# Patient Record
Sex: MALE | Race: WHITE | NOT HISPANIC OR LATINO | Employment: OTHER | URBAN - METROPOLITAN AREA
[De-identification: names, ages, dates, MRNs, and addresses within clinical notes are randomized per-mention and may not be internally consistent; named-entity substitution may affect disease eponyms.]

---

## 2017-01-03 ENCOUNTER — OFFICE VISIT (OUTPATIENT)
Dept: CARDIOLOGY | Facility: CLINIC | Age: 67
End: 2017-01-03
Payer: MEDICARE

## 2017-01-03 VITALS
HEIGHT: 71 IN | SYSTOLIC BLOOD PRESSURE: 163 MMHG | HEART RATE: 55 BPM | BODY MASS INDEX: 25.96 KG/M2 | DIASTOLIC BLOOD PRESSURE: 87 MMHG | WEIGHT: 185.44 LBS | OXYGEN SATURATION: 97 % | RESPIRATION RATE: 15 BRPM

## 2017-01-03 DIAGNOSIS — I71.40 ABDOMINAL AORTIC ANEURYSM (AAA) WITHOUT RUPTURE: ICD-10-CM

## 2017-01-03 DIAGNOSIS — I73.9 PAD (PERIPHERAL ARTERY DISEASE): ICD-10-CM

## 2017-01-03 DIAGNOSIS — I10 HYPERTENSION, ESSENTIAL: ICD-10-CM

## 2017-01-03 DIAGNOSIS — F17.200 CURRENT EVERY DAY SMOKER: Chronic | ICD-10-CM

## 2017-01-03 DIAGNOSIS — I25.10 CORONARY ARTERY DISEASE INVOLVING NATIVE CORONARY ARTERY OF NATIVE HEART WITHOUT ANGINA PECTORIS: Primary | ICD-10-CM

## 2017-01-03 PROCEDURE — 99214 OFFICE O/P EST MOD 30 MIN: CPT | Mod: S$GLB,,, | Performed by: INTERNAL MEDICINE

## 2017-01-03 PROCEDURE — 3077F SYST BP >= 140 MM HG: CPT | Mod: S$GLB,,, | Performed by: INTERNAL MEDICINE

## 2017-01-03 PROCEDURE — 3079F DIAST BP 80-89 MM HG: CPT | Mod: S$GLB,,, | Performed by: INTERNAL MEDICINE

## 2017-01-03 PROCEDURE — 1159F MED LIST DOCD IN RCRD: CPT | Mod: S$GLB,,, | Performed by: INTERNAL MEDICINE

## 2017-01-03 PROCEDURE — 99999 PR PBB SHADOW E&M-EST. PATIENT-LVL III: CPT | Mod: PBBFAC,,, | Performed by: INTERNAL MEDICINE

## 2017-01-03 RX ORDER — ASPIRIN 81 MG/1
81 TABLET ORAL DAILY
Qty: 90 TABLET | Refills: 3 | COMMUNITY
Start: 2017-01-03

## 2017-01-03 NOTE — MR AVS SNAPSHOT
"    [facility]  [address] [place] [address]  Phone: [phone]                  Antolin Mcmillan   1/3/2017 9:20 AM   Office Visit    Description:  Male : 1950   Provider:  Gustavo Michael MD   Department:  [facility]           Reason for Visit     Hospital Follow Up     Shortness of Breath                To Do List           Future Appointments        Provider Department Dept Phone    1/3/2017 9:20 AM Gustavo Michael MD [facility] [place], [place] [phone]      Goals (5 Years of Data)     None      [facility]     [facility] Care [facility] -  Assistance  Registered nurses in the [facility] provide clinical advisement, health education, appointment booking, and other advisory services.  Call for this free service at [phone].             Medications                Verify that the below list of medications is an accurate representation of the medications you are currently taking.  If none reported, the list may be blank. If incorrect, please contact your healthcare provider. Carry this list with you in case of emergency.           Current Medications     amlodipine (NORVASC) 10 MG tablet Take 10 mg by mouth once daily.    lisinopril-hydrochlorothiazide (PRINZIDE,ZESTORETIC) 20-25 mg Tab Take 1 tablet by mouth once daily.    metoprolol succinate (TOPROL-XL) 50 MG 24 hr tablet Take 50 mg by mouth once daily.    chlorzoxazone (PARAFON FORTE) 500 mg Tab Take 500 mg by mouth 4 (four) times daily as needed.           Clinical Reference Information           Vital Signs - Last Recorded  Most recent update: 1/3/2017  8:44 AM by Rosalinda Dumont MA    BP Pulse Resp Ht Wt SpO2    (!) 163/87 (!) 55 15 5' 11" (1.803 m) 84.1 kg (185 lb 6.5 oz) 97%    BMI                25.86 kg/m2          Blood Pressure          Most Recent Value    BP  (!)  163/87      Allergies as of 1/3/2017     No Known Allergies      Immunizations Administered on Date " of Encounter - 1/3/2017     None      [facility] Sign-Up     Activating your [facility] account is as easy as 1-2-3!     1) Visit [email or web address], select Sign Up Now, enter this activation code and your date of birth, then select Next.  [email or web address]  Expires: 2/4/2017  2:36 PM      2) Create a username and password to use when you visit [facility] in the future and select a security question in case you lose your password and select Next.    3) Enter your e-mail address and click Sign Up!    Additional Information  If you have questions, please e-mail [email or web address] or call [phone] to talk to our [facility] staff. Remember, [facility] is NOT to be used for urgent needs. For medical emergencies, dial 911.         Smoking Cessation     If you would like to quit smoking:   You may be eligible for free services if you are a [place] resident and started smoking cigarettes before September 1, 1988.  Call the Smoking Cessation Trust (SCT) toll free at ([phone] 225-6991 or ([phone].   Call 4-024-QUIT-NOW if you do not meet the above criteria.

## 2017-01-03 NOTE — PROGRESS NOTES
CARDIOVASCULAR PROGRESS NOTE    REASON FOR CONSULT:   Antolin Mcmillan is a 66 y.o. male who presents for follow up of CAD, HTN, AAA repair.    PCP: Jimmie  HISTORY OF PRESENT ILLNESS:   The patient presents for follow-up of his recent hospitalization for elective endovascular AAA repair.  This was performed successfully by Willy Chahal and Gerda.  He otherwise denies intercurrent angina or dyspnea.  He's had no palpitations, lightheadedness, dizziness, or syncope.  There's been no PND, orthopnea, or lower extremity edema.  He denies melena, hematuria, or claudicant symptoms.  He does describe some shoulder pain radiating to his left hand, as well as a burning sensation in both of his feet.  The pedal symptoms predated his AAA repair.  He does not describe any exertional symptoms such as calf pain.    The patient tells me he takes a baby aspirin daily.  Unfortunately, he cannot confirm the other parts of his medical regimen, including upon calling his wife at home.  I described the importance to him of knowing exactly what medication and dosage he is taking, and that he should keep a updated list with him at all times.    CARDIOVASCULAR HISTORY:   AAA s/p endo repair 12/2016 (Fela/Gerda)  CAD hx PCI 2009    PAST MEDICAL HISTORY:     Past Medical History   Diagnosis Date    AAA (abdominal aortic aneurysm) 12/2016     s/p endo repair (Fela)    Acid reflux     Anxiety     Coronary artery disease     Hypertension        PAST SURGICAL HISTORY:     Past Surgical History   Procedure Laterality Date    Appendectomy      Coronary angioplasty  2009       ALLERGIES AND MEDICATION:   Review of patient's allergies indicates:  No Known Allergies  Previous Medications    AMLODIPINE (NORVASC) 10 MG TABLET    Take 10 mg by mouth once daily.    CHLORZOXAZONE (PARAFON FORTE) 500 MG TAB    Take 500 mg by mouth 4 (four) times daily as needed.    LISINOPRIL-HYDROCHLOROTHIAZIDE (PRINZIDE,ZESTORETIC) 20-25 MG  "TAB    Take 1 tablet by mouth once daily.    METOPROLOL SUCCINATE (TOPROL-XL) 50 MG 24 HR TABLET    Take 50 mg by mouth once daily.       SOCIAL HISTORY:     Social History     Social History    Marital status:      Spouse name: N/A    Number of children: N/A    Years of education: N/A     Occupational History    Not on file.     Social History Main Topics    Smoking status: Smoker, Current Status Unknown     Packs/day: 2.00     Years: 20.00    Smokeless tobacco: Not on file    Alcohol use No    Drug use: No    Sexual activity: Not on file     Other Topics Concern    Not on file     Social History Narrative       FAMILY HISTORY:   No family history on file.    REVIEW OF SYSTEMS:   Review of Systems   Constitutional: Negative for chills, diaphoresis and fever.   HENT: Negative for nosebleeds.    Eyes: Negative for blurred vision, double vision and photophobia.   Respiratory: Negative for hemoptysis, shortness of breath and wheezing.    Cardiovascular: Negative for chest pain, palpitations, orthopnea, claudication, leg swelling and PND.   Gastrointestinal: Negative for abdominal pain, blood in stool, heartburn, melena, nausea and vomiting.   Genitourinary: Negative for flank pain and hematuria.   Musculoskeletal: Positive for joint pain. Negative for falls, myalgias and neck pain.   Skin: Negative for rash.   Neurological: Positive for tingling. Negative for dizziness, seizures, loss of consciousness, weakness and headaches.   Endo/Heme/Allergies: Negative for polydipsia. Does not bruise/bleed easily.   Psychiatric/Behavioral: Negative for depression and memory loss. The patient is not nervous/anxious.        PHYSICAL EXAM:     Vitals:    01/03/17 0837   BP: (!) 163/87   Pulse: (!) 55   Resp: 15    Body mass index is 25.86 kg/(m^2).  Weight: 84.1 kg (185 lb 6.5 oz)   Height: 5' 11" (180.3 cm)     Physical Exam   Constitutional: He is oriented to person, place, and time. He appears well-developed and " well-nourished. He is cooperative.  Non-toxic appearance. No distress.   HENT:   Head: Normocephalic and atraumatic.   Eyes: Conjunctivae and EOM are normal. Pupils are equal, round, and reactive to light. No scleral icterus.   Neck: Trachea normal and normal range of motion. Neck supple. Normal carotid pulses and no JVD present. Carotid bruit is not present. No rigidity. No edema present. No thyromegaly present.   Cardiovascular: Normal rate, regular rhythm, S1 normal and S2 normal.  PMI is not displaced.  Exam reveals no gallop and no friction rub.    No murmur heard.  Pulses:       Carotid pulses are 2+ on the right side, and 2+ on the left side.       Dorsalis pedis pulses are 1+ on the left side.        Posterior tibial pulses are 2+ on the right side, and 1+ on the left side.   Pulmonary/Chest: Effort normal and breath sounds normal. No respiratory distress. He has no wheezes. He has no rales. He exhibits no tenderness.   Abdominal: Soft. Bowel sounds are normal. He exhibits no distension and no mass. There is no hepatosplenomegaly. There is no tenderness.   Musculoskeletal: He exhibits no edema or tenderness.   Feet:   Right Foot:   Skin Integrity: Negative for ulcer.   Left Foot:   Skin Integrity: Negative for ulcer.   Neurological: He is alert and oriented to person, place, and time. No cranial nerve deficit.   Skin: Skin is warm and dry. No rash noted. No erythema.   Psychiatric: He has a normal mood and affect. His speech is normal and behavior is normal.   Vitals reviewed.      DATA:   EKG: (personally reviewed tracing)  12/8/16 SB 44, o/w normal    Laboratory:  CBC:    Recent Labs  Lab 03/11/14  0605 12/08/16  1319 12/13/16  0350   WHITE BLOOD CELL COUNT 8.33 10.28 8.16   HEMOGLOBIN 15.6 15.9 12.8 L   HEMATOCRIT 45.3 46.8 38.5 L   PLATELETS 256 264 149 L       CHEMISTRIES:    Recent Labs  Lab 03/10/14  1915  12/08/16  1319 12/13/16  0349 12/14/16  0407   GLUCOSE 82  < > 80 100 104   SODIUM 139  < > 140  140 137   POTASSIUM 3.4 L  < > 4.5 3.4 L 3.8   BUN BLD 14  < > 15 12 11   CREATININE 1.1  < > 1.1 1.1 1.0   EGFR IF  >60  < > >60 >60 >60   EGFR IF NON- >60  < > >60 >60 >60   CALCIUM 10.1  < > 9.7 8.0 L 8.9   MAGNESIUM 1.7  --   --   --  2.0   < > = values in this interval not displayed.    CARDIAC BIOMARKERS:    Recent Labs  Lab 03/10/14  1915 03/11/14  0605 03/11/14  1152   TROPONIN I 0.008 <0.006 <0.006       COAGS:    Recent Labs  Lab 03/10/14  1915   INR 1.0       LIPIDS/LFTS:    Recent Labs  Lab 03/10/14  1915 12/13/16  0349   CHOLESTEROL  --  171   TRIGLYCERIDES  --  219 H   HDL  --  23 L   LDL CHOLESTEROL  --  104.2   NON-HDL CHOLESTEROL  --  148   AST 19 22   ALT 19 28       Cardiovascular Testing:  Stress Test: L MPI 3/11/14  Nuclear Quantitative Functional Analysis:   LVEF: >= 70 %  Impression: NORMAL MYOCARDIAL PERFUSION  1. The perfusion scan is free of evidence for myocardial ischemia or injury.   2. There is a moderate intensity fixed defect in the inferior wall of the left ventricle, secondary to diaphragm attenuation.   3. Resting wall motion is physiologic.   4. Resting LV function is normal.   5. The ventricular volumes are normal at rest and stress.   6. The extracardiac distribution of radioactivity is normal.     ASSESSMENT:   # AAA s/p endovasc repair 12/2016 (Fela/Gerda)  # HTN, uncontrolled  # CAD, hx of distant PCI, last MPI 3/2014  # elev TG/LDL  # atyp claudicant sxs    PLAN:   Consider statin rx, will d/w pt when he brings pill bottles to next OV  Ex MPI  Smoking class  LE art US/LASHAUN  Pt to bring pill bottles to next OV  Follow up planned with Dr. Chahal re: AAA repair  RTC 1 month      Gustavo Michael MD, FACC

## 2017-01-09 ENCOUNTER — HOSPITAL ENCOUNTER (OUTPATIENT)
Dept: RADIOLOGY | Facility: HOSPITAL | Age: 67
Discharge: HOME | End: 2017-01-09
Attending: INTERNAL MEDICINE
Payer: MEDICARE

## 2017-01-09 DIAGNOSIS — I73.9 PAD (PERIPHERAL ARTERY DISEASE): ICD-10-CM

## 2017-01-09 PROCEDURE — 93925 LOWER EXTREMITY STUDY: CPT | Mod: 26,,, | Performed by: RADIOLOGY

## 2017-01-09 PROCEDURE — 93925 LOWER EXTREMITY STUDY: CPT | Mod: TC

## 2017-01-09 PROCEDURE — 93922 UPR/L XTREMITY ART 2 LEVELS: CPT | Mod: 26,,, | Performed by: RADIOLOGY

## 2017-01-10 ENCOUNTER — HOSPITAL ENCOUNTER (OUTPATIENT)
Dept: CARDIOLOGY | Facility: HOSPITAL | Age: 67
Discharge: HOME | End: 2017-01-10
Attending: INTERNAL MEDICINE
Payer: MEDICARE

## 2017-01-10 ENCOUNTER — HOSPITAL ENCOUNTER (OUTPATIENT)
Dept: RADIOLOGY | Facility: HOSPITAL | Age: 67
Discharge: HOME | End: 2017-01-10
Attending: INTERNAL MEDICINE
Payer: MEDICARE

## 2017-01-10 DIAGNOSIS — I25.10 CORONARY ARTERY DISEASE INVOLVING NATIVE CORONARY ARTERY OF NATIVE HEART WITHOUT ANGINA PECTORIS: ICD-10-CM

## 2017-01-10 LAB — DIASTOLIC DYSFUNCTION: NO

## 2017-01-10 PROCEDURE — 78452 HT MUSCLE IMAGE SPECT MULT: CPT | Mod: 26,,, | Performed by: INTERNAL MEDICINE

## 2017-01-10 PROCEDURE — 93016 CV STRESS TEST SUPVJ ONLY: CPT | Mod: ,,, | Performed by: INTERNAL MEDICINE

## 2017-01-10 PROCEDURE — 93017 CV STRESS TEST TRACING ONLY: CPT

## 2017-01-10 PROCEDURE — 93018 CV STRESS TEST I&R ONLY: CPT | Mod: ,,, | Performed by: INTERNAL MEDICINE

## 2017-01-10 PROCEDURE — 78452 HT MUSCLE IMAGE SPECT MULT: CPT | Mod: TC

## 2017-01-12 ENCOUNTER — CLINICAL SUPPORT (OUTPATIENT)
Dept: SMOKING CESSATION | Facility: CLINIC | Age: 67
End: 2017-01-12
Payer: COMMERCIAL

## 2017-01-12 DIAGNOSIS — F17.200 NICOTINE DEPENDENCE: Primary | ICD-10-CM

## 2017-01-12 PROCEDURE — 99999 PR PBB SHADOW E&M-EST. PATIENT-LVL I: CPT | Mod: PBBFAC,,,

## 2017-01-12 PROCEDURE — 99404 PREV MED CNSL INDIV APPRX 60: CPT | Mod: S$GLB,,,

## 2017-01-12 RX ORDER — IBUPROFEN 200 MG
1 TABLET ORAL DAILY
Qty: 14 PATCH | Refills: 0 | Status: SHIPPED | OUTPATIENT
Start: 2017-01-12 | End: 2017-01-27 | Stop reason: SDUPTHER

## 2017-01-12 NOTE — Clinical Note
Pt seen at intake today. He currently smokes 20-25 cigs/day. Discussed tobacco cessation medications of chantix starter pack and 21 mg nicotine patch QD . Pt started on rate reduction and wait time of 15 min prior to smoking. Will see pt back in office group in 1 wk. Pt's exhaled carbon monoxide level was 15 ppm as per [facility]..  Please see Tobacco Cessation Intake Form for patient assessment and recommendation

## 2017-01-19 ENCOUNTER — CLINICAL SUPPORT (OUTPATIENT)
Dept: SMOKING CESSATION | Facility: CLINIC | Age: 67
End: 2017-01-19
Payer: COMMERCIAL

## 2017-01-19 DIAGNOSIS — F17.200 NICOTINE DEPENDENCE: Primary | ICD-10-CM

## 2017-01-19 PROCEDURE — 99402 PREV MED CNSL INDIV APPRX 30: CPT | Mod: S$GLB,,,

## 2017-01-19 RX ORDER — MICONAZOLE NITRATE 2 %
2 CREAM (GRAM) TOPICAL
Qty: 160 EACH | Refills: 0 | Status: SHIPPED | OUTPATIENT
Start: 2017-01-19 | End: 2017-11-17

## 2017-01-22 NOTE — PROGRESS NOTES
Pt seen in office today.       He continues to smoke  25 cigs/day.    Pt remains on tobacco cessation medications of    21 mg nicotine patch QD    Patient said he still craved , so 2 mg nicotine lozenge   PRN (1-2 per hour in place of cigarettes) was added.                  No adverse effects  or mental changes noted at this time.   Reviewed coping strategies/habitual behavior/relapse prevention with patient.  Stressed to patient that he would really benefit from group and he said he will definitely make it next time.   Talked / reassured pt.    Exhaled carbon monoxide level was 13 per [facility].   Will see pt back in group  in 1 week .

## 2017-01-25 ENCOUNTER — OFFICE VISIT (OUTPATIENT)
Dept: CARDIOLOGY | Facility: CLINIC | Age: 67
End: 2017-01-25
Payer: MEDICARE

## 2017-01-25 ENCOUNTER — CLINICAL SUPPORT (OUTPATIENT)
Dept: SMOKING CESSATION | Facility: CLINIC | Age: 67
End: 2017-01-25
Payer: COMMERCIAL

## 2017-01-25 VITALS
HEIGHT: 71 IN | BODY MASS INDEX: 26.08 KG/M2 | RESPIRATION RATE: 15 BRPM | SYSTOLIC BLOOD PRESSURE: 165 MMHG | WEIGHT: 186.31 LBS | OXYGEN SATURATION: 95 % | DIASTOLIC BLOOD PRESSURE: 99 MMHG | HEART RATE: 68 BPM

## 2017-01-25 DIAGNOSIS — F17.200 NICOTINE DEPENDENCE: Primary | ICD-10-CM

## 2017-01-25 DIAGNOSIS — I25.10 CORONARY ARTERY DISEASE INVOLVING NATIVE CORONARY ARTERY OF NATIVE HEART WITHOUT ANGINA PECTORIS: Primary | ICD-10-CM

## 2017-01-25 DIAGNOSIS — I10 HYPERTENSION, ESSENTIAL: ICD-10-CM

## 2017-01-25 DIAGNOSIS — F17.200 CURRENT EVERY DAY SMOKER: Chronic | ICD-10-CM

## 2017-01-25 DIAGNOSIS — I71.40 ABDOMINAL AORTIC ANEURYSM (AAA) WITHOUT RUPTURE: ICD-10-CM

## 2017-01-25 PROCEDURE — 90853 GROUP PSYCHOTHERAPY: CPT | Mod: S$GLB,,,

## 2017-01-25 PROCEDURE — 99214 OFFICE O/P EST MOD 30 MIN: CPT | Mod: S$GLB,,, | Performed by: INTERNAL MEDICINE

## 2017-01-25 PROCEDURE — 3077F SYST BP >= 140 MM HG: CPT | Mod: S$GLB,,, | Performed by: INTERNAL MEDICINE

## 2017-01-25 PROCEDURE — 99999 PR PBB SHADOW E&M-EST. PATIENT-LVL I: CPT | Mod: PBBFAC,,,

## 2017-01-25 PROCEDURE — 3080F DIAST BP >= 90 MM HG: CPT | Mod: S$GLB,,, | Performed by: INTERNAL MEDICINE

## 2017-01-25 PROCEDURE — 1159F MED LIST DOCD IN RCRD: CPT | Mod: S$GLB,,, | Performed by: INTERNAL MEDICINE

## 2017-01-25 PROCEDURE — 99999 PR PBB SHADOW E&M-EST. PATIENT-LVL III: CPT | Mod: PBBFAC,,, | Performed by: INTERNAL MEDICINE

## 2017-01-25 RX ORDER — AMITRIPTYLINE HYDROCHLORIDE 50 MG/1
50 TABLET, FILM COATED ORAL NIGHTLY
COMMUNITY
End: 2017-11-17

## 2017-01-25 RX ORDER — ATORVASTATIN CALCIUM 40 MG/1
40 TABLET, FILM COATED ORAL NIGHTLY
Qty: 90 TABLET | Refills: 3 | Status: SHIPPED | OUTPATIENT
Start: 2017-01-25 | End: 2018-04-26 | Stop reason: SDUPTHER

## 2017-01-25 RX ORDER — AMLODIPINE BESYLATE 10 MG/1
10 TABLET ORAL DAILY
Qty: 90 TABLET | Refills: 3 | Status: SHIPPED | OUTPATIENT
Start: 2017-01-25 | End: 2018-08-09 | Stop reason: SDUPTHER

## 2017-01-25 NOTE — Clinical Note
Pt continues to smoke 10 cigs/day. Pt remains on tobacco cessation medication of 21 mg nicotine patch QD and 2 mg nicotine lozenge PRN (1-2 per hour in place of cigarettes) for break through tobacco urges/cravings. Discussed symptoms rated as 3 or 4 on TCRS scale, none reported at this time.  Pt asked to reduce smoking rate by 1 cigs/day.  Pt's exhaled carbon monoxide level was 12 ppm per smoker[facility]zer. Will see pt back in group 1 week.

## 2017-01-25 NOTE — MR AVS SNAPSHOT
[facility]  [address] [place] [address]  Phone: [phone]                  Antolin Mcmillan   2017 3:40 PM   Office Visit    Description:  Male : 1950   Provider:  Gustavo Michael MD   Department:  [facility]           Reason for Visit     Coronary Artery Disease     Results                To Do List           Future Appointments        Provider Department Dept Phone    3/2/2017 3:40 PM Gustavo Michael MD [facility] [place] [phone]      Goals (5 Years of Data)     None       These Medications        Disp Refills Start End    amlodipine (NORVASC) 10 MG tablet 90 tablet 3 2017     Take 1 tablet (10 mg total) by mouth once daily. - Oral    Pharmacy: [facility] mail order [address] [place], [place] [address] [facility] mail services Ph #: [phone]       atorvastatin (LIPITOR) 40 MG tablet 90 tablet 3 2017     Take 1 tablet (40 mg total) by mouth every evening. - Oral    Pharmacy: [email or web address] mail [id number] - [place], [place] - [address] [facility] mail services Ph #: [phone]         [facility]     [facility] On Call Nurse Care Line -  Assistance  Registered nurses in the [facility] provide clinical advisement, health education, appointment booking, and other advisory services.  Call for this free service at [phone].             Medications           START taking these NEW medications        Refills    atorvastatin (LIPITOR) 40 MG tablet 3    Sig: Take 1 tablet (40 mg total) by mouth every evening.    Class: Normal    Route: Oral      CHANGE how you are taking these medications     Start Taking Instead of    amlodipine (NORVASC) 10 MG tablet amlodipine (NORVASC) 10 MG tablet    Dosage:  Take 1 tablet (10 mg total) by mouth once daily. Dosage:  Take 10 mg by mouth once daily.    Reason for Change:  Patient no longer taking       STOP taking these medications      "lisinopril-hydrochlorothiazide (PRINZIDE,ZESTORETIC) 20-25 mg Tab Take 1 tablet by mouth once daily.           Verify that the below list of medications is an accurate representation of the medications you are currently taking.  If none reported, the list may be blank. If incorrect, please contact your healthcare provider. Carry this list with you in case of emergency.           Current Medications     amitriptyline (ELAVIL) 50 MG tablet Take 50 mg by mouth every evening.    aspirin (ECOTRIN) 81 MG EC tablet Take 1 tablet (81 mg total) by mouth once daily.    chlorzoxazone (PARAFON FORTE) 500 mg Tab Take 500 mg by mouth 4 (four) times daily as needed.    metoprolol succinate (TOPROL-XL) 50 MG 24 hr tablet Take 50 mg by mouth once daily.    nicotine (NICODERM CQ) 21 mg/24 hr Place 1 patch onto the skin once daily.    nicotine, polacrilex, (NICORETTE) 2 mg Gum Take 1 each (2 mg total) by mouth as needed.    amlodipine (NORVASC) 10 MG tablet Take 1 tablet (10 mg total) by mouth once daily.    atorvastatin (LIPITOR) 40 MG tablet Take 1 tablet (40 mg total) by mouth every evening.           Clinical Reference Information           Vital Signs - Last Recorded  Most recent update: 1/25/2017  3:38 PM by Rosalinda Dumont MA    BP Pulse Resp Ht Wt SpO2    (!) 165/99 68 15 5' 11" (1.803 m) 84.5 kg (186 lb 4.6 oz) 95%    BMI                25.98 kg/m2          Blood Pressure          Most Recent Value    BP  (!)  165/99      Allergies as of 1/25/2017     No Known Allergies      Immunizations Administered on Date of Encounter - 1/25/2017     None      [facility] Sign-Up     Activating your [facility] account is as easy as 1-2-3!     1) Visit [email or web address], select Sign Up Now, enter this activation code and your date of birth, then select Next.  [email or web address]  Expires: 2/4/2017  2:36 PM      2) Create a username and password to use when you visit [facility] in the future and select a security question in case you lose your " password and select Next.    3) Enter your e-mail address and click Sign Up!    Additional Information  If you have questions, please e-mail [email or web address] or call [phone] to talk to our [facility] staff. Remember, [facility] is NOT to be used for urgent needs. For medical emergencies, dial 911.         Smoking Cessation     If you would like to quit smoking:   You may be eligible for free services if you are a [place] resident and started smoking cigarettes before September 1, 1988.  Call the Smoking Cessation Trust (SCT) toll free at (896) 326-4287 or (655) [phone].   Call 1-318-QUIT-NOW if you do not meet the above criteria.

## 2017-01-25 NOTE — PROGRESS NOTES
CARDIOVASCULAR PROGRESS NOTE    REASON FOR CONSULT:   Antolin Mcmillan is a 66 y.o. male who presents for follow up of CAD, HTN, AAA repair.    PCP: Jimmie  HISTORY OF PRESENT ILLNESS:   The patient presents for follow-up.  He brings with him his pill bottles and it appears that he is only taking a baby aspirin along with metoprolol and amitriptyline.  His blood pressure remains elevated.  He otherwise denies intercurrent angina or dyspnea.  He's had no palpitations, lightheadedness, dizziness, or syncope.  There's been no PND, orthopnea, or lower extremity edema.  He denies melena, hematuria, or claudicant symptoms.    I reviewed the results of his lower extremity arterial duplex and nuclear stress test for both essentially normal.    At this point, given his evidence of vascular disease, and like to get him on that her blood pressure medication, as well as drive his lipids down.    CARDIOVASCULAR HISTORY:   AAA s/p endo repair 12/2016 ([facility])  CAD hx PCI 2009    PAST MEDICAL HISTORY:     Past Medical History   Diagnosis Date    AAA (abdominal aortic aneurysm) 12/2016     s/p endo repair ([facility])    Acid reflux     Anxiety     Coronary artery disease     Hypertension        PAST SURGICAL HISTORY:     Past Surgical History   Procedure Laterality Date    Appendectomy      Coronary angioplasty  2009       ALLERGIES AND MEDICATION:   Review of patient's allergies indicates:  No Known Allergies  Previous Medications    AMITRIPTYLINE (ELAVIL) 50 MG TABLET    Take 50 mg by mouth every evening.    ASPIRIN (ECOTRIN) 81 MG EC TABLET    Take 1 tablet (81 mg total) by mouth once daily.    CHLORZOXAZONE (PARAFON FORTE) 500 MG TAB    Take 500 mg by mouth 4 (four) times daily as needed.    METOPROLOL SUCCINATE (TOPROL-XL) 50 MG 24 HR TABLET    Take 50 mg by mouth once daily.    NICOTINE (NICODERM CQ) 21 MG/24 HR    Place 1 patch onto the skin once daily.    NICOTINE, POLACRILEX, (NICORETTE) 2 MG GUM     "Take 1 each (2 mg total) by mouth as needed.       SOCIAL HISTORY:     Social History     Social History    Marital status:      Spouse name: N/A    Number of children: N/A    Years of education: N/A     Occupational History    Not on file.     Social History Main Topics    Smoking status: Smoker, Current Status Unknown     Packs/day: 2.00     Years: 20.00    Smokeless tobacco: Not on file    Alcohol use No    Drug use: No    Sexual activity: Not on file     Other Topics Concern    Not on file     Social History Narrative       FAMILY HISTORY:   No family history on file.    REVIEW OF SYSTEMS:   Review of Systems   Constitutional: Negative for chills, diaphoresis and fever.   HENT: Negative for nosebleeds.    Eyes: Negative for blurred vision, double vision and photophobia.   Respiratory: Negative for hemoptysis, shortness of breath and wheezing.    Cardiovascular: Negative for chest pain, palpitations, orthopnea, claudication, leg swelling and PND.   Gastrointestinal: Negative for abdominal pain, blood in stool, heartburn, melena, nausea and vomiting.   Genitourinary: Negative for flank pain and hematuria.   Musculoskeletal: Positive for joint pain. Negative for falls, myalgias and neck pain.   Skin: Negative for rash.   Neurological: Positive for tingling. Negative for dizziness, seizures, loss of consciousness, weakness and headaches.   Endo/Heme/Allergies: Negative for polydipsia. Does not bruise/bleed easily.   Psychiatric/Behavioral: Negative for depression and memory loss. The patient is not nervous/anxious.        PHYSICAL EXAM:     Vitals:    01/25/17 1535   BP: (!) 165/99   Pulse: 68   Resp: 15    Body mass index is 25.98 kg/(m^2).  Weight: 84.5 kg (186 lb 4.6 oz)   Height: 5' 11" (180.3 cm)     Physical Exam   Constitutional: He is oriented to person, place, and time. He appears well-developed and well-nourished. He is cooperative.  Non-toxic appearance. No distress.   HENT:   Head: " Normocephalic and atraumatic.   Eyes: Conjunctivae and EOM are normal. Pupils are equal, round, and reactive to light. No scleral icterus.   Neck: Trachea normal and normal range of motion. Neck supple. Normal carotid pulses and no JVD present. Carotid bruit is not present. No rigidity. No edema present. No thyromegaly present.   Cardiovascular: Normal rate, regular rhythm, S1 normal and S2 normal.  PMI is not displaced.  Exam reveals no gallop and no friction rub.    No murmur heard.  Pulses:       Carotid pulses are 2+ on the right side, and 2+ on the left side.       Dorsalis pedis pulses are 1+ on the left side.        Posterior tibial pulses are 2+ on the right side, and 1+ on the left side.   Pulmonary/Chest: Effort normal and breath sounds normal. No respiratory distress. He has no wheezes. He has no rales. He exhibits no tenderness.   Abdominal: Soft. Bowel sounds are normal. He exhibits no distension and no mass. There is no hepatosplenomegaly. There is no tenderness.   Musculoskeletal: He exhibits no edema or tenderness.   Feet:   Right Foot:   Skin Integrity: Negative for ulcer.   Left Foot:   Skin Integrity: Negative for ulcer.   Neurological: He is alert and oriented to person, place, and time. No cranial nerve deficit.   Skin: Skin is warm and dry. No rash noted. No erythema.   Psychiatric: He has a normal mood and affect. His speech is normal and behavior is normal.   Vitals reviewed.      DATA:   EKG: (personally reviewed tracing)  12/8/16 SB 44, o/w normal    Laboratory:  CBC:    Recent Labs  Lab 03/11/14  0605 12/08/16  1319 12/13/16  0350   WHITE BLOOD CELL COUNT 8.33 10.28 8.16   HEMOGLOBIN 15.6 15.9 12.8 L   HEMATOCRIT 45.3 46.8 38.5 L   PLATELETS 256 264 149 L       CHEMISTRIES:    Recent Labs  Lab 03/10/14  1915  12/08/16  1319 12/13/16  0349 12/14/16  0407   GLUCOSE 82  < > 80 100 104   SODIUM 139  < > 140 140 137   POTASSIUM 3.4 L  < > 4.5 3.4 L 3.8   BUN BLD 14  < > 15 12 11   CREATININE  1.1  < > 1.1 1.1 1.0   EGFR IF  >60  < > >60 >60 >60   EGFR IF NON- >60  < > >60 >60 >60   CALCIUM 10.1  < > 9.7 8.0 L 8.9   MAGNESIUM 1.7  --   --   --  2.0   < > = values in this interval not displayed.    CARDIAC BIOMARKERS:    Recent Labs  Lab 03/10/14  1915 03/11/14  0605 03/11/14  1152   TROPONIN I 0.008 <0.006 <0.006       COAGS:    Recent Labs  Lab 03/10/14  1915   INR 1.0       LIPIDS/LFTS:    Recent Labs  Lab 03/10/14  1915 12/13/16  0349   CHOLESTEROL  --  171   TRIGLYCERIDES  --  219 H   HDL  --  23 L   LDL CHOLESTEROL  --  104.2   NON-HDL CHOLESTEROL  --  148   AST 19 22   ALT 19 28       Cardiovascular Testing:  Ex MPI 1/10/17  The patient exercised for 5.5 minutes on a High Ramp protocol, corresponding to a functional capacity of 7 estimated METS, achieving a peak heart rate of 151 bpm, which is 97% of the age predicted maximum heart rate. -CP/EKG.  Nuclear Quantitative Functional Analysis:   LVEF: 49 %  Impression: NORMAL MYOCARDIAL PERFUSION  1. The perfusion scan is free of evidence for myocardial ischemia or injury.   2. There is a mild intensity fixed defect in the inferior wall of the left ventricle, secondary to diaphragm attenuation.   3. Resting wall motion is physiologic.   4. There is resting LV dysfunction with a reduced ejection fraction of 49 %.   5. The ventricular volumes are normal at rest and stress.   6. The extracardiac distribution of radioactivity is normal.   7. When compared to the previous study from 03/11/2014, no change    LE art US 1/9/17  1.  With regards to the right lower extremity there is no evidence of hemodynamically significant peripheral vascular disease. LASHAUN 1.07  2.  With regards to the left lower extremity there is no evidence of hemodynamically significant peripheral vascular disease. LASHAUN 1.08    Stress Test: L MPI 3/11/14  Nuclear Quantitative Functional Analysis:   LVEF: >= 70 %  Impression: NORMAL MYOCARDIAL PERFUSION  1. The  perfusion scan is free of evidence for myocardial ischemia or injury.   2. There is a moderate intensity fixed defect in the inferior wall of the left ventricle, secondary to diaphragm attenuation.   3. Resting wall motion is physiologic.   4. Resting LV function is normal.   5. The ventricular volumes are normal at rest and stress.   6. The extracardiac distribution of radioactivity is normal.     ASSESSMENT:   # AAA s/p endovasc repair 12/2016 ([facility])  # HTN, uncontrolled  # CAD, hx of distant PCI, MPI 1/2017 normal  # elev TG/LDL    PLAN:   Restart amlodipine 10mg qd  Start atrova 40mg qhs  Smoking class  Follow up planned with Dr. Chahal re: AAA repair  RTC 1 month for review of BP  Check lipids/LFT 3 months (late Apr 2017)      Gustavo Michael MD, FACC

## 2017-01-27 RX ORDER — IBUPROFEN 200 MG
1 TABLET ORAL DAILY
Qty: 28 PATCH | Refills: 0 | Status: SHIPPED | OUTPATIENT
Start: 2017-01-27 | End: 2017-02-09 | Stop reason: ALTCHOICE

## 2017-01-27 NOTE — PROGRESS NOTES
Smoking Cessation Group Session #1    Site: [facility]  Date:  1/24/17  Clinical Status of Patient: Outpatient   Length of Service and Code: 60 minutes - 66967   Number in Attendance: 4  Group Activities/Focus of Group:  Sharing last weeks challenges, triggers, and coping activities to remain quit and/ or keep making progress toward cessation, completion of TCRS (Tobacco Cessation Rating Scale) learned addiction model, personal reasons for quitting, medications, goals, quit date.      Target symptoms:  withdrawal and medication side effects             The following were rated moderate (3) to severe (4) on TCRS:       Moderate 3: None     Severe 4:   None  Patient's Response to Intervention: Active participation, self-disclosure, supportive of group and peers. Pt continues to smoke 10 cigs/day. Pt remains on tobacco cessation medication of 21 mg nicotine patch QD and 2 mg nicotine lozenge PRN (1-2 per hour in place of cigarettes) for break through tobacco urges/cravings. Discussed symptoms rated as 3 or 4 on TCRS scale, none reported at this time.  Pt asked to reduce smoking rate by 1 cigs/day.  Pt's exhaled carbon monoxide level was 12 ppm per smokerlyzer. Will see pt back in group 1 week.   Progress Toward Goals and Other Mental Status Changes: The patient denies any abnormal behavioral or mental changes at this time.       Diagnosis: Z72.0  Plan: The patient will continue with group therapy sessions and tobacco cessation medication monitoring by CTTS.   Return to Clinic: 1 week

## 2017-02-01 ENCOUNTER — CLINICAL SUPPORT (OUTPATIENT)
Dept: SMOKING CESSATION | Facility: CLINIC | Age: 67
End: 2017-02-01
Payer: COMMERCIAL

## 2017-02-01 DIAGNOSIS — F17.200 NICOTINE DEPENDENCE: Primary | ICD-10-CM

## 2017-02-01 PROCEDURE — 99999 PR PBB SHADOW E&M-EST. PATIENT-LVL I: CPT | Mod: PBBFAC,,,

## 2017-02-01 PROCEDURE — 90853 GROUP PSYCHOTHERAPY: CPT | Mod: S$GLB,,,

## 2017-02-02 NOTE — PROGRESS NOTES
Smoking Cessation Group Session #2    [facility]: [facility]  Date:  2/1/17  Clinical Status of Patient: Outpatient   Length of Service and Code: 90 minutes - 33854   Number in Attendance: 4  Group Activities/Focus of Group:  Sharing last weeks challenges, triggers, and coping activities to remain quit and/ or keep making progress toward cessation, completion of TCRS (Tobacco Cessation Rating Scale) reviewed strategies, cues, and triggers. Introduced the negative impact of tobacco on health, the health advantages of discontinuing the use of tobacco, time line improved health changes after a quit, withdrawal issues to expect from nicotine and habit, and ways to achieve the goal of a quit.  Target symptoms:  withdrawal and medication side effects             The following were rated moderate (3) to severe (4) on TCRS:       Moderate 3: none     Severe 4:   none  Patient's Response to Intervention: Active participation, self-disclosure, supportive of group and peers. Pt continues to smokecigs/day. Pt remains on tobacco cessation medication of 21mg nicotine patch QD and 2 mg nicotine gum PRN (1-2 per hour in place of cigarettes) for break through tobacco urges/cravings. Discussed symptoms rated as 3 or 4 on TCRS scale, none reported at this time. . Pt asked to reduce smoking rate by 1 cigs/day.. Pt will continue with rate reduction. Pt's exhaled carbon monoxide level was 7 ppm per smokerlyzer. Will see pt back in group 1 wk.   Progress Toward Goals and Other Mental Status Changes: The patient denies any abnormal behavioral or mental changes at this time.       Diagnosis: Z72.0  Plan: The patient will continue with group therapy sessions and tobacco cessation medication monitoring by CTTS.   Return to Clinic: 1 week

## 2017-02-09 ENCOUNTER — CLINICAL SUPPORT (OUTPATIENT)
Dept: SMOKING CESSATION | Facility: CLINIC | Age: 67
End: 2017-02-09
Payer: COMMERCIAL

## 2017-02-09 ENCOUNTER — TELEPHONE (OUTPATIENT)
Dept: CARDIOLOGY | Facility: CLINIC | Age: 67
End: 2017-02-09

## 2017-02-09 DIAGNOSIS — F17.200 NICOTINE DEPENDENCE: Primary | ICD-10-CM

## 2017-02-09 PROCEDURE — 99407 BEHAV CHNG SMOKING > 10 MIN: CPT | Mod: S$GLB,,,

## 2017-02-09 RX ORDER — VARENICLINE TARTRATE 0.5 (11)-1
KIT ORAL
Qty: 1 PACKAGE | Refills: 0 | Status: SHIPPED | OUTPATIENT
Start: 2017-02-09 | End: 2017-03-08 | Stop reason: SDUPTHER

## 2017-02-09 NOTE — PROGRESS NOTES
Phone follow up today, patient did not make it to group last night , he said he fell asleep and woke up late.  He is back up to smoking 20 cigs/day. Pt remains on tobacco cessation medications of 21 mg nicotine patch QD and 2mg  nicotine gum PRN (1-2 per hour in place of cigarettes). No adverse effects/mental changes noted at this time. Patient said he is frustrated and he said he really would like to use Chantix because he said he has quit using it once before. Pt understands he needs his cardiologist approval first and he said he will call to discuss this issue with him . Patient also now takes Elavil for depression. Reviewed coping strategies/habitual behavior/relapse prevention with patient.  Will see pt back in group next week.

## 2017-02-15 ENCOUNTER — CLINICAL SUPPORT (OUTPATIENT)
Dept: SMOKING CESSATION | Facility: CLINIC | Age: 67
End: 2017-02-15
Payer: COMMERCIAL

## 2017-02-15 DIAGNOSIS — F17.200 NICOTINE DEPENDENCE: Primary | ICD-10-CM

## 2017-02-15 PROCEDURE — 90853 GROUP PSYCHOTHERAPY: CPT | Mod: S$GLB,,,

## 2017-02-16 DIAGNOSIS — F17.200 NICOTINE DEPENDENCE: Primary | ICD-10-CM

## 2017-02-16 RX ORDER — IBUPROFEN 200 MG
1 TABLET ORAL DAILY
Qty: 28 PATCH | Refills: 0 | Status: SHIPPED | OUTPATIENT
Start: 2017-02-16 | End: 2017-11-17

## 2017-02-16 NOTE — PROGRESS NOTES
Smoking Cessation Group Session #4    Site: [facility]  Date:  2/15/2017  Clinical Status of Patient: Outpatient   Length of Service and Code: 90 minutes - 70086   Number in Attendance: 4  Group Activities/Focus of Group:  Sharing last weeks challenges, triggers, and coping activities to remain quit and/ or keep making progress toward cessation, completion of TCRS (Tobacco Cessation Rating Scale) reviewed strategies, controlling environment, cues, triggers, new goals set. Introduced high risk situations with preparation interventions, caffeine similarities with withdrawal issues of habit and nicotine, Alcohol, Understanding urges, cravings, stress and relaxation. Open discussion with intervention discussion.  Target symptoms:  withdrawal and medication side effects             The following were rated moderate (3) to severe (4) on TCRS:       Moderate 3: crave, irritability     Severe 4:   none  Patient's Response to Intervention: Active participation, self-disclosure, supportive of group and peers. Pt continues to smoke 40 cigs/day. Pt is still waiting  on tobacco cessation medication of chantix starter pack.to come in the mail.   Discussed symptoms rated as 3 or 4 on TCRS scale, none reported at this time. All related to NRT or withdrawal symptoms. None bothersome to pt at this time, will monitor. No problems or adverse effects noted at this time. Pt will continue with rate reduction. Pt's exhaled carbon monoxide level was 30 ppm per smokerlyzer. Will see pt back in group 1 wk.   Progress Toward Goals and Other Mental Status Changes: The patient denies any abnormal behavioral or mental changes at this time.       Diagnosis: Z72.0  Plan: The patient will continue with group therapy sessions and tobacco cessation medication monitoring by CTTS.   Return to Clinic: 1 week

## 2017-02-23 ENCOUNTER — TELEPHONE (OUTPATIENT)
Dept: CARDIOLOGY | Facility: CLINIC | Age: 67
End: 2017-02-23

## 2017-03-01 ENCOUNTER — TELEPHONE (OUTPATIENT)
Dept: SMOKING CESSATION | Facility: CLINIC | Age: 67
End: 2017-03-01

## 2017-03-02 ENCOUNTER — CLINICAL SUPPORT (OUTPATIENT)
Dept: SMOKING CESSATION | Facility: CLINIC | Age: 67
End: 2017-03-02
Payer: COMMERCIAL

## 2017-03-02 ENCOUNTER — OFFICE VISIT (OUTPATIENT)
Dept: CARDIOLOGY | Facility: CLINIC | Age: 67
End: 2017-03-02
Payer: MEDICARE

## 2017-03-02 VITALS
DIASTOLIC BLOOD PRESSURE: 84 MMHG | HEART RATE: 74 BPM | OXYGEN SATURATION: 97 % | SYSTOLIC BLOOD PRESSURE: 145 MMHG | RESPIRATION RATE: 15 BRPM | WEIGHT: 186.94 LBS | BODY MASS INDEX: 26.17 KG/M2 | HEIGHT: 71 IN

## 2017-03-02 DIAGNOSIS — I25.10 CORONARY ARTERY DISEASE INVOLVING NATIVE CORONARY ARTERY OF NATIVE HEART WITHOUT ANGINA PECTORIS: ICD-10-CM

## 2017-03-02 DIAGNOSIS — E78.5 HYPERLIPIDEMIA, UNSPECIFIED HYPERLIPIDEMIA TYPE: ICD-10-CM

## 2017-03-02 DIAGNOSIS — F17.200 CURRENT EVERY DAY SMOKER: Chronic | ICD-10-CM

## 2017-03-02 DIAGNOSIS — I71.40 ABDOMINAL AORTIC ANEURYSM (AAA) WITHOUT RUPTURE: ICD-10-CM

## 2017-03-02 DIAGNOSIS — I10 HYPERTENSION, ESSENTIAL: Primary | ICD-10-CM

## 2017-03-02 DIAGNOSIS — F17.200 NICOTINE DEPENDENCE: Primary | ICD-10-CM

## 2017-03-02 PROCEDURE — 3079F DIAST BP 80-89 MM HG: CPT | Mod: S$GLB,,, | Performed by: INTERNAL MEDICINE

## 2017-03-02 PROCEDURE — 99214 OFFICE O/P EST MOD 30 MIN: CPT | Mod: S$GLB,,, | Performed by: INTERNAL MEDICINE

## 2017-03-02 PROCEDURE — 1160F RVW MEDS BY RX/DR IN RCRD: CPT | Mod: S$GLB,,, | Performed by: INTERNAL MEDICINE

## 2017-03-02 PROCEDURE — 99999 PR PBB SHADOW E&M-EST. PATIENT-LVL III: CPT | Mod: PBBFAC,,, | Performed by: INTERNAL MEDICINE

## 2017-03-02 PROCEDURE — 99407 BEHAV CHNG SMOKING > 10 MIN: CPT | Mod: S$GLB,,,

## 2017-03-02 PROCEDURE — 3077F SYST BP >= 140 MM HG: CPT | Mod: S$GLB,,, | Performed by: INTERNAL MEDICINE

## 2017-03-02 RX ORDER — METOPROLOL SUCCINATE 50 MG/1
100 TABLET, EXTENDED RELEASE ORAL DAILY
Qty: 90 TABLET | Refills: 3 | Status: SHIPPED | OUTPATIENT
Start: 2017-03-02 | End: 2017-03-02

## 2017-03-02 RX ORDER — METOPROLOL SUCCINATE 100 MG/1
100 TABLET, EXTENDED RELEASE ORAL DAILY
Qty: 90 TABLET | Refills: 3 | Status: SHIPPED | OUTPATIENT
Start: 2017-03-02 | End: 2018-04-26 | Stop reason: SDUPTHER

## 2017-03-02 NOTE — MR AVS SNAPSHOT
[facility]  [address] [facility] [place] [address]  Phone: [phone]                  Antolin Mcmillan   3/2/2017 3:40 PM   Office Visit    Description:  Male : 1950   Provider:  Gustavo Michael MD   Department:  [facility]           Reason for Visit     Coronary Artery Disease     Results                To Do List           Future Appointments        Provider Department Dept Phone    3/2/2017 3:40 PM Gustavo Michael MD [facility] [phone]      Goals (5 Years of Data)     None      [facility]     [facility] -  Assistance  Registered nurses in the [facility] provide clinical advisement, health education, appointment booking, and other advisory services.  Call for this free service at [phone].             Medications                Verify that the below list of medications is an accurate representation of the medications you are currently taking.  If none reported, the list may be blank. If incorrect, please contact your healthcare provider. Carry this list with you in case of emergency.           Current Medications     amitriptyline (ELAVIL) 50 MG tablet Take 50 mg by mouth every evening.    amlodipine (NORVASC) 10 MG tablet Take 1 tablet (10 mg total) by mouth once daily.    aspirin (ECOTRIN) 81 MG EC tablet Take 1 tablet (81 mg total) by mouth once daily.    atorvastatin (LIPITOR) 40 MG tablet Take 1 tablet (40 mg total) by mouth every evening.    chlorzoxazone (PARAFON FORTE) 500 mg Tab Take 500 mg by mouth 4 (four) times daily as needed.    metoprolol succinate (TOPROL-XL) 50 MG 24 hr tablet Take 50 mg by mouth once daily.    nicotine (NICODERM CQ) 21 mg/24 hr Place 1 patch onto the skin once daily.    nicotine, polacrilex, (NICORETTE) 2 mg Gum Take 1 each (2 mg total) by mouth as needed.    varenicline (CHANTIX STARTING MONTH BOX) 0.5 mg (11)- 1 mg (42) tablet Take one 0.5mg tab by mouth once  daily X3 days,then increase to one 0.5mg tab twice daily X4 days,then increase to one 1mg tab twice daily           Clinical Reference Information           Your Vitals Were     BP                   145/84           Blood Pressure          Most Recent Value    BP  (!)  145/84      Allergies as of 3/2/2017     No Known Allergies      Immunizations Administered on Date of Encounter - 3/2/2017     None      [facility] Sign-Up     Activating your [facility] account is as easy as 1-2-3!     1) Visit [email or web address], select Sign Up Now, enter this activation code and your date of birth, then select Next.  [email or web address]  Expires: 4/16/2017  3:13 PM      2) Create a username and password to use when you visit [facility] in the future and select a security question in case you lose your password and select Next.    3) Enter your e-mail address and click Sign Up!    Additional Information  If you have questions, please e-mail [email or web address] or call [phone] to talk to our [facility] staff. Remember, [facility] is NOT to be used for urgent needs. For medical emergencies, dial 911.         Smoking Cessation     If you would like to quit smoking:   You may be eligible for free services if you are a [place] resident and started smoking cigarettes before September 1, 1988.  Call the Smoking Cessation Trust ([facility]) toll free at (027) 379-3744 or (990) 320-5860.   Call 1-800-QUIT-NOW if you do not meet the above criteria.            Language Assistance Services     ATTENTION: Language assistance services are available, free of charge. Please call [phone].      ATENCIÓN: Si habla español, tiene a jensen disposición servicios gratuitos de asistencia lingüística. Llame al [phone].     CHÚ Ý: N?u b?n nói Ti?ng Vi?t, có các d?ch v? h? tr? ngôn ng? mi?n phí dành cho b?n. G?i s? [phone].         [facility] complies with applicable Federal civil rights laws and does not discriminate on the basis of  race, color, national origin, age, disability, or sex.

## 2017-03-02 NOTE — PROGRESS NOTES
CARDIOVASCULAR PROGRESS NOTE    REASON FOR CONSULT:   Antolin Mcmillan is a 66 y.o. male who presents for follow up of CAD, HTN, AAA repair.    PCP: Jimmie  HISTORY OF PRESENT ILLNESS:   The patient presents for follow-up.  He reports a generally asymptomatic status without angina or dyspnea.  He does describe occasional palpitations, particularly when laying down in bed in the evening.  He denies any lightheadedness, dizziness, or syncope.  There's been no PND, orthopnea, or lower extremity edema.  He denies melena, hematuria, or claudicant symptoms.    The patient unfortunately does continue to smoke, although he is cutting down quite a bit.  I've encouraged him to completely quit smoking.  He is also asked me if it's okay for him to donate blood, and while I don't have a problem with him donate blood, he is on several medications, and I've asked him to talk to the blood bank in [place] whether or not he would qualify for being a blood donor.    CARDIOVASCULAR HISTORY:   AAA s/p endo repair 12/2016 ([facility])  CAD hx PCI 2009    PAST MEDICAL HISTORY:     Past Medical History:   Diagnosis Date    AAA (abdominal aortic aneurysm) 12/2016    s/p endo repair (Fela)    Acid reflux     Anxiety     Coronary artery disease     Hypertension        PAST SURGICAL HISTORY:     Past Surgical History:   Procedure Laterality Date    APPENDECTOMY      CORONARY ANGIOPLASTY  2009       ALLERGIES AND MEDICATION:   Review of patient's allergies indicates:  No Known Allergies  Previous Medications    AMITRIPTYLINE (ELAVIL) 50 MG TABLET    Take 50 mg by mouth every evening.    AMLODIPINE (NORVASC) 10 MG TABLET    Take 1 tablet (10 mg total) by mouth once daily.    ASPIRIN (ECOTRIN) 81 MG EC TABLET    Take 1 tablet (81 mg total) by mouth once daily.    ATORVASTATIN (LIPITOR) 40 MG TABLET    Take 1 tablet (40 mg total) by mouth every evening.    CHLORZOXAZONE (PARAFON FORTE) 500 MG TAB    Take 500 mg by mouth 4  (four) times daily as needed.    METOPROLOL SUCCINATE (TOPROL-XL) 50 MG 24 HR TABLET    Take 50 mg by mouth once daily.    NICOTINE (NICODERM CQ) 21 MG/24 HR    Place 1 patch onto the skin once daily.    NICOTINE, POLACRILEX, (NICORETTE) 2 MG GUM    Take 1 each (2 mg total) by mouth as needed.    VARENICLINE (CHANTIX STARTING MONTH BOX) 0.5 MG (11)- 1 MG (42) TABLET    Take one 0.5mg tab by mouth once daily X3 days,then increase to one 0.5mg tab twice daily X4 days,then increase to one 1mg tab twice daily       SOCIAL HISTORY:     Social History     Social History    Marital status:      Spouse name: N/A    Number of children: N/A    Years of education: N/A     Occupational History    Not on file.     Social History Main Topics    Smoking status: Smoker, Current Status Unknown     Packs/day: 2.00     Years: 20.00    Smokeless tobacco: Not on file    Alcohol use No    Drug use: No    Sexual activity: Not on file     Other Topics Concern    Not on file     Social History Narrative       FAMILY HISTORY:   History reviewed. No pertinent family history.    REVIEW OF SYSTEMS:   Review of Systems   Constitutional: Negative for chills, diaphoresis and fever.   HENT: Negative for nosebleeds.    Eyes: Negative for blurred vision, double vision and photophobia.   Respiratory: Negative for hemoptysis, shortness of breath and wheezing.    Cardiovascular: Negative for chest pain, palpitations, orthopnea, claudication, leg swelling and PND.   Gastrointestinal: Negative for abdominal pain, blood in stool, heartburn, melena, nausea and vomiting.   Genitourinary: Negative for flank pain and hematuria.   Musculoskeletal: Negative for falls, joint pain, myalgias and neck pain.   Skin: Negative for rash.   Neurological: Negative for dizziness, tingling, seizures, loss of consciousness, weakness and headaches.   Endo/Heme/Allergies: Negative for polydipsia. Does not bruise/bleed easily.   Psychiatric/Behavioral: Negative  "for depression and memory loss. The patient is not nervous/anxious.        PHYSICAL EXAM:     Vitals:    03/02/17 1511   BP: (!) 145/84   Pulse: 74   Resp: 15    Body mass index is 26.07 kg/(m^2).  Weight: 84.8 kg (186 lb 15.2 oz)   Height: 5' 11" (180.3 cm)     Physical Exam   Constitutional: He is oriented to person, place, and time. He appears well-developed and well-nourished. He is cooperative.  Non-toxic appearance. No distress.   HENT:   Head: Normocephalic and atraumatic.   Eyes: Conjunctivae and EOM are normal. Pupils are equal, round, and reactive to light. No scleral icterus.   Neck: Trachea normal and normal range of motion. Neck supple. Normal carotid pulses and no JVD present. Carotid bruit is not present. No rigidity. No edema present. No thyromegaly present.   Cardiovascular: Normal rate, regular rhythm, S1 normal and S2 normal.  PMI is not displaced.  Exam reveals no gallop and no friction rub.    No murmur heard.  Pulses:       Carotid pulses are 2+ on the right side, and 2+ on the left side.  Pulmonary/Chest: Effort normal and breath sounds normal. No respiratory distress. He has no wheezes. He has no rales. He exhibits no tenderness.   Abdominal: Soft. Bowel sounds are normal. He exhibits no distension and no mass. There is no hepatosplenomegaly. There is no tenderness.   Musculoskeletal: He exhibits no edema or tenderness.   Feet:   Right Foot:   Skin Integrity: Negative for ulcer.   Left Foot:   Skin Integrity: Negative for ulcer.   Neurological: He is alert and oriented to person, place, and time. No cranial nerve deficit.   Skin: Skin is warm and dry. No rash noted. No erythema.   Psychiatric: He has a normal mood and affect. His speech is normal and behavior is normal.   Vitals reviewed.      DATA:   EKG: (personally reviewed tracing)  12/8/16 SB 44, o/w normal    Laboratory:  CBC:    Recent Labs  Lab 03/11/14  0605 12/08/16  1319 12/13/16  0350   WHITE BLOOD CELL COUNT 8.33 10.28 8.16 "   HEMOGLOBIN 15.6 15.9 12.8 L   HEMATOCRIT 45.3 46.8 38.5 L   PLATELETS 256 264 149 L       CHEMISTRIES:    Recent Labs  Lab 03/10/14  1915  12/08/16  1319 12/13/16  0349 12/14/16  0407   GLUCOSE 82  < > 80 100 104   SODIUM 139  < > 140 140 137   POTASSIUM 3.4 L  < > 4.5 3.4 L 3.8   BUN BLD 14  < > 15 12 11   CREATININE 1.1  < > 1.1 1.1 1.0   EGFR IF  >60  < > >60 >60 >60   EGFR IF NON- >60  < > >60 >60 >60   CALCIUM 10.1  < > 9.7 8.0 L 8.9   MAGNESIUM 1.7  --   --   --  2.0   < > = values in this interval not displayed.    CARDIAC BIOMARKERS:    Recent Labs  Lab 03/10/14  1915 03/11/14  0605 03/11/14  1152   TROPONIN I 0.008 <0.006 <0.006       COAGS:    Recent Labs  Lab 03/10/14  1915   INR 1.0       LIPIDS/LFTS:    Recent Labs  Lab 03/10/14  1915 12/13/16  0349   CHOLESTEROL  --  171   TRIGLYCERIDES  --  219 H   HDL  --  23 L   LDL CHOLESTEROL  --  104.2   NON-HDL CHOLESTEROL  --  148   AST 19 22   ALT 19 28       Cardiovascular Testing:  Ex MPI 1/10/17  The patient exercised for 5.5 minutes on a High Ramp protocol, corresponding to a functional capacity of 7 estimated METS, achieving a peak heart rate of 151 bpm, which is 97% of the age predicted maximum heart rate. -CP/EKG.  Nuclear Quantitative Functional Analysis:   LVEF: 49 %  Impression: NORMAL MYOCARDIAL PERFUSION  1. The perfusion scan is free of evidence for myocardial ischemia or injury.   2. There is a mild intensity fixed defect in the inferior wall of the left ventricle, secondary to diaphragm attenuation.   3. Resting wall motion is physiologic.   4. There is resting LV dysfunction with a reduced ejection fraction of 49 %.   5. The ventricular volumes are normal at rest and stress.   6. The extracardiac distribution of radioactivity is normal.   7. When compared to the previous study from 03/11/2014, no change    LE art US 1/9/17  1.  With regards to the right lower extremity there is no evidence of hemodynamically  significant peripheral vascular disease. LASHAUN 1.07  2.  With regards to the left lower extremity there is no evidence of hemodynamically significant peripheral vascular disease. LASHAUN 1.08    Stress Test: L MPI 3/11/14  Nuclear Quantitative Functional Analysis:   LVEF: >= 70 %  Impression: NORMAL MYOCARDIAL PERFUSION  1. The perfusion scan is free of evidence for myocardial ischemia or injury.   2. There is a moderate intensity fixed defect in the inferior wall of the left ventricle, secondary to diaphragm attenuation.   3. Resting wall motion is physiologic.   4. Resting LV function is normal.   5. The ventricular volumes are normal at rest and stress.   6. The extracardiac distribution of radioactivity is normal.     ASSESSMENT:   # AAA s/p endovasc repair 12/2016 ([facility])  # HTN, uncontrolled  # CAD, hx of distant PCI, MPI 1/2017 normal  # elev TG/LDL  # tob abuse  # palps, episodic    PLAN:   Cont med rx  Titarte toprol xl to 100mg qd  Continue smoking class  Follow up planned with Dr. Chahal re: AAA repair  Check lipids/LFT 2 months (late Apr 2017)  RTC 2 months    Gustavo Michael MD, FACC

## 2017-03-03 ENCOUNTER — TELEPHONE (OUTPATIENT)
Dept: CARDIOLOGY | Facility: CLINIC | Age: 67
End: 2017-03-03

## 2017-03-08 ENCOUNTER — CLINICAL SUPPORT (OUTPATIENT)
Dept: SMOKING CESSATION | Facility: CLINIC | Age: 67
End: 2017-03-08
Payer: COMMERCIAL

## 2017-03-08 DIAGNOSIS — F17.200 NICOTINE DEPENDENCE: Primary | ICD-10-CM

## 2017-03-08 PROCEDURE — 99407 BEHAV CHNG SMOKING > 10 MIN: CPT | Mod: S$GLB,,,

## 2017-03-08 RX ORDER — VARENICLINE TARTRATE 1 MG/1
1 TABLET, FILM COATED ORAL 2 TIMES DAILY
Qty: 60 TABLET | Refills: 0 | Status: SHIPPED | OUTPATIENT
Start: 2017-03-08 | End: 2017-04-07

## 2017-03-13 ENCOUNTER — CLINICAL SUPPORT (OUTPATIENT)
Dept: SMOKING CESSATION | Facility: CLINIC | Age: 67
End: 2017-03-13
Payer: COMMERCIAL

## 2017-03-13 DIAGNOSIS — F17.200 NICOTINE DEPENDENCE: Primary | ICD-10-CM

## 2017-03-13 PROCEDURE — 99404 PREV MED CNSL INDIV APPRX 60: CPT | Mod: S$GLB,,,

## 2017-03-13 PROCEDURE — 99999 PR PBB SHADOW E&M-EST. PATIENT-LVL I: CPT | Mod: PBBFAC,,,

## 2017-03-13 NOTE — Clinical Note
Pt seen in office today. He continues to smoke 20 cigs/day. Pt remains on tobacco cessation medications of chantix starter pack. No adverse effects  or mental changes noted at this time.  Pt doing well with rate reduction and wait times prior to smoking. Pt asked to reduce current smoking rate to 12 cigs/day. Reviewed coping strategies/habitual behavior/relapse prevention with patient. Exhaled carbon monoxide level was 11per Smokerlyzer. Will see pt back in office in 1 week.

## 2017-03-13 NOTE — PROGRESS NOTES
Individual Follow-Up Form    3/13/2017    Quit Date: 4/3/17    Clinical Status of Patient: Outpatient    Length of Service: 60 minutes    Continuing Medication: yes  Chantix    Other Medications: none     Target Symptoms: Withdrawal and medication side effects. The following were  rated moderate (3) to severe (4) on TCRS:  · Moderate (3): none  · Severe (4): none    Comments: Pt seen in office today. He continues to smoke 20 cigs/day. Pt remains on tobacco cessation medications of chantix starter pack. No adverse effects  or mental changes noted at this time.   Pt doing well with rate reduction and wait times prior to smoking. Pt asked to reduce current smoking rate to 12 cigs/day. Reviewed coping strategies/habitual behavior/relapse prevention with patient.   Exhaled carbon monoxide level was 11per Smokerlyzer. Will see pt back in office in 1 week.      Diagnosis: F17.200    Next Visit: 1 week

## 2017-04-17 ENCOUNTER — LAB VISIT (OUTPATIENT)
Dept: LAB | Facility: HOSPITAL | Age: 67
End: 2017-04-17
Attending: INTERNAL MEDICINE
Payer: MEDICARE

## 2017-04-17 DIAGNOSIS — I71.40 ABDOMINAL AORTIC ANEURYSM (AAA) WITHOUT RUPTURE: ICD-10-CM

## 2017-04-17 DIAGNOSIS — I25.10 CORONARY ARTERY DISEASE INVOLVING NATIVE CORONARY ARTERY OF NATIVE HEART WITHOUT ANGINA PECTORIS: ICD-10-CM

## 2017-04-17 DIAGNOSIS — E78.5 HYPERLIPIDEMIA, UNSPECIFIED HYPERLIPIDEMIA TYPE: ICD-10-CM

## 2017-04-17 LAB
ALBUMIN SERPL BCP-MCNC: 3.6 G/DL
ALP SERPL-CCNC: 61 U/L
ALT SERPL W/O P-5'-P-CCNC: 33 U/L
AST SERPL-CCNC: 26 U/L
BILIRUB DIRECT SERPL-MCNC: 0.1 MG/DL
BILIRUB SERPL-MCNC: 0.4 MG/DL
CHOLEST/HDLC SERPL: 4.9 {RATIO}
HDL/CHOLESTEROL RATIO: 20.6 %
HDLC SERPL-MCNC: 141 MG/DL
HDLC SERPL-MCNC: 29 MG/DL
LDLC SERPL CALC-MCNC: 84 MG/DL
NONHDLC SERPL-MCNC: 112 MG/DL
PROT SERPL-MCNC: 7.4 G/DL
TRIGL SERPL-MCNC: 140 MG/DL

## 2017-04-17 PROCEDURE — 80076 HEPATIC FUNCTION PANEL: CPT

## 2017-04-17 PROCEDURE — 36415 COLL VENOUS BLD VENIPUNCTURE: CPT

## 2017-04-17 PROCEDURE — 80061 LIPID PANEL: CPT

## 2017-05-04 ENCOUNTER — OFFICE VISIT (OUTPATIENT)
Dept: CARDIOLOGY | Facility: CLINIC | Age: 67
End: 2017-05-04
Payer: MEDICARE

## 2017-05-04 VITALS
HEART RATE: 62 BPM | SYSTOLIC BLOOD PRESSURE: 151 MMHG | RESPIRATION RATE: 15 BRPM | BODY MASS INDEX: 26.51 KG/M2 | HEIGHT: 71 IN | DIASTOLIC BLOOD PRESSURE: 87 MMHG | WEIGHT: 189.38 LBS | OXYGEN SATURATION: 97 %

## 2017-05-04 DIAGNOSIS — I71.40 ABDOMINAL AORTIC ANEURYSM (AAA) WITHOUT RUPTURE: ICD-10-CM

## 2017-05-04 DIAGNOSIS — I10 HYPERTENSION, ESSENTIAL: Primary | ICD-10-CM

## 2017-05-04 DIAGNOSIS — E78.5 HYPERLIPIDEMIA, UNSPECIFIED HYPERLIPIDEMIA TYPE: ICD-10-CM

## 2017-05-04 DIAGNOSIS — F17.200 CURRENT EVERY DAY SMOKER: Chronic | ICD-10-CM

## 2017-05-04 DIAGNOSIS — I25.10 CORONARY ARTERY DISEASE INVOLVING NATIVE CORONARY ARTERY OF NATIVE HEART WITHOUT ANGINA PECTORIS: ICD-10-CM

## 2017-05-04 PROCEDURE — 99999 PR PBB SHADOW E&M-EST. PATIENT-LVL III: CPT | Mod: PBBFAC,,, | Performed by: INTERNAL MEDICINE

## 2017-05-04 PROCEDURE — 3077F SYST BP >= 140 MM HG: CPT | Mod: S$GLB,,, | Performed by: INTERNAL MEDICINE

## 2017-05-04 PROCEDURE — 99214 OFFICE O/P EST MOD 30 MIN: CPT | Mod: S$GLB,,, | Performed by: INTERNAL MEDICINE

## 2017-05-04 PROCEDURE — 1160F RVW MEDS BY RX/DR IN RCRD: CPT | Mod: S$GLB,,, | Performed by: INTERNAL MEDICINE

## 2017-05-04 PROCEDURE — 3079F DIAST BP 80-89 MM HG: CPT | Mod: S$GLB,,, | Performed by: INTERNAL MEDICINE

## 2017-05-04 RX ORDER — LISINOPRIL 5 MG/1
5 TABLET ORAL DAILY
Qty: 90 TABLET | Refills: 3 | Status: SHIPPED | OUTPATIENT
Start: 2017-05-04 | End: 2017-05-09 | Stop reason: SDUPTHER

## 2017-05-04 NOTE — MR AVS SNAPSHOT
[facility]  [address] [facility] [place] [address]  Phone: [phone]                  Antolin Sunmersaul   2017 3:20 PM   Office Visit    Description:  Male : 1950   Provider:  Gustavo Michael MD   [place]  [facility]           Reason for Visit     Coronary Artery Disease     Results                To Do List           Future Appointments        Provider Department Dept Phone    2017 3:20 PM Gustavo Michael MD [facility] [phone]      Goals (5 Years of Data)     None      [facility]     [facility] On Call Nurse Care Line -  Assistance  Unless otherwise directed by your provider, please contact [facility], our nurse care line that is available for  assistance.     Registered nurses in the [facility] provide: appointment scheduling, clinical advisement, health education, and other advisory services.  Call: [phone] (toll free)               Medications                Verify that the below list of medications is an accurate representation of the medications you are currently taking.  If none reported, the list may be blank. If incorrect, please contact your healthcare provider. Carry this list with you in case of emergency.           Current Medications     amitriptyline (ELAVIL) 50 MG tablet Take 50 mg by mouth every evening.    amlodipine (NORVASC) 10 MG tablet Take 1 tablet (10 mg total) by mouth once daily.    aspirin (ECOTRIN) 81 MG EC tablet Take 1 tablet (81 mg total) by mouth once daily.    atorvastatin (LIPITOR) 40 MG tablet Take 1 tablet (40 mg total) by mouth every evening.    chlorzoxazone (PARAFON FORTE) 500 mg Tab Take 500 mg by mouth 4 (four) times daily as needed.    metoprolol succinate (TOPROL-XL) 100 MG 24 hr tablet Take 1 tablet (100 mg total) by mouth once daily.    nicotine (NICODERM CQ) 21 mg/24 hr Place 1 patch onto the skin once daily.    nicotine, polacrilex, (NICORETTE) 2 mg Gum Take  "1 each (2 mg total) by mouth as needed.           Clinical Reference Information           Your Vitals Were     BP Pulse Resp Height Weight SpO2    151/87 62 15 5' 11" (1.803 m) 85.9 kg (189 lb 6 oz) 97%    BMI                26.41 kg/m2          Blood Pressure          Most Recent Value    BP  (!)  151/87      Allergies as of 5/4/2017     No Known Allergies      Immunizations Administered on Date of Encounter - 5/4/2017     None      [facility] Sign-Up     Activating your [facility] account is as easy as 1-2-3!     1) Visit [email or web address], select Sign Up Now, enter this activation code and your date of birth, then select Next.  [email or web address]  Expires: 6/18/2017  3:00 PM      2) Create a username and password to use when you visit [facility] in the future and select a security question in case you lose your password and select Next.    3) Enter your e-mail address and click Sign Up!    Additional Information  If you have questions, please e-mail [email or web address] or call [phone] to talk to our [facility] staff. Remember, [facility] is NOT to be used for urgent needs. For medical emergencies, dial 911.         Smoking Cessation     If you would like to quit smoking:   You may be eligible for free services if you are a [place] resident and started smoking cigarettes before September 1, 1988.  Call the Smoking Cessation Trust ([facility]) toll free at (627) 182-3226 or [phone].   Call 1-800-QUIT-NOW if you do not meet the above criteria.   Contact us via email: [email or web address]   View our website for more information: [email or web address]        Language Assistance Services     ATTENTION: Language assistance services are available, free of charge. Please call [phone].      ATENCIÓN: Si habla español, tiene a jensen disposición servicios gratuitos de asistencia lingüística. Llame al [phone].     CHÚ Ý: N?u b?n nói Ti?ng Vi?t, có các d?ch v? h? tr? ngôn ng? mi?n phí dành cho b?n. G?i " s? [phone].         [facility] complies with applicable Federal civil rights laws and does not discriminate on the basis of race, color, national origin, age, disability, or sex.

## 2017-05-04 NOTE — PROGRESS NOTES
CARDIOVASCULAR PROGRESS NOTE    REASON FOR CONSULT:   Antolin Mcmillan is a 66 y.o. male who presents for follow up of CAD, HTN, AAA repair.    PCP: Jimmie  HISTORY OF PRESENT ILLNESS:   The patient presents for follow-up.  He reports generally stable status without angina or dyspnea.  There's been no palpitations, lightheadedness, dizziness, or syncope.  He denies PND, orthopnea, or lower extremity edema.  There's been no melena, hematuria, or claudicant symptoms.  Unfortunately, the patient does continue to smoke cigarettes, and I have again strongly encouraged him to quit smoking.    CARDIOVASCULAR HISTORY:   AAA s/p endo repair 12/2016 (Fela/Gerda)  CAD hx PCI 2009    PAST MEDICAL HISTORY:     Past Medical History:   Diagnosis Date    AAA (abdominal aortic aneurysm) 12/2016    s/p endo repair (Fela)    Acid reflux     Anxiety     Coronary artery disease     Hypertension        PAST SURGICAL HISTORY:     Past Surgical History:   Procedure Laterality Date    APPENDECTOMY      CORONARY ANGIOPLASTY  2009       ALLERGIES AND MEDICATION:   Review of patient's allergies indicates:  No Known Allergies  Previous Medications    AMITRIPTYLINE (ELAVIL) 50 MG TABLET    Take 50 mg by mouth every evening.    AMLODIPINE (NORVASC) 10 MG TABLET    Take 1 tablet (10 mg total) by mouth once daily.    ASPIRIN (ECOTRIN) 81 MG EC TABLET    Take 1 tablet (81 mg total) by mouth once daily.    ATORVASTATIN (LIPITOR) 40 MG TABLET    Take 1 tablet (40 mg total) by mouth every evening.    CHLORZOXAZONE (PARAFON FORTE) 500 MG TAB    Take 500 mg by mouth 4 (four) times daily as needed.    METOPROLOL SUCCINATE (TOPROL-XL) 100 MG 24 HR TABLET    Take 1 tablet (100 mg total) by mouth once daily.    NICOTINE (NICODERM CQ) 21 MG/24 HR    Place 1 patch onto the skin once daily.    NICOTINE, POLACRILEX, (NICORETTE) 2 MG GUM    Take 1 each (2 mg total) by mouth as needed.       SOCIAL HISTORY:     Social History     Social  "History    Marital status:      Spouse name: N/A    Number of children: N/A    Years of education: N/A     Occupational History    Not on file.     Social History Main Topics    Smoking status: Smoker, Current Status Unknown     Packs/day: 2.00     Years: 20.00    Smokeless tobacco: Not on file    Alcohol use No    Drug use: No    Sexual activity: Not on file     Other Topics Concern    Not on file     Social History Narrative       FAMILY HISTORY:   History reviewed. No pertinent family history.    REVIEW OF SYSTEMS:   Review of Systems   Constitutional: Negative for chills, diaphoresis and fever.   HENT: Negative for nosebleeds.    Eyes: Negative for blurred vision, double vision and photophobia.   Respiratory: Negative for hemoptysis, shortness of breath and wheezing.    Cardiovascular: Negative for chest pain, palpitations, orthopnea, claudication, leg swelling and PND.   Gastrointestinal: Negative for abdominal pain, blood in stool, heartburn, melena, nausea and vomiting.   Genitourinary: Negative for flank pain and hematuria.   Musculoskeletal: Negative for falls, joint pain, myalgias and neck pain.   Skin: Negative for rash.   Neurological: Negative for dizziness, tingling, seizures, loss of consciousness, weakness and headaches.   Endo/Heme/Allergies: Negative for polydipsia. Does not bruise/bleed easily.   Psychiatric/Behavioral: Negative for depression and memory loss. The patient is not nervous/anxious.        PHYSICAL EXAM:     Vitals:    05/04/17 1457   BP: (!) 151/87   Pulse: 62   Resp: 15    Body mass index is 26.41 kg/(m^2).  Weight: 85.9 kg (189 lb 6 oz)   Height: 5' 11" (180.3 cm)     Physical Exam   Constitutional: He is oriented to person, place, and time. He appears well-developed and well-nourished. He is cooperative.  Non-toxic appearance. No distress.   HENT:   Head: Normocephalic and atraumatic.   Eyes: Conjunctivae and EOM are normal. Pupils are equal, round, and reactive " to light. No scleral icterus.   Neck: Trachea normal and normal range of motion. Neck supple. Normal carotid pulses and no JVD present. Carotid bruit is not present. No rigidity. No edema present. No thyromegaly present.   Cardiovascular: Normal rate, regular rhythm, S1 normal and S2 normal.  PMI is not displaced.  Exam reveals no gallop and no friction rub.    No murmur heard.  Pulses:       Carotid pulses are 2+ on the right side, and 2+ on the left side.  Pulmonary/Chest: Effort normal and breath sounds normal. No respiratory distress. He has no wheezes. He has no rales. He exhibits no tenderness.   Abdominal: Soft. Bowel sounds are normal. He exhibits no distension and no mass. There is no hepatosplenomegaly. There is no tenderness.   Musculoskeletal: He exhibits no edema or tenderness.   Feet:   Right Foot:   Skin Integrity: Negative for ulcer.   Left Foot:   Skin Integrity: Negative for ulcer.   Neurological: He is alert and oriented to person, place, and time. No cranial nerve deficit.   Skin: Skin is warm and dry. No rash noted. No erythema.   Psychiatric: He has a normal mood and affect. His speech is normal and behavior is normal.   Vitals reviewed.      DATA:   EKG: (personally reviewed tracing)  12/8/16 SB 44, o/w normal    Laboratory:  CBC:    Recent Labs  Lab 12/08/16  1319 12/13/16  0350   WHITE BLOOD CELL COUNT 10.28 8.16   HEMOGLOBIN 15.9 12.8 L   HEMATOCRIT 46.8 38.5 L   PLATELETS 264 149 L       CHEMISTRIES:    Recent Labs  Lab 12/08/16  1319 12/13/16  0349 12/14/16  0407   GLUCOSE 80 100 104   SODIUM 140 140 137   POTASSIUM 4.5 3.4 L 3.8   BUN BLD 15 12 11   CREATININE 1.1 1.1 1.0   EGFR IF [place]ICAN >60 >60 >60   EGFR IF NON- >60 >60 >60   CALCIUM 9.7 8.0 L 8.9   MAGNESIUM  --   --  2.0       CARDIAC BIOMARKERS:        COAGS:        LIPIDS/LFTS:    Recent Labs  Lab 12/13/16  0349 04/17/17  0740   CHOLESTEROL 171 141   TRIGLYCERIDES 219 H 140   HDL 23 L 29 L   LDL  CHOLESTEROL 104.2 84.0   NON-HDL CHOLESTEROL 148 112   AST 22 26   ALT 28 33       Cardiovascular Testing:  Ex MPI 1/10/17  The patient exercised for 5.5 minutes on a High Ramp protocol, corresponding to a functional capacity of 7 estimated METS, achieving a peak heart rate of 151 bpm, which is 97% of the age predicted maximum heart rate. -CP/EKG.  Nuclear Quantitative Functional Analysis:   LVEF: 49 %  Impression: NORMAL MYOCARDIAL PERFUSION  1. The perfusion scan is free of evidence for myocardial ischemia or injury.   2. There is a mild intensity fixed defect in the inferior wall of the left ventricle, secondary to diaphragm attenuation.   3. Resting wall motion is physiologic.   4. There is resting LV dysfunction with a reduced ejection fraction of 49 %.   5. The ventricular volumes are normal at rest and stress.   6. The extracardiac distribution of radioactivity is normal.   7. When compared to the previous study from 03/11/2014, no change    LE art US 1/9/17  1.  With regards to the right lower extremity there is no evidence of hemodynamically significant peripheral vascular disease. LASHAUN 1.07  2.  With regards to the left lower extremity there is no evidence of hemodynamically significant peripheral vascular disease. LASHAUN 1.08    Stress Test: L MPI 3/11/14  Nuclear Quantitative Functional Analysis:   LVEF: >= 70 %  Impression: NORMAL MYOCARDIAL PERFUSION  1. The perfusion scan is free of evidence for myocardial ischemia or injury.   2. There is a moderate intensity fixed defect in the inferior wall of the left ventricle, secondary to diaphragm attenuation.   3. Resting wall motion is physiologic.   4. Resting LV function is normal.   5. The ventricular volumes are normal at rest and stress.   6. The extracardiac distribution of radioactivity is normal.     ASSESSMENT:   # AAA s/p endovasc repair 12/2016 (Fela/Gerda)  # HTN, uncontrolled  # CAD, hx of distant PCI, MPI 1/2017 normal  # HLP, LDL  acceptable  # tob abuse    PLAN:   Cont med rx  Add lisinopril 5mg qd  Check BMP 2 weeks  Continue smoking class  Follow up planned with Dr. Chahal re: AAA repair  RTC 1 month    Gustavo Michael MD, FACC

## 2017-05-09 RX ORDER — LISINOPRIL 5 MG/1
5 TABLET ORAL DAILY
Qty: 90 TABLET | Refills: 3 | Status: SHIPPED | OUTPATIENT
Start: 2017-05-09 | End: 2017-05-25 | Stop reason: SDUPTHER

## 2017-05-22 ENCOUNTER — LAB VISIT (OUTPATIENT)
Dept: LAB | Facility: HOSPITAL | Age: 67
End: 2017-05-22
Attending: INTERNAL MEDICINE
Payer: MEDICARE

## 2017-05-22 DIAGNOSIS — I10 HYPERTENSION, ESSENTIAL: ICD-10-CM

## 2017-05-22 LAB
ANION GAP SERPL CALC-SCNC: 9 MMOL/L
BUN SERPL-MCNC: 19 MG/DL
CALCIUM SERPL-MCNC: 9.7 MG/DL
CHLORIDE SERPL-SCNC: 108 MMOL/L
CO2 SERPL-SCNC: 22 MMOL/L
CREAT SERPL-MCNC: 1.2 MG/DL
EST. GFR  (AFRICAN AMERICAN): >60 ML/MIN/1.73 M^2
EST. GFR  (NON AFRICAN AMERICAN): >60 ML/MIN/1.73 M^2
GLUCOSE SERPL-MCNC: 132 MG/DL
POTASSIUM SERPL-SCNC: 4 MMOL/L
SODIUM SERPL-SCNC: 139 MMOL/L

## 2017-05-22 PROCEDURE — 36415 COLL VENOUS BLD VENIPUNCTURE: CPT

## 2017-05-22 PROCEDURE — 80048 BASIC METABOLIC PNL TOTAL CA: CPT

## 2017-05-25 RX ORDER — LISINOPRIL 5 MG/1
5 TABLET ORAL DAILY
Qty: 90 TABLET | Refills: 3 | Status: SHIPPED | OUTPATIENT
Start: 2017-05-25 | End: 2017-06-07

## 2017-05-29 ENCOUNTER — HOSPITAL ENCOUNTER (EMERGENCY)
Facility: HOSPITAL | Age: 67
Discharge: HOME | End: 2017-05-29
Attending: EMERGENCY MEDICINE
Payer: MEDICARE

## 2017-05-29 VITALS
RESPIRATION RATE: 20 BRPM | HEIGHT: 67 IN | TEMPERATURE: 98 F | SYSTOLIC BLOOD PRESSURE: 130 MMHG | BODY MASS INDEX: 28.25 KG/M2 | WEIGHT: 180 LBS | HEART RATE: 60 BPM | OXYGEN SATURATION: 97 % | DIASTOLIC BLOOD PRESSURE: 70 MMHG

## 2017-05-29 DIAGNOSIS — R21 RASH OF UNKNOWN CAUSE: Primary | ICD-10-CM

## 2017-05-29 PROCEDURE — 99283 EMERGENCY DEPT VISIT LOW MDM: CPT

## 2017-05-29 RX ORDER — TRIAMCINOLONE ACETONIDE 5 MG/G
CREAM TOPICAL 2 TIMES DAILY
Qty: 15 G | Refills: 0 | Status: SHIPPED | OUTPATIENT
Start: 2017-05-29 | End: 2017-11-17

## 2017-05-29 NOTE — DISCHARGE INSTRUCTIONS
Use kenalog steroid cream topically for rash. Use benadryl OTC for relief of itching. Return to ED if any problems occur.

## 2017-05-29 NOTE — ED PROVIDER NOTES
Encounter Date: 5/29/2017       History     Chief Complaint   Patient presents with    Rash     Pt. presents with rash to bilateral forearms and back of neck that began this morning. Pt. reports the rash to be itchy.      Review of patient's allergies indicates:  No Known Allergies  66-year-old male with past medical history hypertension, acid reflux, anxiety, AAA, coronary artery disease, presents the ED with chief complaint rash to bilateral forearms and posterior neck which began this morning.  Patient admits to pruritus.  He denies history of rash.  He denies any new medications.  He denies change in medications.  He denies fevers/chills/recent illness.  Patient denies shortness of breath or chest pain.  He denies stridor or wheezing.  He denies seasonal or drug/medicine ALLERGIES.  No radiation of symptoms.  Symptoms are constant.  No alleviating or exacerbating factors.  Patient states he was boiling crawfish at his house in [place] yesterday afternoon. No other abnormal exposure.        Past Medical History:   Diagnosis Date    AAA (abdominal aortic aneurysm) 12/2016    s/p endo repair ([facility])    Acid reflux     Anxiety     Coronary artery disease     Hypertension      Past Surgical History:   Procedure Laterality Date    APPENDECTOMY      CORONARY ANGIOPLASTY  2009     History reviewed. No pertinent family history.  Social History   Substance Use Topics    Smoking status: Smoker, Current Status Unknown     Packs/day: 2.00     Years: 20.00    Smokeless tobacco: Not on file    Alcohol use No     Review of Systems   Constitutional: Negative for activity change, chills and fever.   HENT: Negative for congestion, ear discharge, ear pain, facial swelling, rhinorrhea, sore throat and trouble swallowing.    Eyes: Negative for photophobia, pain, redness and visual disturbance.   Respiratory: Negative for apnea, cough, chest tightness, shortness of breath, wheezing and stridor.    Cardiovascular:  Negative for chest pain and leg swelling.   Gastrointestinal: Negative for abdominal pain, nausea and vomiting.   Endocrine: Negative.    Genitourinary: Negative.    Musculoskeletal: Negative for back pain, myalgias, neck pain and neck stiffness.   Skin: Positive for rash. Negative for color change, pallor and wound.   Allergic/Immunologic: Negative.    Neurological: Negative.    Hematological: Negative.    Psychiatric/Behavioral: Negative.    All other systems reviewed and are negative.      Physical Exam     Initial Vitals [05/29/17 1540]   BP Pulse Resp Temp SpO2   128/67 62 17 97.8 °F (36.6 °C) 97 %     Physical Exam    Nursing note and vitals reviewed.  Constitutional: Vital signs are normal. He appears well-developed and well-nourished. He is not diaphoretic. He is cooperative.  Non-toxic appearance. He does not have a sickly appearance. He does not appear ill. No distress.   HENT:   Head: Normocephalic and atraumatic.   Eyes: Conjunctivae and EOM are normal.   Neck: Normal range of motion. Neck supple. No tracheal deviation present.   Pulmonary/Chest: Effort normal and breath sounds normal. No accessory muscle usage or stridor. No tachypnea. No respiratory distress. He has no decreased breath sounds. He has no wheezes. He has no rhonchi. He exhibits no tenderness.   Abdominal: Soft. Bowel sounds are normal. He exhibits no distension and no mass. There is no tenderness. There is no guarding.   Musculoskeletal: Normal range of motion. He exhibits no tenderness.   Lymphadenopathy:     He has no cervical adenopathy.   Neurological: He is alert and oriented to person, place, and time. He has normal strength.   Skin: Skin is warm and dry. Capillary refill takes 2 to 3 seconds. Rash noted. No abscess noted. Rash is papular. No erythema.   Small, pink, diffusely scattered papules to dorsal aspect of bilateral forearms.  Also, small, pink, scattered papules to posterior neck.  No open or closed comedones.  No  target-like lesions.  No open or draining wounds. No warmth.   Psychiatric: He has a normal mood and affect. His behavior is normal. Judgment and thought content normal.         ED Course   Procedures  Labs Reviewed - No data to display          Medical Decision Making:   Initial Assessment:   66-year-old male chief complaint rash to bilateral forearms and posterior neck since this morning  Differential Diagnosis:   Rash of unknown cause, dermatitis, rash secondary to exposure, erythema multiforme, folliculitis, eczema  ED Management:  Patient overall well-appearing, no acute distress, afebrile, vital signs within normal limits.  Patient's chief complaint is pruritus secondary to rash.  He denies ever having rash like this before.  He has not taken anything for the rash or pruritus.  On physical exam, superficial, pink papules to bilateral dorsal forearms and posterior neck.  No tenderness to palpation. No palmar lesions. No intraoral lesions. No evidence of a herald patch, no target-like lesions.  I'm unsure of the cause of this rash, although I do suspect, given the rash is in the sun exposed regions, this may be secondary to exposure.  Overall, no systemic symptoms, no stridor or wheezing, no airway compromise.  I do think he is safe for discharge with steroid ointment and Benadryl over-the-counter as needed for pruritus.  I will have him follow with his primary care physician for reevaluation.  Patient does understand and agree with treatment plan.  Return precautions given.  Other:   I have discussed this case with another health care provider.       <> Summary of the Discussion: I have discussed this case with .              Attending Attestation:     Physician Attestation Statement for NP/PA:   I discussed this assessment and plan of this patient with the NP/PA, but I did not personally examine the patient. The face to face encounter was performed by the NP/PA.    Other NP/PA Attestation Additions:       Medical Decision Makin-year-old male presents complaining of rash to forearms bilaterally and posterior neck that began this morning.  He does report that he pulled cough.  Side yesterday.  Physical examination reveals a papular rash as described above.  There are no pulmonary lesions or oropharyngeal lesions.  No lesions to suggest erythema multiforme, TEN, SJS. Strongly suspect mild contact dermatitis. Agree with above assessment and plan.                      ED Course     Clinical Impression:   The encounter diagnosis was Rash of unknown cause.    Disposition:   Disposition: Discharged  Condition: Stable       Geovanny Dodson PA-C  [phone]       Zurdo Cadet III, MD  [phone]

## 2017-06-07 ENCOUNTER — OFFICE VISIT (OUTPATIENT)
Dept: CARDIOLOGY | Facility: CLINIC | Age: 67
End: 2017-06-07
Payer: MEDICARE

## 2017-06-07 VITALS
SYSTOLIC BLOOD PRESSURE: 127 MMHG | DIASTOLIC BLOOD PRESSURE: 77 MMHG | RESPIRATION RATE: 15 BRPM | BODY MASS INDEX: 29.65 KG/M2 | HEART RATE: 92 BPM | OXYGEN SATURATION: 96 % | HEIGHT: 67 IN | WEIGHT: 188.94 LBS

## 2017-06-07 DIAGNOSIS — N52.01 ERECTILE DYSFUNCTION DUE TO ARTERIAL INSUFFICIENCY: ICD-10-CM

## 2017-06-07 DIAGNOSIS — E78.5 HYPERLIPIDEMIA, UNSPECIFIED HYPERLIPIDEMIA TYPE: ICD-10-CM

## 2017-06-07 DIAGNOSIS — I10 HYPERTENSION, ESSENTIAL: ICD-10-CM

## 2017-06-07 DIAGNOSIS — I71.40 ABDOMINAL AORTIC ANEURYSM (AAA) WITHOUT RUPTURE: ICD-10-CM

## 2017-06-07 DIAGNOSIS — F17.200 CURRENT EVERY DAY SMOKER: Chronic | ICD-10-CM

## 2017-06-07 DIAGNOSIS — I25.10 CORONARY ARTERY DISEASE INVOLVING NATIVE CORONARY ARTERY OF NATIVE HEART WITHOUT ANGINA PECTORIS: Primary | ICD-10-CM

## 2017-06-07 PROCEDURE — 99999 PR PBB SHADOW E&M-EST. PATIENT-LVL III: CPT | Mod: PBBFAC,,, | Performed by: INTERNAL MEDICINE

## 2017-06-07 PROCEDURE — 99214 OFFICE O/P EST MOD 30 MIN: CPT | Mod: S$GLB,,, | Performed by: INTERNAL MEDICINE

## 2017-06-07 RX ORDER — SILDENAFIL 100 MG/1
100 TABLET, FILM COATED ORAL DAILY PRN
Qty: 10 TABLET | Refills: 3 | Status: SHIPPED | OUTPATIENT
Start: 2017-06-07 | End: 2018-09-25

## 2017-06-07 NOTE — PROGRESS NOTES
CARDIOVASCULAR PROGRESS NOTE    REASON FOR CONSULT:   Antolin Mcmillan is a 66 y.o. male who presents for follow up of CAD, HTN, AAA repair.    PCP: Jimmie  HISTORY OF PRESENT ILLNESS:   The patient presents for follow-up.  He reports generally stable status without angina or dyspnea.  There's been no palpitations, lightheadedness, dizziness, or syncope.  He denies PND, orthopnea, or lower extremity edema.  There's been no melena, hematuria, or claudicant symptoms.  Unfortunately, the patient does continue to smoke cigarettes, and I have again strongly encouraged him to quit smoking.  At his last visit, I prescribed lisinopril, and apparently the patient developed a rash upon initiation of the medication.  He had also had a crawfish boil the same day.  He was seen by a dermatologist, and she recommended that he stop the lisinopril.  Lastly, pt is complaining of ED.  He is not using nitrates.  I will prescribe PDE5 inhibitor.    CARDIOVASCULAR HISTORY:   AAA s/p endo repair 12/2016 ([facility])  CAD hx PCI 2009    PAST MEDICAL HISTORY:     Past Medical History:   Diagnosis Date    AAA (abdominal aortic aneurysm) 12/2016    s/p endo repair ([facility])    Acid reflux     Anxiety     Coronary artery disease     Hypertension        PAST SURGICAL HISTORY:     Past Surgical History:   Procedure Laterality Date    APPENDECTOMY      CORONARY ANGIOPLASTY  2009       ALLERGIES AND MEDICATION:   Review of patient's allergies indicates:  No Known Allergies  Previous Medications    AMITRIPTYLINE (ELAVIL) 50 MG TABLET    Take 50 mg by mouth every evening.    AMLODIPINE (NORVASC) 10 MG TABLET    Take 1 tablet (10 mg total) by mouth once daily.    ASPIRIN (ECOTRIN) 81 MG EC TABLET    Take 1 tablet (81 mg total) by mouth once daily.    ATORVASTATIN (LIPITOR) 40 MG TABLET    Take 1 tablet (40 mg total) by mouth every evening.    CHLORZOXAZONE (PARAFON FORTE) 500 MG TAB    Take 500 mg by mouth 4 (four) times daily as  needed.    LISINOPRIL (PRINIVIL,ZESTRIL) 5 MG TABLET    Take 1 tablet (5 mg total) by mouth once daily.    METOPROLOL SUCCINATE (TOPROL-XL) 100 MG 24 HR TABLET    Take 1 tablet (100 mg total) by mouth once daily.    NICOTINE (NICODERM CQ) 21 MG/24 HR    Place 1 patch onto the skin once daily.    NICOTINE, POLACRILEX, (NICORETTE) 2 MG GUM    Take 1 each (2 mg total) by mouth as needed.    TRIAMCINOLONE ACETONIDE 0.5% (KENALOG) 0.5 % CREA    Apply topically 2 (two) times daily.       SOCIAL HISTORY:     Social History     Social History    Marital status:      Spouse name: N/A    Number of children: N/A    Years of education: N/A     Occupational History    Not on file.     Social History Main Topics    Smoking status: Smoker, Current Status Unknown     Packs/day: 2.00     Years: 20.00    Smokeless tobacco: Not on file    Alcohol use No    Drug use: No    Sexual activity: Not on file     Other Topics Concern    Not on file     Social History Narrative    No narrative on file       FAMILY HISTORY:   History reviewed. No pertinent family history.    REVIEW OF SYSTEMS:   Review of Systems   Constitutional: Negative for chills, diaphoresis and fever.   HENT: Negative for nosebleeds.    Eyes: Negative for blurred vision, double vision and photophobia.   Respiratory: Negative for hemoptysis, shortness of breath and wheezing.    Cardiovascular: Negative for chest pain, palpitations, orthopnea, claudication, leg swelling and PND.   Gastrointestinal: Negative for abdominal pain, blood in stool, heartburn, melena, nausea and vomiting.   Genitourinary: Negative for flank pain and hematuria.   Musculoskeletal: Negative for falls, joint pain, myalgias and neck pain.   Skin: Negative for rash.   Neurological: Negative for dizziness, tingling, seizures, loss of consciousness, weakness and headaches.   Endo/Heme/Allergies: Negative for polydipsia. Does not bruise/bleed easily.   Psychiatric/Behavioral: Negative for  "depression and memory loss. The patient is not nervous/anxious.        PHYSICAL EXAM:     Vitals:    06/07/17 1428   BP: 127/77   Pulse: 92   Resp: 15    Body mass index is 29.59 kg/m².  Weight: 85.7 kg (188 lb 15 oz)   Height: 5' 7" (170.2 cm)     Physical Exam   Constitutional: He is oriented to person, place, and time. He appears well-developed and well-nourished. He is cooperative.  Non-toxic appearance. No distress.   HENT:   Head: Normocephalic and atraumatic.   Eyes: Conjunctivae and EOM are normal. Pupils are equal, round, and reactive to light. No scleral icterus.   Neck: Trachea normal and normal range of motion. Neck supple. Normal carotid pulses and no JVD present. Carotid bruit is not present. No no neck rigidity. No edema present. No thyromegaly present.   Cardiovascular: Normal rate, regular rhythm, S1 normal and S2 normal.  PMI is not displaced.  Exam reveals no gallop and no friction rub.    No murmur heard.  Pulses:       Carotid pulses are 2+ on the right side, and 2+ on the left side.  Pulmonary/Chest: Effort normal and breath sounds normal. No respiratory distress. He has no wheezes. He has no rales. He exhibits no tenderness.   Abdominal: Soft. Bowel sounds are normal. He exhibits no distension and no mass. There is no hepatosplenomegaly. There is no tenderness.   Musculoskeletal: He exhibits no edema or tenderness.   Feet:   Right Foot:   Skin Integrity: Negative for ulcer.   Left Foot:   Skin Integrity: Negative for ulcer.   Neurological: He is alert and oriented to person, place, and time. No cranial nerve deficit.   Skin: Skin is warm and dry. No rash noted. No erythema.   Psychiatric: He has a normal mood and affect. His speech is normal and behavior is normal.   Vitals reviewed.      DATA:   EKG: (personally reviewed tracing)  12/8/16 SB 44, o/w normal    Laboratory:  CBC:    Recent Labs  Lab 12/08/16  1319 12/13/16  0350   WHITE BLOOD CELL COUNT 10.28 8.16   HEMOGLOBIN 15.9 12.8 L "   HEMATOCRIT 46.8 38.5 L   PLATELETS 264 149 L       CHEMISTRIES:    Recent Labs  Lab 12/13/16  0349 12/14/16  0407 05/22/17  0611   GLUCOSE 100 104 132 H   SODIUM 140 137 139   POTASSIUM 3.4 L 3.8 4.0   BUN BLD 12 11 19   CREATININE 1.1 1.0 1.2   EGFR IF  >60 >60 >60   EGFR IF NON- >60 >60 >60   CALCIUM 8.0 L 8.9 9.7   MAGNESIUM  --  2.0  --        CARDIAC BIOMARKERS:        COAGS:        LIPIDS/LFTS:    Recent Labs  Lab 12/13/16  0349 04/17/17  0740   CHOLESTEROL 171 141   TRIGLYCERIDES 219 H 140   HDL 23 L 29 L   LDL CHOLESTEROL 104.2 84.0   NON-HDL CHOLESTEROL 148 112   AST 22 26   ALT 28 33       Cardiovascular Testing:  Ex MPI 1/10/17  The patient exercised for 5.5 minutes on a High Ramp protocol, corresponding to a functional capacity of 7 estimated METS, achieving a peak heart rate of 151 bpm, which is 97% of the age predicted maximum heart rate. -CP/EKG.  Nuclear Quantitative Functional Analysis:   LVEF: 49 %  Impression: NORMAL MYOCARDIAL PERFUSION  1. The perfusion scan is free of evidence for myocardial ischemia or injury.   2. There is a mild intensity fixed defect in the inferior wall of the left ventricle, secondary to diaphragm attenuation.   3. Resting wall motion is physiologic.   4. There is resting LV dysfunction with a reduced ejection fraction of 49 %.   5. The ventricular volumes are normal at rest and stress.   6. The extracardiac distribution of radioactivity is normal.   7. When compared to the previous study from 03/11/2014, no change    LE art US 1/9/17  1.  With regards to the right lower extremity there is no evidence of hemodynamically significant peripheral vascular disease. LASHAUN 1.07  2.  With regards to the left lower extremity there is no evidence of hemodynamically significant peripheral vascular disease. LASHAUN 1.08    Stress Test: L MPI 3/11/14  Nuclear Quantitative Functional Analysis:   LVEF: >= 70 %  Impression: NORMAL MYOCARDIAL PERFUSION  1. The  perfusion scan is free of evidence for myocardial ischemia or injury.   2. There is a moderate intensity fixed defect in the inferior wall of the left ventricle, secondary to diaphragm attenuation.   3. Resting wall motion is physiologic.   4. Resting LV function is normal.   5. The ventricular volumes are normal at rest and stress.   6. The extracardiac distribution of radioactivity is normal.     ASSESSMENT:   # AAA s/p endovasc repair 12/2016 ([facility])  # HTN, controlled  # CAD, hx of distant PCI, MPI 1/2017 normal  # HLP, LDL acceptable  # tob abuse, pt again strongly encouraged to quit.  He has previously been enrolled in the smoking class.  # ED    PLAN:   Cont med rx  Quit smoking  Follow up planned with Dr. Chahal re: AAA repair  Viagra prn for ED (pt not using NTG)  RTC 6 months    Gustavo Michael MD, FACC

## 2017-06-30 DIAGNOSIS — R91.1 SOLITARY PULMONARY NODULE: Primary | ICD-10-CM

## 2017-07-18 DIAGNOSIS — R91.1 PULMONARY NODULE: Primary | ICD-10-CM

## 2017-08-11 ENCOUNTER — TELEPHONE (OUTPATIENT)
Dept: SMOKING CESSATION | Facility: CLINIC | Age: 67
End: 2017-08-11

## 2017-08-14 ENCOUNTER — TELEPHONE (OUTPATIENT)
Dept: SMOKING CESSATION | Facility: CLINIC | Age: 67
End: 2017-08-14

## 2017-08-16 ENCOUNTER — CLINICAL SUPPORT (OUTPATIENT)
Dept: SMOKING CESSATION | Facility: CLINIC | Age: 67
End: 2017-08-16
Payer: COMMERCIAL

## 2017-08-16 DIAGNOSIS — F17.200 NICOTINE DEPENDENCE: Primary | ICD-10-CM

## 2017-08-16 PROCEDURE — 99407 BEHAV CHNG SMOKING > 10 MIN: CPT | Mod: S$GLB,,, | Performed by: INTERNAL MEDICINE

## 2017-09-25 DIAGNOSIS — M25.571 RIGHT ANKLE PAIN, UNSPECIFIED CHRONICITY: Primary | ICD-10-CM

## 2017-09-28 ENCOUNTER — HOSPITAL ENCOUNTER (OUTPATIENT)
Dept: RADIOLOGY | Facility: HOSPITAL | Age: 67
Discharge: HOME | End: 2017-09-28
Attending: ORTHOPAEDIC SURGERY
Payer: MEDICARE

## 2017-09-28 ENCOUNTER — OFFICE VISIT (OUTPATIENT)
Dept: ORTHOPEDICS | Facility: CLINIC | Age: 67
End: 2017-09-28
Payer: MEDICARE

## 2017-09-28 VITALS
WEIGHT: 189 LBS | BODY MASS INDEX: 29.66 KG/M2 | HEART RATE: 61 BPM | HEIGHT: 67 IN | DIASTOLIC BLOOD PRESSURE: 84 MMHG | SYSTOLIC BLOOD PRESSURE: 135 MMHG

## 2017-09-28 DIAGNOSIS — M25.571 RIGHT ANKLE PAIN, UNSPECIFIED CHRONICITY: ICD-10-CM

## 2017-09-28 DIAGNOSIS — M76.61 TENDONITIS, ACHILLES, RIGHT: ICD-10-CM

## 2017-09-28 DIAGNOSIS — M79.671 RIGHT FOOT PAIN: Primary | ICD-10-CM

## 2017-09-28 DIAGNOSIS — M25.571 ACUTE RIGHT ANKLE PAIN: Primary | ICD-10-CM

## 2017-09-28 DIAGNOSIS — M79.671 RIGHT FOOT PAIN: ICD-10-CM

## 2017-09-28 PROCEDURE — 73650 X-RAY EXAM OF HEEL: CPT | Mod: 26,RT,, | Performed by: RADIOLOGY

## 2017-09-28 PROCEDURE — 99999 PR PBB SHADOW E&M-EST. PATIENT-LVL III: CPT | Mod: PBBFAC,,, | Performed by: ORTHOPAEDIC SURGERY

## 2017-09-28 PROCEDURE — 73650 X-RAY EXAM OF HEEL: CPT | Mod: TC,PN,RT

## 2017-09-28 PROCEDURE — 99203 OFFICE O/P NEW LOW 30 MIN: CPT | Mod: 25,S$GLB,, | Performed by: ORTHOPAEDIC SURGERY

## 2017-09-28 PROCEDURE — 73610 X-RAY EXAM OF ANKLE: CPT | Mod: TC,PN,RT

## 2017-09-28 PROCEDURE — 73610 X-RAY EXAM OF ANKLE: CPT | Mod: 26,RT,, | Performed by: RADIOLOGY

## 2017-09-28 PROCEDURE — 97760 ORTHOTIC MGMT&TRAING 1ST ENC: CPT | Mod: GP,S$GLB,, | Performed by: ORTHOPAEDIC SURGERY

## 2017-10-03 PROBLEM — M76.61 TENDONITIS, ACHILLES, RIGHT: Status: ACTIVE | Noted: 2017-10-03

## 2017-10-03 NOTE — PROGRESS NOTES
"HPI: Antolin Richardson is a 66 y.o. male who complains of right ankle pain. The pain began about 1.5 months ago when he hit his heel on the pool. He rates his pain as 10/10 today. 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  The pain is worse with walking and standing. No previous tx.     PAST MEDICAL/SURGICAL/FAMILY/SOCIAL/ HISTORY: REVIEWED  +nicotine abuse    ALLERGIES/MEDICATIONS: REVIEWED       Review of Systems:     Constitution: Negative.   HEENT: Negative.   Eyes: Negative.   Cardiovascular: Negative.   Respiratory: Negative.   Endocrine: Negative.   Hematologic/Lymphatic: Negative.   Skin: Negative.   Musculoskeletal: Positive for right heel  pain   Gastrointestinal: Negative.   Genitourinary: Negative.   Neurological: Negative.   Psychiatric/Behavioral: Negative.   Allergic/Immunologic: Negative.       PHYSICAL EXAM:  Vitals:    09/28/17 1446   BP: 135/84   Pulse: 61     Ht Readings from Last 1 Encounters:   09/28/17 5' 7" (1.702 m)     Wt Readings from Last 1 Encounters:   09/28/17 85.7 kg (189 lb)         GENERAL: Well developed, well nourished, no acute distress.  SKIN: Skin is intact. No atrophy, abrasions or lesions are noted.   Neurological: Normal mental status. Appropriate and conversant. Alert and oriented x 3.  GAIT: Walks with antalgic gait.    Right lower extremity:  2+ dorsalis pedis pulse.  Capillary refill < 3 seconds.  Normal range of motion tibiotalar and subtalar joints. Normal alignment of the forefoot and the hindfoot.  5/5 strength EHL, FHL, tibialis anterior, gastrocsoleus, tibialis posterior and peroneals. Sensation to light touch intact sural, saphenous, superficial peroneal and deep peroneal nerves. No swelling, ecchymosis or deformity. No lymphadenopathy, no masses or tumors palpated.      Left lower extremity: 2+ dorsalis pedis pulse.  Capillary refill < 3 seconds.  Normal range of motion tibiotalar and subtalar joints. Normal alignment of the forefoot and the hindfoot.  5/5 " strength EHL, FHL, tibialis anterior, gastrocsoleus, tibialis posterior and peroneals. Sensation to light touch intact sural, saphenous, superficial peroneal and deep peroneal nerves. No swelling, ecchymosis or deformity. No lymphadenopathy, no masses or tumors palpated.        XRAYS:   3 views of right ankle obtained and reviewed today reveal small spur at insertion of achilles tendon. No evidence of fractures or dislocations.       ASSESSMENT:        Encounter Diagnosis   Name Primary?    Tendonitis, Achilles, right         PLAN:  I spent 15 minutes in consulation with the patient today. More than half the time was spent counseling the patient on his condition and the options for operative versus non-operative care.  PT for range of motion and strengthening of the right ankle. He does not want to stay off of work. Rest and elevate the affected painful area.  Apply cold compresses intermittently as needed.   We performed a custom orthotic/brace adjustment, fitting and training with the patient today. The patient demonstrated understanding and proper care. This was performed for 15 minutes.  Short cam boot  was given.  F/u 6 weeks, if no improvement will discuss surgical treatment in further detail or  refer the pt to Dr. Armenta for PRP vs Tenex.

## 2017-11-14 ENCOUNTER — DOCUMENTATION ONLY (OUTPATIENT)
Dept: FAMILY MEDICINE | Facility: CLINIC | Age: 67
End: 2017-11-14

## 2017-11-14 NOTE — PROGRESS NOTES
Pre-Visit Chart Review  For Appointment Scheduled on 11/17/17.    Health Maintenance Due   Topic Date Due    Hepatitis C Screening  1950    TETANUS VACCINE  11/27/1970    Pneumococcal PPSV23 (Medium Risk) (1) 11/27/1970    Colonoscopy  11/27/2002    Zoster Vaccine  11/27/2012    Influenza Vaccine  08/01/2017

## 2017-11-17 ENCOUNTER — OFFICE VISIT (OUTPATIENT)
Dept: FAMILY MEDICINE | Facility: CLINIC | Age: 67
End: 2017-11-17
Payer: MEDICARE

## 2017-11-17 VITALS
TEMPERATURE: 98 F | SYSTOLIC BLOOD PRESSURE: 128 MMHG | BODY MASS INDEX: 29.83 KG/M2 | HEART RATE: 49 BPM | HEIGHT: 67 IN | DIASTOLIC BLOOD PRESSURE: 62 MMHG | WEIGHT: 190.06 LBS

## 2017-11-17 DIAGNOSIS — R91.8 PULMONARY NODULES: ICD-10-CM

## 2017-11-17 DIAGNOSIS — E78.5 HYPERLIPIDEMIA, UNSPECIFIED HYPERLIPIDEMIA TYPE: ICD-10-CM

## 2017-11-17 DIAGNOSIS — Z23 IMMUNIZATION DUE: ICD-10-CM

## 2017-11-17 DIAGNOSIS — I71.40 ABDOMINAL AORTIC ANEURYSM (AAA) WITHOUT RUPTURE: ICD-10-CM

## 2017-11-17 DIAGNOSIS — I25.10 CORONARY ARTERY DISEASE INVOLVING NATIVE CORONARY ARTERY OF NATIVE HEART WITHOUT ANGINA PECTORIS: ICD-10-CM

## 2017-11-17 DIAGNOSIS — K63.5 POLYP OF COLON, UNSPECIFIED PART OF COLON, UNSPECIFIED TYPE: ICD-10-CM

## 2017-11-17 DIAGNOSIS — I10 HYPERTENSION, ESSENTIAL: Primary | ICD-10-CM

## 2017-11-17 DIAGNOSIS — Z11.59 NEED FOR HEPATITIS C SCREENING TEST: ICD-10-CM

## 2017-11-17 PROCEDURE — G0009 ADMIN PNEUMOCOCCAL VACCINE: HCPCS | Mod: S$GLB,,, | Performed by: FAMILY MEDICINE

## 2017-11-17 PROCEDURE — G0008 ADMIN INFLUENZA VIRUS VAC: HCPCS | Mod: S$GLB,,, | Performed by: FAMILY MEDICINE

## 2017-11-17 PROCEDURE — 99999 PR PBB SHADOW E&M-EST. PATIENT-LVL IV: CPT | Mod: PBBFAC,,, | Performed by: FAMILY MEDICINE

## 2017-11-17 PROCEDURE — 90732 PPSV23 VACC 2 YRS+ SUBQ/IM: CPT | Mod: S$GLB,,, | Performed by: FAMILY MEDICINE

## 2017-11-17 PROCEDURE — 99204 OFFICE O/P NEW MOD 45 MIN: CPT | Mod: 25,S$GLB,, | Performed by: FAMILY MEDICINE

## 2017-11-17 PROCEDURE — 90686 IIV4 VACC NO PRSV 0.5 ML IM: CPT | Mod: S$GLB,,, | Performed by: FAMILY MEDICINE

## 2017-11-17 NOTE — PROGRESS NOTES
CHIEF COMPLAINT:  Establish care      HISTORY OF PRESENT ILLNESS:  Antolin Richardson is a 66 y.o. male who presents to clinic as a new patient to establish care. He is a poor historian. He has CAD, hyperlipidemia, HTN, and underwent AAA repair and has not followed up with his cardiologist or his vascular surgeon since his surgery.  He requests to be referred to [facility] in [place]. He has a history of pulmonary nodules and did not follow up for a chest CT.  He is due for lab work including hepatitis C screening.  He had 2 prevnar 13s and is due for a pneumovax and influenza vaccine. He states that he had a colonoscopy at [facility] 2 years ago and states that it was recommended he repeat this in 3 years due to colon polyps.       REVIEW OF SYSTEMS:  The patient denies any fever, chills, night sweats, headaches, vision changes, difficulty speaking or swallowing, decreased hearing, weight loss, weight gain, chest pain, palpitations, shortness of breath, cough, nausea, vomiting, abdominal pain, dysuria, diarrhea, constipation, hematuria, hematochezia, melena, changes in his hair, skin, nails, numbness or weakness in his extremities, erythema, swelling or pain over any of his joints, myalgia, swollen glands, easy bruising, fatigue, edema. He denies any scrotal/testicular masses, penile discharge. He denies any symptoms of anxiety or depression.      MEDICATIONS:   Reviewed and/or reconciled in EPIC    ALLERGIES:  Reviewed and/or reconciled in [facility]    PAST MEDICAL/SURGICAL HISTORY:   Past Medical History:   Diagnosis Date    AAA (abdominal aortic aneurysm) 12/2016    s/p endo repair (Fela)    Acid reflux     Anxiety     Coronary artery disease     Hypertension       Past Surgical History:   Procedure Laterality Date    ABDOMINAL AORTIC ANEURYSM REPAIR      APPENDECTOMY      CORONARY ANGIOPLASTY  2009    coronary artery stent      x1       FAMILY HISTORY:    Family History   Problem  "Relation Age of Onset    Cancer Mother      brain    Dementia Father      alzheimer's    Hypertension Father        SOCIAL HISTORY:    Social History     Social History    Marital status:      Spouse name: N/A    Number of children: N/A    Years of education: N/A     Occupational History    Not on file.     Social History Main Topics    Smoking status: Current Every Day Smoker     Packs/day: 2.00     Years: 20.00    Smokeless tobacco: Never Used    Alcohol use No    Drug use: No    Sexual activity: Yes     Partners: Female     Other Topics Concern    Not on file     Social History Narrative    No narrative on file       PHYSICAL EXAM:  VITAL SIGNS:   Vitals:    11/17/17 0915   BP: (!) 149/87   BP Location: Left arm   Patient Position: Sitting   BP Method: Small (Automatic)   Pulse: (!) 49   Temp: 97.6 °F (36.4 °C)   TempSrc: Oral   Weight: 86.2 kg (190 lb 0.6 oz)   Height: 5' 7" (1.702 m)     GENERAL:  Patient appears well nourished, sitting on exam table, in no acute distress.  HEENT:  Atraumatic, normocephalic, PERRLA, EOMI, no conjunctival injection, sclerae are anicteric, normal external auditory canals,TMs clear b/l, gross hearing intact to whisper, MMM, no oropharygneal erythema or exudate.  NECK:  Supple, normal ROM, trachea is midline , no supraclavicular or cervical LAD or masses palpated.  Thyroid gland not palpable.  CARDIOVASCULAR:  RRR, normal S1 and S2, no m/r/g.  RESPIRATORY:  CTA b/l, no wheezes, rhonchi, rales.  No increased work of breathing, no  use of accessory muscles.  ABDOMEN:  Soft, nontender, nondistended, normoactive bowel sounds in all four quadrants, no rebound or guarding, no HSM or masses palpated.  Normal percussion.  EXTREMITIES:  2+ DP pulses b/l, no edema.  SKIN:  Warm, no lesions on exposed skin.  NEUROMUSCULAR:  Cranial nerves II-XII grossly intact.   No clubbing or cyanosis of digits/nails.  Steady gait.  PSYCH:  Patient is alert and oriented to person, time, " place. They are appropriately dressed and groomed. There is normal eye contact. Rate and tone of speech is normal. Normal insight, judgement. Normal thought content and process.     LABORATORY/IMAGING STUDIES: pending    ASSESSMENT/PLAN: This is a 66 y.o. male who presents to clinic for evaluation of the following concerns  1. Hypertension, essential  See below    2. Hyperlipidemia, unspecified hyperlipidemia type  - Comprehensive metabolic panel; Future  - Lipid panel; Future  - Ambulatory referral to Cardiology    3. Coronary artery disease involving native coronary artery of native heart without angina pectoris  - Comprehensive metabolic panel; Future  - Lipid panel; Future  - Ambulatory referral to Cardiology    4. Abdominal aortic aneurysm (AAA) without rupture  Patient instructed to make a follow up appointment with Dr. Chahal    5. Immunization due  - Pneumococcal Polysaccharide Vaccine (23 Valent) (SQ/IM)  - Influenza - Quadrivalent (3 years & older) (PF)    6. Pulmonary nodules  - CT Chest Without Contrast; Future    7. Need for hepatitis C screening test  - Hepatitis C antibody; Future    8. Polyp of colon, unspecified part of colon, unspecified type  Will request colonoscopy report from [facility]      Patient readiness: acceptance and barriers:none    During the course of the visit the patient was educated and counseled about the following:     Hypertension:   Medication: no change.    Goals: Hypertension: Reduce Blood Pressure    Did patient meet goals/outcomes: Yes    The following self management tools provided: declined    Patient Instructions (the written plan) was given to the patient/family.     Time spent with patient: 30 minutes        Olive Goss MD

## 2017-11-20 ENCOUNTER — HOSPITAL ENCOUNTER (OUTPATIENT)
Dept: RADIOLOGY | Facility: HOSPITAL | Age: 67
Discharge: HOME | End: 2017-11-20
Attending: SURGERY
Payer: MEDICARE

## 2017-11-20 ENCOUNTER — HOSPITAL ENCOUNTER (OUTPATIENT)
Dept: RADIOLOGY | Facility: HOSPITAL | Age: 67
Discharge: HOME | End: 2017-11-20
Attending: FAMILY MEDICINE
Payer: MEDICARE

## 2017-11-20 DIAGNOSIS — I71.40 ABDOMINAL AORTIC ANEURYSM (AAA) WITHOUT RUPTURE: ICD-10-CM

## 2017-11-20 DIAGNOSIS — R91.8 PULMONARY NODULES: ICD-10-CM

## 2017-11-20 PROCEDURE — 71250 CT THORAX DX C-: CPT | Mod: 26,,, | Performed by: RADIOLOGY

## 2017-11-20 PROCEDURE — 74174 CTA ABD&PLVS W/CONTRAST: CPT | Mod: 26,,, | Performed by: RADIOLOGY

## 2017-11-20 PROCEDURE — 74174 CTA ABD&PLVS W/CONTRAST: CPT | Mod: TC

## 2017-11-20 PROCEDURE — 25500020 PHARM REV CODE 255

## 2017-11-20 PROCEDURE — 71250 CT THORAX DX C-: CPT | Mod: TC

## 2017-11-20 RX ADMIN — IOHEXOL 100 ML: 350 INJECTION, SOLUTION INTRAVENOUS at 09:11

## 2017-12-07 ENCOUNTER — TELEPHONE (OUTPATIENT)
Dept: CARDIOLOGY | Facility: CLINIC | Age: 67
End: 2017-12-07

## 2017-12-07 NOTE — TELEPHONE ENCOUNTER
Called and spoke with Kasandra Antolin, apologized for him being questioned as to why he was coming to see us. He stated that he he has moved to the [facility] and is moving his care to the [facility] also. He also stated that he needed to reschedule his appt with Dr. Bahena due to going out of town. Offered him an appt for 01/22/2018 @ . He accepted. No further issues noted.

## 2017-12-07 NOTE — TELEPHONE ENCOUNTER
Called pt to discuss consult appt, as Pt is already established with Dr Michael. No answer. Left message for pt to call back with more information.

## 2017-12-07 NOTE — TELEPHONE ENCOUNTER
----- Message from Sd Hernandez sent at 12/7/2017  1:13 PM CST -----  Contact: same  FYI--Patient called in and stated he got a message from someone wanting to know why he was changing physicians and patient stated he was living on the [facility] in [facility] and has moved to the [facility].

## 2017-12-11 ENCOUNTER — TELEPHONE (OUTPATIENT)
Dept: FAMILY MEDICINE | Facility: CLINIC | Age: 67
End: 2017-12-11

## 2017-12-12 NOTE — TELEPHONE ENCOUNTER
Chest ct demonstrates stable aneurysm, emphysema, stable left kidney cyst, fatty liver disease. . Needs to eat low fat diet, work on weight loss to help prevent progression of fatty liver disease.

## 2017-12-13 ENCOUNTER — OFFICE VISIT (OUTPATIENT)
Dept: FAMILY MEDICINE | Facility: CLINIC | Age: 67
End: 2017-12-13
Payer: MEDICARE

## 2017-12-13 VITALS
SYSTOLIC BLOOD PRESSURE: 130 MMHG | WEIGHT: 188.25 LBS | RESPIRATION RATE: 16 BRPM | BODY MASS INDEX: 29.55 KG/M2 | HEIGHT: 67 IN | DIASTOLIC BLOOD PRESSURE: 88 MMHG | HEART RATE: 59 BPM | TEMPERATURE: 98 F

## 2017-12-13 DIAGNOSIS — M95.4 STERNAL DEFORMITY: Primary | ICD-10-CM

## 2017-12-13 DIAGNOSIS — R51.9 OCCIPITAL HEADACHE: ICD-10-CM

## 2017-12-13 DIAGNOSIS — R22.2 CHEST WALL MASS: ICD-10-CM

## 2017-12-13 DIAGNOSIS — G44.209 MUSCLE TENSION HEADACHE: ICD-10-CM

## 2017-12-13 PROCEDURE — 99213 OFFICE O/P EST LOW 20 MIN: CPT | Mod: S$GLB,,, | Performed by: PHYSICIAN ASSISTANT

## 2017-12-13 PROCEDURE — 99999 PR PBB SHADOW E&M-EST. PATIENT-LVL IV: CPT | Mod: PBBFAC,,, | Performed by: PHYSICIAN ASSISTANT

## 2017-12-13 RX ORDER — TIZANIDINE 4 MG/1
4 TABLET ORAL NIGHTLY PRN
Qty: 10 TABLET | Refills: 0 | Status: SHIPPED | OUTPATIENT
Start: 2017-12-13 | End: 2018-02-26 | Stop reason: SDUPTHER

## 2017-12-14 NOTE — PROGRESS NOTES
"Subjective:       Patient ID: Antolin Richardson is a 67 y.o. male.    Chief Complaint: Knot on chest    Mr. Richardson comes to clinic today concerned about a lump on his chest that has been present for over a year. He reports that he "sneezed then it popped out."  The patient reports this area is tender to touch. The patient also admits to pain in his neck and behind his head. This is worse at the end of the day. It improves when he is in a hot shower and takes advil. The patient does continue to smoke. He reports that he and his girlfriend are interested in quitting; the hope to do this in the new year.       Review of Systems   Constitutional: Negative for activity change, appetite change and fever.   HENT: Negative for postnasal drip, rhinorrhea and sinus pressure.    Eyes: Negative for visual disturbance.   Respiratory: Negative for cough and shortness of breath.    Cardiovascular: Negative for chest pain.   Gastrointestinal: Negative for abdominal distention and abdominal pain.   Genitourinary: Negative for difficulty urinating and dysuria.   Musculoskeletal: Positive for arthralgias, myalgias and neck pain.   Neurological: Negative for headaches.   Hematological: Negative for adenopathy.   Psychiatric/Behavioral: The patient is not nervous/anxious.        Objective:      Physical Exam   Constitutional: He is oriented to person, place, and time.   Neck:   Posterior cervical spine TTP  Pain in cervical spine with flexion and extension of the cervical spine   Cardiovascular: Normal rate and regular rhythm.    Pulmonary/Chest: Effort normal and breath sounds normal. He has no wheezes.       Abdominal: Soft. Bowel sounds are normal. He exhibits no distension. There is no tenderness.   Musculoskeletal: He exhibits no edema.   Neurological: He is alert and oriented to person, place, and time.   Skin: No erythema.   Psychiatric: His behavior is normal.       Assessment:       1. Sternal deformity    2. Occipital headache  "   3. Muscle tension headache    4. Chest wall mass        Plan:   Antolin was seen today for knot on chest.    Diagnoses and all orders for this visit:    Sternal deformity  -     X-Ray Sternum; Future    Occipital headache  -     X-Ray Cervical Spine Complete 5 view; Future    Muscle tension headache  -     X-Ray Cervical Spine Complete 5 view; Future  -     tiZANidine (ZANAFLEX) 4 MG tablet; Take 1 tablet (4 mg total) by mouth nightly as needed.    Chest wall mass  -     US Soft Tissue Misc; Future

## 2017-12-15 ENCOUNTER — TELEPHONE (OUTPATIENT)
Dept: FAMILY MEDICINE | Facility: CLINIC | Age: 67
End: 2017-12-15

## 2017-12-15 NOTE — TELEPHONE ENCOUNTER
Patient notified co-pay for upcoming scheduled x-rays and ultrasound is $0.  Patient verbalized understanding.

## 2017-12-15 NOTE — TELEPHONE ENCOUNTER
----- Message from Farheen Pickett sent at 12/14/2017  9:27 AM CST -----  Contact: Patient  Antolin, patient [phone] calling to see what the co-pay for his two x-ray's and ultrasound will be 12/18/17. Spoke with Aarti at pre-service and she told me to send a message to office for listed co-pays. Please advise. Thanks.

## 2017-12-18 ENCOUNTER — HOSPITAL ENCOUNTER (OUTPATIENT)
Dept: RADIOLOGY | Facility: CLINIC | Age: 67
Discharge: HOME | End: 2017-12-18
Attending: PHYSICIAN ASSISTANT
Payer: MEDICARE

## 2017-12-18 DIAGNOSIS — G44.209 MUSCLE TENSION HEADACHE: ICD-10-CM

## 2017-12-18 DIAGNOSIS — R22.2 CHEST WALL MASS: ICD-10-CM

## 2017-12-18 DIAGNOSIS — R51.9 OCCIPITAL HEADACHE: ICD-10-CM

## 2017-12-18 DIAGNOSIS — M95.4 STERNAL DEFORMITY: ICD-10-CM

## 2017-12-18 PROCEDURE — 76604 US EXAM CHEST: CPT | Mod: TC,PO

## 2017-12-18 PROCEDURE — 71120 X-RAY EXAM BREASTBONE 2/>VWS: CPT | Mod: TC,PO

## 2017-12-18 PROCEDURE — 72050 X-RAY EXAM NECK SPINE 4/5VWS: CPT | Mod: 26,,, | Performed by: RADIOLOGY

## 2017-12-18 PROCEDURE — 76604 US EXAM CHEST: CPT | Mod: 26,,, | Performed by: RADIOLOGY

## 2017-12-18 PROCEDURE — 71120 X-RAY EXAM BREASTBONE 2/>VWS: CPT | Mod: 26,,, | Performed by: RADIOLOGY

## 2017-12-18 PROCEDURE — 72050 X-RAY EXAM NECK SPINE 4/5VWS: CPT | Mod: TC,PO

## 2017-12-20 ENCOUNTER — TELEPHONE (OUTPATIENT)
Dept: FAMILY MEDICINE | Facility: CLINIC | Age: 67
End: 2017-12-20

## 2017-12-20 NOTE — TELEPHONE ENCOUNTER
----- Message from Tabitha Leavitt PA-C sent at 12/18/2017  2:48 PM CST -----  Ultrasound shows no abnormality present.

## 2017-12-20 NOTE — TELEPHONE ENCOUNTER
----- Message from Tabitha Leavitt PA-C sent at 12/18/2017  1:18 PM CST -----  Xray of sternum is normal.

## 2018-01-11 ENCOUNTER — CLINICAL SUPPORT (OUTPATIENT)
Dept: SMOKING CESSATION | Facility: CLINIC | Age: 68
End: 2018-01-11
Payer: COMMERCIAL

## 2018-01-11 ENCOUNTER — TELEPHONE (OUTPATIENT)
Dept: SMOKING CESSATION | Facility: CLINIC | Age: 68
End: 2018-01-11

## 2018-01-11 DIAGNOSIS — F17.200 NICOTINE DEPENDENCE: Primary | ICD-10-CM

## 2018-01-11 PROCEDURE — 99406 BEHAV CHNG SMOKING 3-10 MIN: CPT | Mod: S$GLB,,,

## 2018-01-22 DIAGNOSIS — I10 HYPERTENSION, UNSPECIFIED TYPE: Primary | ICD-10-CM

## 2018-02-26 DIAGNOSIS — G44.209 MUSCLE TENSION HEADACHE: ICD-10-CM

## 2018-02-26 RX ORDER — TIZANIDINE 4 MG/1
4 TABLET ORAL NIGHTLY PRN
Qty: 10 TABLET | Refills: 0 | Status: SHIPPED | OUTPATIENT
Start: 2018-02-26 | End: 2018-03-08

## 2018-02-27 ENCOUNTER — LAB VISIT (OUTPATIENT)
Dept: LAB | Facility: HOSPITAL | Age: 68
End: 2018-02-27
Attending: UROLOGY
Payer: MEDICARE

## 2018-02-27 ENCOUNTER — OFFICE VISIT (OUTPATIENT)
Dept: UROLOGY | Facility: CLINIC | Age: 68
End: 2018-02-27
Payer: MEDICARE

## 2018-02-27 VITALS
WEIGHT: 186.63 LBS | TEMPERATURE: 98 F | DIASTOLIC BLOOD PRESSURE: 92 MMHG | HEIGHT: 67 IN | SYSTOLIC BLOOD PRESSURE: 158 MMHG | HEART RATE: 51 BPM | BODY MASS INDEX: 29.29 KG/M2

## 2018-02-27 DIAGNOSIS — N40.1 BENIGN LOCALIZED PROSTATIC HYPERPLASIA WITH LOWER URINARY TRACT SYMPTOMS (LUTS): ICD-10-CM

## 2018-02-27 DIAGNOSIS — R31.0 GROSS HEMATURIA: ICD-10-CM

## 2018-02-27 DIAGNOSIS — N40.0 BPH (BENIGN PROSTATIC HYPERPLASIA): ICD-10-CM

## 2018-02-27 DIAGNOSIS — N40.0 BENIGN PROSTATIC HYPERPLASIA, UNSPECIFIED WHETHER LOWER URINARY TRACT SYMPTOMS PRESENT: Primary | ICD-10-CM

## 2018-02-27 DIAGNOSIS — N40.0 BENIGN PROSTATIC HYPERPLASIA, UNSPECIFIED WHETHER LOWER URINARY TRACT SYMPTOMS PRESENT: ICD-10-CM

## 2018-02-27 LAB
BILIRUB SERPL-MCNC: ABNORMAL MG/DL
BLOOD URINE, POC: ABNORMAL
COLOR, POC UA: ABNORMAL
GLUCOSE UR QL STRIP: ABNORMAL
KETONES UR QL STRIP: ABNORMAL
LEUKOCYTE ESTERASE URINE, POC: ABNORMAL
NITRITE, POC UA: ABNORMAL
PH, POC UA: 5
PROSTATE SPECIFIC ANTIGEN, TOTAL: 3.7 NG/ML
PROTEIN, POC: ABNORMAL
PSA FREE MFR SERPL: 31.35 %
PSA FREE SERPL-MCNC: 1.16 NG/ML
SPECIFIC GRAVITY, POC UA: 1015
UROBILINOGEN, POC UA: ABNORMAL

## 2018-02-27 PROCEDURE — 81002 URINALYSIS NONAUTO W/O SCOPE: CPT | Mod: S$GLB,,, | Performed by: UROLOGY

## 2018-02-27 PROCEDURE — 99204 OFFICE O/P NEW MOD 45 MIN: CPT | Mod: 25,S$GLB,, | Performed by: UROLOGY

## 2018-02-27 PROCEDURE — 84154 ASSAY OF PSA FREE: CPT

## 2018-02-27 PROCEDURE — 88112 CYTOPATH CELL ENHANCE TECH: CPT | Performed by: PATHOLOGY

## 2018-02-27 PROCEDURE — 99999 PR PBB SHADOW E&M-EST. PATIENT-LVL V: CPT | Mod: PBBFAC,,, | Performed by: UROLOGY

## 2018-02-27 PROCEDURE — 3008F BODY MASS INDEX DOCD: CPT | Mod: S$GLB,,, | Performed by: UROLOGY

## 2018-02-27 PROCEDURE — 87086 URINE CULTURE/COLONY COUNT: CPT

## 2018-02-27 PROCEDURE — 36415 COLL VENOUS BLD VENIPUNCTURE: CPT

## 2018-02-27 RX ORDER — TAMSULOSIN HYDROCHLORIDE 0.4 MG/1
0.4 CAPSULE ORAL
Qty: 30 CAPSULE | Refills: 0 | Status: SHIPPED | OUTPATIENT
Start: 2018-02-27 | End: 2018-03-26 | Stop reason: SDUPTHER

## 2018-02-27 NOTE — PATIENT INSTRUCTIONS
BPH with LuTS  -start flomax 0.4mg nightly  -cysto trus to check prostate enlargement and for median lobe  -avoid  ALL caffeine, hold off on oab meds until he cuts out caffeine and we r/o stricture  -uroflow and trus nurse visit   -check psa (free and total) and may need biopsy, if needs biopsy would need clearance to be off asa with recent AAA repair ()    Gross hematuria  -already had a cta in 11/2017  -urine for culture  (no sx, frequency not new   -urine cytology (h/o smoking)  -cysto trus     The natural history of prostate cancer and ongoing controversy regarding screening and potential treatment outcomes of prostate cancer has been discussed with the patient. The meaning of a false positive PSA and a false negative PSA has been discussed. He indicates understanding of the limitations of this screening test and wishes  to proceed with screening PSA testing.      F/u for cysto trus soon

## 2018-02-27 NOTE — PROGRESS NOTES
[facility] - [place]  Staff: MD Trixie    Referring provider and please cc: self  [facility]     Chief Complaint: gross hematuria and frequency    Subjective:        HPI: Antolin Richardson is a 67 y.o. male presents with     OAB with BPH  -frequency q20 to 30 minutes. Has never been on meds for prostate, denies any hesistancy.   -sx have occurred since surgery (AAA repair and had catheter during this surgery) in November however CTA shows a very enlarged prostate. 6x5cm in transverse with large intravesical lobe.  -drinks 5 cups of coffee in the morning and 2 cups of tea in the pm  -has a weak stream and sometimes has sprayed stream  -AUA ssx: 26, unhappy (3 incomplete emptying, 3 frequency, 5 intermittency, 5 urgency, 5 weak stream, 3 straining, 2 sleeping). QOL: 2    Gross hematuria  -he's had blood in his urine twice since yesterday. Saw clots.   -cta 11/2017 shows no stones. +tobacco use (1.5 ppd x 50 years). On asa 81mg daily. Circumcised. Denies any new frequency that is new or worse, denies any dysuria. No h/o gross hematuria before.     UA today: 1.015/5/1+leuk and tr blood - send for culture and cytology   UCx: none        ED  -occ problems with erections.   -uses viagra  -IIEF: 8    ECOG Status: 0    STDs in past: No  Vasectomy: no    REVIEW OF SYSTEMS:  General ROS: no fevers, no chills  Psychological ROS: no depression  Endocrine ROS: no heat or cold  Respiratory ROS: no SOB  Cardiovascular ROS: no CP  Gastrointestinal ROS: no abdominal pain, no constipation, no diarrhea, no BRBPR  Musculoskeletal ROS: no muscle pain  Neurological ROS: no headaches  Dermatological ROS: no rashes  HEENT: noglasses, no sinus   ROS: per HPI     PMHx:  Past Medical History:   Diagnosis Date    AAA (abdominal aortic aneurysm) 12/2016    s/p endo repair (Fela)    Acid reflux     Anxiety     Coronary artery disease     Hypertension      Kidney stones:  No  Cataracts? none    PSHx:  Past Surgical History:   Procedure Laterality Date    ABDOMINAL AORTIC ANEURYSM REPAIR      APPENDECTOMY      CORONARY ANGIOPLASTY  2009    coronary artery stent      x1     Stents/Valves/Foreign Bodies: Yes - last placed in   Cardiac Evaluation: Yes -     Screening Studies  Colonoscopy: 2017 found polyps    Fam Hx:   malignancies: No  . Gyn malignancies: no. Parents  and 80s and mother had brain cancer  kidney stones: No     Soc Hx:  +tobacco.  1.5 pk per day x 50 year  occ alcohol  Lives in [place]  :   Children: 3  Occupation:retired from security    Allergies:  Lisinopril    Medications: reviewed   Anticoagulation: Yes - asa 81mg daily     Objective:     Vitals:    18 1108   BP: (!) 158/92   Pulse: (!) 51   Temp: 97.6 °F (36.4 °C)         General:WDWN in NAD  Eyes: PERRLA, normal conjunctiva  Respiratory: No increased work on breathing.   Cardiovascular: No obvious extremity edema. Warm and well perfused.   GI: palpation of masses. No tenderness. No hepatosplenomegaly to palpation.  Musculoskeletal: normal range of motion of bilateral upper extremities. Normal muscle strength and tone.  Skin: no obvious rashes or lesions. No tightening of skin noted.  Neurologic: CN grossly normal. Normal sensation.   Psychiatric: awake, alert and oriented x 3. Mood and affect normal. Cooperative.    :  Inspection of anus normal  No scrotal rashes, cysts or lesions  Epididymis normal in size, no tenderness  Testes normal and size, equal size bilaterally, no masses  Urethral meatus normal without discharge  Penis is circumcised,    MONE: 40g gland without masses, tenderness. SV not palpable. Normal sphincter tone. No hemhorroids.  No bilateral inguinal hernias noted           LABS REVIEW:      Cr:   Lab Results   Component Value Date    CREATININE 1.3 2017    CREATININE 1.3 2017       PSA:   No results found for: PSA  Testosterone: No results  found for: TESTOSTERONE    PATHOLOGY REVIEW:  none    RADIOGRAPHIC REVIEW:  cta 11/20/17 images reviewed by me  -no stones  -enlarged prostate    Interval placement of aortobiiliac endovascular stent graft which is patent with no extravasation and with partial collapse of the native surrounding aneurysm sac.    Additional findings as detailed above including enlarged prostate gland, diverticulosis without CT findings of acute diverticulitis, left renal masses suggesting low density and high density cysts.    Assessment:       1. Benign prostatic hyperplasia, unspecified whether lower urinary tract symptoms present    2. Benign localized prostatic hyperplasia with lower urinary tract symptoms (LUTS)    3. Gross hematuria          Plan:     BPH with LuTS  -start flomax 0.4mg nightly  -cysto trus to check prostate enlargement and for median lobe  -avoid  ALL caffeine, hold off on oab meds until he cuts out caffeine and we r/o stricture  -uroflow and trus nurse visit   -check psa (free and total) and may need biopsy, if needs biopsy would need clearance to be off asa with recent AAA repair ()    Gross hematuria  -already had a cta in 11/2017  -urine for culture  (no sx, frequency not new   -urine cytology (h/o smoking)  -cysto trus     The natural history of prostate cancer and ongoing controversy regarding screening and potential treatment outcomes of prostate cancer has been discussed with the patient. The meaning of a false positive PSA and a false negative PSA has been discussed. He indicates understanding of the limitations of this screening test and wishes  to proceed with screening PSA testing.      F/u for cysto trus soon    Poly Marcum MD

## 2018-02-28 ENCOUNTER — TELEPHONE (OUTPATIENT)
Dept: UROLOGY | Facility: CLINIC | Age: 68
End: 2018-02-28

## 2018-02-28 RX ORDER — CIPROFLOXACIN 500 MG/1
500 TABLET ORAL 2 TIMES DAILY
Qty: 3 TABLET | Refills: 0 | Status: SHIPPED | OUTPATIENT
Start: 2018-02-28 | End: 2018-03-02

## 2018-03-01 ENCOUNTER — TELEPHONE (OUTPATIENT)
Dept: UROLOGY | Facility: CLINIC | Age: 68
End: 2018-03-01

## 2018-03-01 DIAGNOSIS — R97.20 ELEVATED PSA: ICD-10-CM

## 2018-03-01 DIAGNOSIS — R31.0 GROSS HEMATURIA: Primary | ICD-10-CM

## 2018-03-01 LAB
BACTERIA UR CULT: NORMAL
BACTERIA UR CULT: NORMAL

## 2018-03-01 RX ORDER — SULFAMETHOXAZOLE AND TRIMETHOPRIM 800; 160 MG/1; MG/1
1 TABLET ORAL 2 TIMES DAILY
Qty: 14 TABLET | Refills: 0 | Status: SHIPPED | OUTPATIENT
Start: 2018-03-01 | End: 2018-03-08

## 2018-03-01 NOTE — TELEPHONE ENCOUNTER
Spoke with patient informed him of results and recommendations. Patient verbally voiced understanding. Informed patient will give him a call with the next step once urine culture results return

## 2018-03-01 NOTE — TELEPHONE ENCOUNTER
I tried to call the pt twice yesterday with the following information but no answer:    Please let pt know his psa is elevated  We need to confirm the culture shows no infection  If the culture does show infection then we would treat his infection and repeat the psa bc psa can be elevated from infection or cancer.   If the culture does not show infection then I recommend a biopsy to r/o cancer. The psa could be elevated from cancer or enlargement but the only way to rule out cancer would be to do a biopsy.    Please see if he is amenable to this. Culture should return tomorrow   We would do this at the surgery center and he could receive some relaxing medication if he would like    If we do the biopsy:  1. He will also need to take cipro the night before, morning of and night of the procedure  2. 2 fleets enemas morning of procedure  3. Ok to stay on asa 81mg (make sure it's not 325mg) and make sure he's not on any other blood thinenrs  4. Call the [facility] and let them know to add biopsy  5. I will review the risks of procedure when I see him for the procedure.   6. He cannot take the tizanadine and cipro at the same time

## 2018-03-01 NOTE — TELEPHONE ENCOUNTER
Please let pt know his culture did show multiple organisms, usually a contaminant. However I would like to go ahead and treat him with bactrim twice a day for 7 days and repeat a urinalysis and culture as well as psa free and total in 2 weeks.  If psa comes down will only do a cystoscopy and transrectal ultrasound. But if psa remains elevated will do biopsy  Go ahead and move his case to 3 weeks from now- call [facility]. That is the latest we will push it.  Of note his cytology did not show cancer cells.

## 2018-03-02 NOTE — TELEPHONE ENCOUNTER
Spoke with patient informed him and his wife of results and recommendations. Verbally voiced understanding. Will come in on 3/19 for urine and psa and have procedure on 3/26

## 2018-03-14 DIAGNOSIS — R82.998 CELLS AND CASTS IN URINE: Primary | ICD-10-CM

## 2018-03-19 ENCOUNTER — CLINICAL SUPPORT (OUTPATIENT)
Dept: UROLOGY | Facility: CLINIC | Age: 68
End: 2018-03-19
Payer: MEDICARE

## 2018-03-19 ENCOUNTER — LAB VISIT (OUTPATIENT)
Dept: LAB | Facility: HOSPITAL | Age: 68
End: 2018-03-19
Attending: UROLOGY
Payer: MEDICARE

## 2018-03-19 DIAGNOSIS — R97.20 ELEVATED PSA: ICD-10-CM

## 2018-03-19 DIAGNOSIS — R82.998 CELLS AND CASTS IN URINE: ICD-10-CM

## 2018-03-19 LAB
BACTERIA #/AREA URNS HPF: NORMAL /HPF
BILIRUB UR QL STRIP: NEGATIVE
CLARITY UR: CLEAR
COLOR UR: YELLOW
GLUCOSE UR QL STRIP: NEGATIVE
HGB UR QL STRIP: ABNORMAL
HYALINE CASTS #/AREA URNS LPF: 1 /LPF
KETONES UR QL STRIP: NEGATIVE
LEUKOCYTE ESTERASE UR QL STRIP: NEGATIVE
MICROSCOPIC COMMENT: NORMAL
NITRITE UR QL STRIP: NEGATIVE
PH UR STRIP: 5 [PH] (ref 5–8)
PROSTATE SPECIFIC ANTIGEN, TOTAL: 3 NG/ML
PROT UR QL STRIP: NEGATIVE
PSA FREE MFR SERPL: 29.67 %
PSA FREE SERPL-MCNC: 0.89 NG/ML
RBC #/AREA URNS HPF: 1 /HPF (ref 0–4)
SP GR UR STRIP: 1.02 (ref 1–1.03)
SQUAMOUS #/AREA URNS HPF: 0 /HPF
URN SPEC COLLECT METH UR: ABNORMAL
UROBILINOGEN UR STRIP-ACNC: NEGATIVE EU/DL
WBC #/AREA URNS HPF: 1 /HPF (ref 0–5)

## 2018-03-19 PROCEDURE — 84154 ASSAY OF PSA FREE: CPT

## 2018-03-19 PROCEDURE — 36415 COLL VENOUS BLD VENIPUNCTURE: CPT

## 2018-03-19 PROCEDURE — 81000 URINALYSIS NONAUTO W/SCOPE: CPT

## 2018-03-19 PROCEDURE — 99499 UNLISTED E&M SERVICE: CPT | Mod: S$GLB,,, | Performed by: UROLOGY

## 2018-03-19 PROCEDURE — 87086 URINE CULTURE/COLONY COUNT: CPT

## 2018-03-19 PROCEDURE — 99999 PR PBB SHADOW E&M-EST. PATIENT-LVL I: CPT | Mod: PBBFAC,,,

## 2018-03-20 LAB — BACTERIA UR CULT: NO GROWTH

## 2018-03-26 ENCOUNTER — TELEPHONE (OUTPATIENT)
Dept: UROLOGY | Facility: CLINIC | Age: 68
End: 2018-03-26

## 2018-03-26 ENCOUNTER — LAB VISIT (OUTPATIENT)
Dept: LAB | Facility: HOSPITAL | Age: 68
End: 2018-03-26
Attending: UROLOGY
Payer: MEDICARE

## 2018-03-26 ENCOUNTER — CLINICAL SUPPORT (OUTPATIENT)
Dept: UROLOGY | Facility: CLINIC | Age: 68
End: 2018-03-26
Attending: UROLOGY
Payer: MEDICARE

## 2018-03-26 DIAGNOSIS — N40.0 BPH (BENIGN PROSTATIC HYPERPLASIA): ICD-10-CM

## 2018-03-26 DIAGNOSIS — N40.0 BENIGN PROSTATIC HYPERPLASIA, UNSPECIFIED WHETHER LOWER URINARY TRACT SYMPTOMS PRESENT: Primary | ICD-10-CM

## 2018-03-26 DIAGNOSIS — R31.0 GROSS HEMATURIA: Primary | ICD-10-CM

## 2018-03-26 PROCEDURE — 99499 UNLISTED E&M SERVICE: CPT | Mod: S$GLB,,, | Performed by: UROLOGY

## 2018-03-26 PROCEDURE — 87088 URINE BACTERIA CULTURE: CPT

## 2018-03-26 PROCEDURE — 87086 URINE CULTURE/COLONY COUNT: CPT

## 2018-03-26 RX ORDER — TAMSULOSIN HYDROCHLORIDE 0.4 MG/1
0.4 CAPSULE ORAL
Qty: 90 CAPSULE | Refills: 3 | Status: ON HOLD | OUTPATIENT
Start: 2018-03-26 | End: 2018-03-26

## 2018-03-26 RX ORDER — SULFAMETHOXAZOLE AND TRIMETHOPRIM 800; 160 MG/1; MG/1
1 TABLET ORAL 2 TIMES DAILY
Qty: 20 TABLET | Refills: 0 | Status: SHIPPED | OUTPATIENT
Start: 2018-03-26 | End: 2018-03-27 | Stop reason: SDUPTHER

## 2018-03-26 NOTE — PROGRESS NOTES
Per  patient in clinic for uroflow and pvr.  Uroflow results (date: 03/26/2018) on  : Peak flow: 9 mL/s, Mean flow: 6mL/s, Voided time: 11s, Flow time: 11s, TTP flow: 3s, Voided volume: 70 mL, Pattern of curve: bell. Pvr: 17

## 2018-03-27 RX ORDER — SULFAMETHOXAZOLE AND TRIMETHOPRIM 800; 160 MG/1; MG/1
1 TABLET ORAL 2 TIMES DAILY
Qty: 20 TABLET | Refills: 0 | Status: SHIPPED | OUTPATIENT
Start: 2018-03-27 | End: 2018-04-06

## 2018-03-27 NOTE — TELEPHONE ENCOUNTER
"Please let pt know that I would like to do his procedure next Monday (april 2)  instead of April 9. I want to do a cystoscopy, possible bladder biopsy, transrectal ultrasound and biopsy. I tried to call pt but no answer.     He had milky urine today and leuk and blood in his urine, urine appeared milky. He had no symptoms. He initially presented about a month ago with gross hematuria and elevated psa.    His uroflow today showed he emptied his bladder mostly and I was concerned he had a possible recurrent uti due to elevated residual but this does not appear to be the case. Although his cytology was negative he could have malignancy in his bladder and I do not want to push off his procedure anymore unless I am treating him with inappropriate abx.    I will go ahead and start him on bactrim twice a day starting today for 10 days (he already had cipro for 10 days a few weeks ago). Please make sure he has not taken bactrim before. If so I may give him another abx.    His culture will return wed and we will know if he has been on the correct abx if it isn't "multiple organisms" again. If it is he will  Need to come in Friday and drop off a urine in the lab for another urinalysis and culture. Lab should be opened Friday. I will place that order once we get the results of the culture. But just let him know he may have to come back Friday to recheck urine.    He can start bactrim Tuesday and stay on ASA 81mg daily (h/o AAA)  He will get gent preop (80mg IM - I wrote orders)  Make sure pt knows about the fleets enemas    "

## 2018-03-27 NOTE — TELEPHONE ENCOUNTER
----- Message from Ирина Taylor sent at 3/27/2018 10:23 AM CDT -----  Patient is calling concerning his prescription sulfamethoxazole-trimethoprim 800-160mg (BACTRIM DS) 800-160 mg Tab. He says that they have a different prescription at the pharmacy that he does not know the name of, that he would like to discuss with office. He states that he needs to get this today. Please call back at [phone].    [facility]/pharmacy #[id number] - [place]  [address]  [place] [id number]  Phone: [phone] Fax: [phone]

## 2018-03-27 NOTE — TELEPHONE ENCOUNTER
Spoke with patient he states [facility] [place] and needs to be [facility] [place]

## 2018-03-28 ENCOUNTER — TELEPHONE (OUTPATIENT)
Dept: UROLOGY | Facility: CLINIC | Age: 68
End: 2018-03-28

## 2018-03-28 DIAGNOSIS — R82.998 CELLS AND CASTS IN URINE: Primary | ICD-10-CM

## 2018-03-28 LAB — BACTERIA UR CULT: NORMAL

## 2018-03-28 NOTE — TELEPHONE ENCOUNTER
----- Message from Jailene Weinstein sent at 3/28/2018  8:07 AM CDT -----  Contact: self  Patient was told he needed a urine sample today but he is not sure if he was to bring it in or give it there. Patient does not have a order. Please call patient at [phone]. Thanks!

## 2018-03-28 NOTE — TELEPHONE ENCOUNTER
Spoke with patient informed him per  he should go to hospital lab on Friday to give a urine sample. Patient verbally voiced understanding.

## 2018-03-29 RX ORDER — GENTAMICIN SULFATE 80 MG/100ML
80 INJECTION, SOLUTION INTRAVENOUS
Status: DISCONTINUED | OUTPATIENT
Start: 2018-03-29 | End: 2018-03-29

## 2018-03-31 ENCOUNTER — LAB VISIT (OUTPATIENT)
Dept: LAB | Facility: HOSPITAL | Age: 68
End: 2018-03-31
Attending: UROLOGY
Payer: MEDICARE

## 2018-03-31 DIAGNOSIS — R82.998 CELLS AND CASTS IN URINE: ICD-10-CM

## 2018-03-31 LAB
BILIRUB UR QL STRIP: NEGATIVE
CLARITY UR: CLEAR
COLOR UR: YELLOW
GLUCOSE UR QL STRIP: NEGATIVE
HGB UR QL STRIP: NEGATIVE
KETONES UR QL STRIP: NEGATIVE
LEUKOCYTE ESTERASE UR QL STRIP: NEGATIVE
NITRITE UR QL STRIP: NEGATIVE
PH UR STRIP: 6 [PH] (ref 5–8)
PROT UR QL STRIP: NEGATIVE
SP GR UR STRIP: 1.02 (ref 1–1.03)
URN SPEC COLLECT METH UR: NORMAL
UROBILINOGEN UR STRIP-ACNC: NEGATIVE EU/DL

## 2018-03-31 PROCEDURE — 81003 URINALYSIS AUTO W/O SCOPE: CPT

## 2018-03-31 PROCEDURE — 87086 URINE CULTURE/COLONY COUNT: CPT

## 2018-04-02 ENCOUNTER — HOSPITAL ENCOUNTER (OUTPATIENT)
Facility: AMBULARY SURGERY CENTER | Age: 68
Discharge: HOME | End: 2018-04-02
Attending: UROLOGY | Admitting: UROLOGY
Payer: MEDICARE

## 2018-04-02 ENCOUNTER — SURGERY (OUTPATIENT)
Age: 68
End: 2018-04-02

## 2018-04-02 DIAGNOSIS — N40.0 BPH (BENIGN PROSTATIC HYPERPLASIA): ICD-10-CM

## 2018-04-02 DIAGNOSIS — R31.0 GROSS HEMATURIA: ICD-10-CM

## 2018-04-02 LAB
BACTERIA UR CULT: NORMAL
BILIRUB SERPL-MCNC: NORMAL MG/DL
BLOOD URINE, POC: NORMAL
COLOR, POC UA: NORMAL
GLUCOSE UR QL STRIP: NORMAL
KETONES UR QL STRIP: NORMAL
LEUKOCYTE ESTERASE URINE, POC: NORMAL
NITRITE, POC UA: NORMAL
PH, POC UA: 5
PROTEIN, POC: NORMAL
SPECIFIC GRAVITY, POC UA: 1.02
UROBILINOGEN, POC UA: NORMAL

## 2018-04-02 PROCEDURE — 52000 CYSTOURETHROSCOPY: CPT | Mod: ,,, | Performed by: UROLOGY

## 2018-04-02 PROCEDURE — 76872 US TRANSRECTAL: CPT | Mod: 26,,, | Performed by: UROLOGY

## 2018-04-02 PROCEDURE — 52000 CYSTOURETHROSCOPY: CPT | Performed by: UROLOGY

## 2018-04-02 PROCEDURE — 76872 US TRANSRECTAL: CPT | Performed by: UROLOGY

## 2018-04-02 RX ORDER — GENTAMICIN SULFATE 40 MG/ML
80 INJECTION, SOLUTION INTRAMUSCULAR; INTRAVENOUS ONCE
Status: COMPLETED | OUTPATIENT
Start: 2018-04-02 | End: 2018-04-02

## 2018-04-02 RX ORDER — TAMSULOSIN HYDROCHLORIDE 0.4 MG/1
0.8 CAPSULE ORAL
Qty: 180 CAPSULE | Status: SHIPPED | OUTPATIENT
Start: 2018-04-02 | End: 2018-09-25 | Stop reason: ALTCHOICE

## 2018-04-02 RX ORDER — LIDOCAINE HYDROCHLORIDE 20 MG/ML
JELLY TOPICAL
Status: DISCONTINUED
Start: 2018-04-02 | End: 2018-04-02 | Stop reason: HOSPADM

## 2018-04-02 RX ORDER — LIDOCAINE HYDROCHLORIDE 10 MG/ML
INJECTION, SOLUTION EPIDURAL; INFILTRATION; INTRACAUDAL; PERINEURAL
Status: DISCONTINUED
Start: 2018-04-02 | End: 2018-04-02 | Stop reason: HOSPADM

## 2018-04-02 RX ORDER — LIDOCAINE HYDROCHLORIDE 20 MG/ML
JELLY TOPICAL
Status: DISCONTINUED | OUTPATIENT
Start: 2018-04-02 | End: 2018-04-02 | Stop reason: HOSPADM

## 2018-04-02 RX ORDER — WATER 1000 ML/1000ML
INJECTION, SOLUTION INTRAVENOUS
Status: DISCONTINUED | OUTPATIENT
Start: 2018-04-02 | End: 2018-04-02 | Stop reason: HOSPADM

## 2018-04-02 RX ORDER — FINASTERIDE 5 MG/1
5 TABLET, FILM COATED ORAL DAILY
Qty: 90 TABLET | Refills: 3 | Status: SHIPPED | OUTPATIENT
Start: 2018-04-02 | End: 2018-09-25 | Stop reason: ALTCHOICE

## 2018-04-02 RX ADMIN — GENTAMICIN SULFATE 80 MG: 40 INJECTION, SOLUTION INTRAMUSCULAR; INTRAVENOUS at 03:04

## 2018-04-02 RX ADMIN — LIDOCAINE HYDROCHLORIDE 5 ML: 20 JELLY TOPICAL at 03:04

## 2018-04-02 RX ADMIN — WATER 500 ML: 1000 INJECTION, SOLUTION INTRAVENOUS at 03:04

## 2018-04-02 RX ADMIN — LIDOCAINE HYDROCHLORIDE 10 ML: 20 JELLY TOPICAL at 03:04

## 2018-04-02 NOTE — DISCHARGE INSTRUCTIONS
After the procedure    · Drink plenty of fluids.  · You may have burning or light bleeding when you urinate--this is normal.  · Medications may be prescribed to ease any discomfort or prevent infection. Take these as directed.  · Call your doctor if you have heavy bleeding or blood clots, burning that lasts more than a day, a fever over 100°F  (38° C), or trouble urinating.                 Transrectal Ultrasound and Biopsy  A transrectal ultrasound is an imaging test. It uses sound waves to create pictures of a mans prostate gland. Your prostate gland is in front of your rectum. For this test, a special probe (transducer) is placed directly into your rectum. During the test, tissue samples (a biopsy) may also be taken. The test is done by a specially trained technologist (a sonographer).    Before leaving, you may need to wait for a short time while the images are reviewed. In most cases, you can go back to your normal routine after the test. If you had a biopsy, you may see some blood in your urine, sperm, or stool for a day or so. This is normal. Your health care provider will let you know when your test results are ready.  In some cases, a diagnosis cant be made from the tissue sample that was taken. If this happens, your provider will talk with you about whether you may need another biopsy. Or you may need a different procedure.       © 0952-6684 [employer]. [address] [place] [address], [place] [address]. All rights reserved. This information is not intended as a substitute for professional medical care. Always follow your healthcare professional's instructions.

## 2018-04-02 NOTE — H&P
[facility] H&P Note - [place]  Staff: MD Trixie     Referring provider and please cc: self  PCP: Dr[facility]: [facility]      Chief Complaint: gross hematuria and frequency     Subjective:        HPI: Antolin Richardson is a 67 y.o. male presents with      OAB with BPH  -frequency q20 to 30 minutes. Has never been on meds for prostate, denies any hesistancy.   -sx have occurred since surgery (AAA repair and had catheter during this surgery) in November however CTA shows a very enlarged prostate. 6x5cm in transverse with large intravesical lobe.  -drinks 5 cups of coffee in the morning and 2 cups of tea in the pm  -has a weak stream and sometimes has sprayed stream  -AUA ssx: 26, unhappy (3 incomplete emptying, 3 frequency, 5 intermittency, 5 urgency, 5 weak stream, 3 straining, 2 sleeping). QOL: 2  -Uroflow results (date: 03/26/2018) on  flomax : Peak flow: 9 mL/s, Mean flow: 6mL/s, Voided time: 11s, Flow time: 11s, TTP flow: 3s, Voided volume: 70 mL, Pattern of curve: bell. Pvr: 17    Here today for cysto trus biopsy     Elevated psa  He has not previous psa to compare.     PSA:   3/19/18 3.0, %free 29.67 (no uti)  2/27/18 3.7, %31.5 (possible uti, treated with bactrim ds po bid x 7d)    Gross hematuria  -he's had blood in his urine twice since yesterday. Saw clots.   -cta 11/2017 shows no stones. +tobacco use (1.5 ppd x 50 years). On asa 81mg daily. Circumcised. Denies any new frequency that is new or worse, denies any dysuria. No h/o gross hematuria before.   -urine cytology 2/27/18: negative     UA today: 1.015/5/1+leuk and tr blood - send for culture and cytology   UCx:   3/31/18 Ng, void: negative  3/26/18 Coag neg staph, void: 2+ wbc, nit+, 50 blood - bactrim twice a day for 10 days  3/19/18 Ng, void: 1+ blooda  2/27/18 Multiple org, void: 1+wbc, tr blood started on bactrim twice a day for 7 days     Here today for cysto      ED  -occ problems with erections.   -uses  viagra  -IIEF: 8     ECOG Status: 0     STDs in past: No  Vasectomy: no     REVIEW OF SYSTEMS:  General ROS: no fevers, no chills  Psychological ROS: no depression  Endocrine ROS: no heat or cold  Respiratory ROS: no SOB  Cardiovascular ROS: no CP  Gastrointestinal ROS: no abdominal pain, no constipation, no diarrhea, no BRBPR  Musculoskeletal ROS: no muscle pain  Neurological ROS: no headaches  Dermatological ROS: no rashes  HEENT: noglasses, no sinus   ROS: per HPI     PMHx:       Past Medical History:   Diagnosis Date    AAA (abdominal aortic aneurysm) 2016     s/p endo repair ([place])    Acid reflux      Anxiety      Coronary artery disease      Hypertension        Kidney stones: No  Cataracts? none     PSHx:        Past Surgical History:   Procedure Laterality Date    ABDOMINAL AORTIC ANEURYSM REPAIR        APPENDECTOMY        CORONARY ANGIOPLASTY       coronary artery stent         x1      Stents/Valves/Foreign Bodies: Yes - last placed in   Cardiac Evaluation: Yes -      Screening Studies  Colonoscopy:  found polyps     Fam Hx:   malignancies: No  . Gyn malignancies: no. Parents  and 80s and mother had brain cancer  kidney stones: No      Soc Hx:  +tobacco.  1.5 pk per day x 50 year  occ alcohol  Lives in [place]  :   Children: 3  Occupation:retired from security     Allergies:  Lisinopril     Medications: reviewed   Anticoagulation: Yes - asa 81mg daily      Objective:      Vitals:    18 1513   BP: (!) 144/81   Pulse: (!) 51   Resp: 19   Temp: 97.7 °F (36.5 °C)          General:WDWN in NAD  Eyes: PERRLA, normal conjunctiva  Respiratory: No increased work on breathing.   Cardiovascular: No obvious extremity edema. Warm and well perfused.   GI: palpation of masses. No tenderness. No hepatosplenomegaly to palpation.  Musculoskeletal: normal range of motion of bilateral upper extremities. Normal muscle strength and tone.  Skin: no obvious rashes or  lesions. No tightening of skin noted.  Neurologic: CN grossly normal. Normal sensation.   Psychiatric: awake, alert and oriented x 3. Mood and affect normal. Cooperative.      2/27/18:  Inspection of anus normal  No scrotal rashes, cysts or lesions  Epididymis normal in size, no tenderness  Testes normal and size, equal size bilaterally, no masses  Urethral meatus normal without discharge  Penis is circumcised,    MONE: 40g gland without masses, tenderness. SV not palpable. Normal sphincter tone. No hemhorroids.  No bilateral inguinal hernias noted               LABS REVIEW:        Cr:         Lab Results   Component Value Date     CREATININE 1.3 11/20/2017     CREATININE 1.3 11/20/2017             PATHOLOGY REVIEW:  Urine 2/27/17: Negative for malignant cells.     RADIOGRAPHIC REVIEW:  cta 11/20/17 images reviewed by me  -no stones  -enlarged prostate     Interval placement of aortobiiliac endovascular stent graft which is patent with no extravasation and with partial collapse of the native surrounding aneurysm sac.    Additional findings as detailed above including enlarged prostate gland, diverticulosis without CT findings of acute diverticulitis, left renal masses suggesting low density and high density cysts.     Assessment:       1. Benign prostatic hyperplasia, unspecified whether lower urinary tract symptoms present    2. Benign localized prostatic hyperplasia with lower urinary tract symptoms (LUTS)    3. Gross hematuria           Plan:      BPH with LuTS  -continue flomax 0.4mg nightly  -cysto trus to check prostate enlargement and for median lobe today   -avoid  ALL caffeine, hold off on oab meds until he cuts out caffeine and we r/o stricture  -uroflow and trus nurse visit again  -discussed elevated psa and biopsy. Went over risks of bsa. psa on low end of high and possibly related to utis.      Gross hematuria  -already had a cta in 11/2017  -urine for culture  (no sx, frequency not new ). Never grew  bacteria that could be isolated but definitely had uti asymptomatic)  -urine cytology (h/o smoking) negative  -cysto trus        F/u for cysto trus today    This patient has been cleared for surgery in ambulatory surgical facility.

## 2018-04-02 NOTE — PLAN OF CARE
"Pt states ready to go home , stable,  Ambulatory. Po flds offered pt refused. Pt c/o pain "6/10",burning upon voiding. Pt and SO instructed on importance of drinking lots flds today understanding verbalized.Ambulated to car /home  "

## 2018-04-02 NOTE — OP NOTE
[facility] Procedure Note  Date: 2018      TRANSRECTAL PROSTATIC ULTRASOUND and Cystoscopy    Staff surgeon: Trixie  Date: 2018  Anesthesia: lidocaine jelly  EBL: minimal  Complications: none    NAME:Antolin Richardson  : 1950  Diagnosis:BPH  Current PSA: 3.0 (infection 2 weeks prior)- decided not to proceed with biospy    Indications: Antolin Richardson is a 67 y.o. male with bph and recurrent uti    FLEXIBLE CYSTOSCOPY    Anesthesia: 2% uro-jet lidocaine jelly for local analgesia    Flexible cysto-urethroscopy was performed after consent was obtained.  The risks and benefits were explained.    2% lidocaine urojet was used for local analgesia.  The genitalia was prepped and draped in the sterile fashion with betadine.    The flexible scope was advanced into the urethra and into the bladder.  Bilateral ureteral orifice were evaluated and noted to be normal with clear efflux.  The bladder was completely surveyed in a systematic fashion.   No bladder tumors or lesions were seen.  No strictures were noted.  The prostate showed Significant hypertrophy.  There was Significant median lobe present.     TRANSRECTAL ULTRASOUND  Measurements:   Height:  51.6 mm  Width:  57.2 mm  Length: 64.9  mm  Volume: 99g cm3  Intravesical protrusion: 7mm  PSAD: 0.03    Urinalysis: negative    Seminal vesicles/Ejaculatory Ducts: Normal  Outline/Symmetry of Prostate: WNL  Central Gland/Transition Zone: Well demarcated  Peripheral Zone: WNL  Bladder: Normal  MedianLobe: Normal    The patient tolerated the procedures well without complication.    Findings: (pictures were uploaded into media)  trus volume 99g  Large circumferential intravesical lobe  b lobe hypertrophy   No tumors seen in bladder  Grade 1 trabeculations    A) Pt has a 100g prostate and recurrent uti's. Currently on flomax 0.4mg nightly. Recommend treatment for bph.     Plan:  Explained to pt his psa elevation likely due to size, prostatitis and  recent infection. Doing a biopsy would increase his risk of sepsis. Did discuss possibly finding prostate cancer in specimen chips down the road.  We discussed staged turp or robot prostatectomy and pt more interested in  Robot prostatectomy.   Restart aspirin   Increase flomax to 2 pills at night  Start finasteride 5mg daily to shrink prostate  Return with a full bladder for a uroflow and pvr nurse visit - jethro will schedule  Follow-up with  in about a month or at next available. They will call you with this appt. 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         Poly Marcum MD

## 2018-04-02 NOTE — DISCHARGE SUMMARY
[facility]  Discharge Note  Short Stay    Admit Date: 4/2/2018    Discharge Date and Time: No discharge date for patient encounter.     Attending Physician: Poly Marcum,*     Discharge Provider: Poly Marcum    Diagnoses:  Active Hospital Problems    Diagnosis  POA    BPH (benign prostatic hyperplasia) [N40.0]  Yes      Resolved Hospital Problems    Diagnosis Date Resolved POA   No resolved problems to display.       Discharged Condition: good    Hospital Course: Patient was admitted for an outpatient procedure and tolerated the procedure well with no complications.    Final Diagnoses: Same as principal problem.    Disposition: Home or Self Care    Follow up/Patient Instructions:    Medications:  Reconciled Home Medications:      Medication List      START taking these medications    finasteride 5 mg tablet  Commonly known as:  PROSCAR  Take 1 tablet (5 mg total) by mouth once daily.        CONTINUE taking these medications    amLODIPine 10 MG tablet  Commonly known as:  NORVASC  Take 1 tablet (10 mg total) by mouth once daily.     aspirin 81 MG EC tablet  Commonly known as:  ECOTRIN  Take 1 tablet (81 mg total) by mouth once daily.     atorvastatin 40 MG tablet  Commonly known as:  LIPITOR  Take 1 tablet (40 mg total) by mouth every evening.     metoprolol succinate 100 MG 24 hr tablet  Commonly known as:  TOPROL-XL  Take 1 tablet (100 mg total) by mouth once daily.     sildenafil 100 MG tablet  Commonly known as:  VIAGRA  Take 1 tablet (100 mg total) by mouth daily as needed for Erectile Dysfunction.     sulfamethoxazole-trimethoprim 800-160mg 800-160 mg Tab  Commonly known as:  BACTRIM DS  Take 1 tablet by mouth 2 (two) times daily.     tamsulosin 0.4 mg Cp24  Commonly known as:  FLOMAX  Take 2 capsules (0.8 mg total) by mouth after dinner.           Where to Get Your Medications      These medications were sent to [facility]/pharmacy #[id number] - [place], [place] - [address]  [address]  [place] [id number]    Hours:  24-hours Phone:  [phone]   · finasteride 5 mg tablet     You can get these medications from any pharmacy    Bring a paper prescription for each of these medications  · tamsulosin 0.4 mg Cp24       No discharge procedures on file.      Discharge Procedure Orders (must include Diet, Follow-up, Activity):  No discharge procedures on file.

## 2018-04-03 VITALS
WEIGHT: 186 LBS | RESPIRATION RATE: 20 BRPM | OXYGEN SATURATION: 98 % | TEMPERATURE: 98 F | HEART RATE: 50 BPM | DIASTOLIC BLOOD PRESSURE: 86 MMHG | SYSTOLIC BLOOD PRESSURE: 166 MMHG | HEIGHT: 67 IN | BODY MASS INDEX: 29.19 KG/M2

## 2018-04-26 RX ORDER — ATORVASTATIN CALCIUM 40 MG/1
40 TABLET, FILM COATED ORAL NIGHTLY
Qty: 90 TABLET | Refills: 3 | Status: SHIPPED | OUTPATIENT
Start: 2018-04-26 | End: 2018-09-25 | Stop reason: SDUPTHER

## 2018-04-26 RX ORDER — METOPROLOL SUCCINATE 100 MG/1
100 TABLET, EXTENDED RELEASE ORAL DAILY
Qty: 90 TABLET | Refills: 3 | Status: SHIPPED | OUTPATIENT
Start: 2018-04-26 | End: 2018-08-09 | Stop reason: SDUPTHER

## 2018-04-30 ENCOUNTER — TELEPHONE (OUTPATIENT)
Dept: UROLOGY | Facility: CLINIC | Age: 68
End: 2018-04-30

## 2018-04-30 NOTE — TELEPHONE ENCOUNTER
----- Message from Gustavo Palm MD sent at 4/28/2018  6:20 PM CDT -----  Please find a spot for him sometime mid to late may to discuss surgical options for bph   ----- Message -----  From: Poly Marcum MD  Sent: 4/2/2018   3:51 PM  To: Gustavo Palm MD    Hey this irma has a 100g prostate and recurrent uti's  Possibly interested in robot simple prostate  Told him to f/u with you, expect a 1 month f/u

## 2018-05-01 NOTE — TELEPHONE ENCOUNTER
Spoke with patient, patient stated there is an issue with his insurance [employer], and he has been receiving bills. Patient stated he was told this should be resolved by October. Patient wanting to pursue appt when issue with PHN is resolved. Patient stated he would call in October when he can schedule. We both verbalized understanding. And informed patient I would advise Dr. Palm.

## 2018-05-23 ENCOUNTER — DOCUMENTATION ONLY (OUTPATIENT)
Dept: FAMILY MEDICINE | Facility: CLINIC | Age: 68
End: 2018-05-23

## 2018-05-23 NOTE — PROGRESS NOTES
Pre-Visit Chart Review  For Appointment Scheduled on 5/31/18.    Health Maintenance Due   Topic Date Due    TETANUS VACCINE  11/27/1970    Colonoscopy  11/27/1970    Abdominal Aortic Aneurysm Screening  11/27/2017

## 2018-05-29 DIAGNOSIS — Z12.11 COLON CANCER SCREENING: Primary | ICD-10-CM

## 2018-06-18 ENCOUNTER — TELEPHONE (OUTPATIENT)
Dept: UROLOGY | Facility: CLINIC | Age: 68
End: 2018-06-18

## 2018-06-18 NOTE — TELEPHONE ENCOUNTER
Spoke with patient's wife appointment scheduled for Thursday 6/21 at 10:15am. Verbally voiced understanding.

## 2018-06-18 NOTE — TELEPHONE ENCOUNTER
----- Message from Tashia Hernandez sent at 6/18/2018 11:46 AM CDT -----  Contact: Pts wife  Pt is experiencing blood in his urine.  He would like to be seen this week if possible.  Please call pt at [phone]

## 2018-06-21 ENCOUNTER — APPOINTMENT (OUTPATIENT)
Dept: LAB | Facility: HOSPITAL | Age: 68
End: 2018-06-21
Attending: UROLOGY
Payer: MEDICARE

## 2018-06-21 ENCOUNTER — TELEPHONE (OUTPATIENT)
Dept: UROLOGY | Facility: CLINIC | Age: 68
End: 2018-06-21

## 2018-06-21 ENCOUNTER — OFFICE VISIT (OUTPATIENT)
Dept: UROLOGY | Facility: CLINIC | Age: 68
End: 2018-06-21
Payer: MEDICARE

## 2018-06-21 VITALS
WEIGHT: 186.31 LBS | SYSTOLIC BLOOD PRESSURE: 182 MMHG | HEIGHT: 67 IN | HEART RATE: 63 BPM | BODY MASS INDEX: 29.24 KG/M2 | DIASTOLIC BLOOD PRESSURE: 99 MMHG | RESPIRATION RATE: 18 BRPM

## 2018-06-21 DIAGNOSIS — R31.9 HEMATURIA, UNSPECIFIED TYPE: Primary | ICD-10-CM

## 2018-06-21 DIAGNOSIS — N40.0 BENIGN PROSTATIC HYPERPLASIA, UNSPECIFIED WHETHER LOWER URINARY TRACT SYMPTOMS PRESENT: ICD-10-CM

## 2018-06-21 LAB
BACTERIA #/AREA URNS HPF: ABNORMAL /HPF
BILIRUB SERPL-MCNC: ABNORMAL MG/DL
BILIRUB UR QL STRIP: NEGATIVE
BLOOD URINE, POC: ABNORMAL
CLARITY UR: CLEAR
COLOR UR: YELLOW
COLOR, POC UA: CLEAR
GLUCOSE UR QL STRIP: ABNORMAL
GLUCOSE UR QL STRIP: NEGATIVE
HGB UR QL STRIP: ABNORMAL
KETONES UR QL STRIP: ABNORMAL
KETONES UR QL STRIP: NEGATIVE
LEUKOCYTE ESTERASE UR QL STRIP: ABNORMAL
LEUKOCYTE ESTERASE URINE, POC: ABNORMAL
MICROSCOPIC COMMENT: ABNORMAL
NITRITE UR QL STRIP: POSITIVE
NITRITE, POC UA: ABNORMAL
PH UR STRIP: 6 [PH] (ref 5–8)
PH, POC UA: 8
PROT UR QL STRIP: ABNORMAL
PROTEIN, POC: 30
RBC #/AREA URNS HPF: >100 /HPF (ref 0–4)
SP GR UR STRIP: 1.01 (ref 1–1.03)
SPECIFIC GRAVITY, POC UA: 1.01
SQUAMOUS #/AREA URNS HPF: 3 /HPF
URN SPEC COLLECT METH UR: ABNORMAL
UROBILINOGEN UR STRIP-ACNC: NEGATIVE EU/DL
UROBILINOGEN, POC UA: ABNORMAL
WBC #/AREA URNS HPF: 15 /HPF (ref 0–5)

## 2018-06-21 PROCEDURE — 88112 CYTOPATH CELL ENHANCE TECH: CPT | Mod: 26,,, | Performed by: PATHOLOGY

## 2018-06-21 PROCEDURE — 87088 URINE BACTERIA CULTURE: CPT

## 2018-06-21 PROCEDURE — 51701 INSERT BLADDER CATHETER: CPT | Mod: PBBFAC,PN | Performed by: UROLOGY

## 2018-06-21 PROCEDURE — 99999 PR PBB SHADOW E&M-EST. PATIENT-LVL V: CPT | Mod: PBBFAC,,, | Performed by: UROLOGY

## 2018-06-21 PROCEDURE — 88112 CYTOPATH CELL ENHANCE TECH: CPT | Performed by: PATHOLOGY

## 2018-06-21 PROCEDURE — 87086 URINE CULTURE/COLONY COUNT: CPT

## 2018-06-21 PROCEDURE — 99215 OFFICE O/P EST HI 40 MIN: CPT | Mod: PBBFAC,PN | Performed by: UROLOGY

## 2018-06-21 PROCEDURE — 81002 URINALYSIS NONAUTO W/O SCOPE: CPT | Mod: PBBFAC,PN | Performed by: UROLOGY

## 2018-06-21 PROCEDURE — 81000 URINALYSIS NONAUTO W/SCOPE: CPT

## 2018-06-21 PROCEDURE — 99215 OFFICE O/P EST HI 40 MIN: CPT | Mod: S$PBB,,, | Performed by: UROLOGY

## 2018-06-21 NOTE — Clinical Note
Cytology, culture ua from cath urine today  Cbc, bmp, rbus prior to surgery (kristal will call to schedule) Turp scheduled for July 18 Cath urine sample (depending on today's sample) 12 days prior Even if culture negative, I want him on abx for 7 days prior to procedure discontiue aspirin 7 days prior to procedure

## 2018-06-21 NOTE — PATIENT INSTRUCTIONS
Cytology, culture ua from cath urine today   Cbc, bmp, rbus prior to surgery (kristal will call to schedule)  Turp scheduled for July 18  Stay overnight one night and catheter for a few days  Cath urine sample (depending on today's sample) 12 days prior  Even if culture negative, I want him on abx for 7 days prior to procedure  discontiue aspirin 7 days prior to procedure  Continue flomax and finasteride and plan will be discontinue this one month after procedure  May need a staged turp  Could find cancer, could have infection

## 2018-06-21 NOTE — TELEPHONE ENCOUNTER
He needs clearance from  to be off asa for 7 days prior to a turp on July 18. He had a recent AAA repair.   Please let pt know too.

## 2018-06-22 NOTE — TELEPHONE ENCOUNTER
----- Message from Robina Martinez sent at 6/22/2018  9:47 AM CDT -----  Contact: Self  Call placed to POD     Type:  Patient Returning Call    Who Called:  Patient   Who Left Message for Patient:  Nurse   Does the patient know what this is regarding?:    Best Call Back Number:  [phone] (home)     Additional Information:  Returning a call

## 2018-06-23 ENCOUNTER — TELEPHONE (OUTPATIENT)
Dept: UROLOGY | Facility: CLINIC | Age: 68
End: 2018-06-23

## 2018-06-23 LAB — BACTERIA UR CULT: NORMAL

## 2018-06-23 RX ORDER — DOXYCYCLINE 100 MG/1
100 CAPSULE ORAL 2 TIMES DAILY
Qty: 56 CAPSULE | Refills: 0 | Status: SHIPPED | OUTPATIENT
Start: 2018-06-23 | End: 2018-06-25 | Stop reason: SDUPTHER

## 2018-06-24 NOTE — TELEPHONE ENCOUNTER
Please let pt know that he has coag neg staph in his urine.  I would like for him to take doxycycline 100mg twice a day starting 3 weeks prior to his procedure and remain on it for a total of 4 weeks  1 week prior to procedure on July 18 want to make sure his culture shows no infection    He needs to stay out of the sun while he is on doxycycline       He needs to remain on finasteride and flomax until 1 month after procedure

## 2018-06-25 ENCOUNTER — TELEPHONE (OUTPATIENT)
Dept: UROLOGY | Facility: CLINIC | Age: 68
End: 2018-06-25

## 2018-06-25 RX ORDER — DOXYCYCLINE 100 MG/1
100 CAPSULE ORAL 2 TIMES DAILY
Qty: 56 CAPSULE | Refills: 0 | Status: SHIPPED | OUTPATIENT
Start: 2018-06-25 | End: 2018-07-23

## 2018-06-25 NOTE — TELEPHONE ENCOUNTER
----- Message from Soledad Arguelles sent at 6/25/2018  2:18 PM CDT -----  Contact: wife Gopal  Patient wife call in regards to medication that was called in(wife unsure of name of medication) Patient wife needing to speak with nurse     [phone] please call to advise and question     To send to [facility] on [address] in [place]

## 2018-06-25 NOTE — TELEPHONE ENCOUNTER
Spoke with patient requesting for prescription for doxycycline to be called in to [facility] pharmacy

## 2018-06-25 NOTE — TELEPHONE ENCOUNTER
Spoke with patient informed him of results and recommendations. Patient verbally voiced understanding. Prescription sent to [facility] needs it resent to [facility] pharmacy

## 2018-07-09 ENCOUNTER — CLINICAL SUPPORT (OUTPATIENT)
Dept: UROLOGY | Facility: CLINIC | Age: 68
End: 2018-07-09
Payer: MEDICARE

## 2018-07-09 ENCOUNTER — HOSPITAL ENCOUNTER (OUTPATIENT)
Dept: RADIOLOGY | Facility: HOSPITAL | Age: 68
Discharge: HOME | End: 2018-07-09
Attending: UROLOGY
Payer: MEDICARE

## 2018-07-09 DIAGNOSIS — R82.998 CELLS AND CASTS IN URINE: ICD-10-CM

## 2018-07-09 DIAGNOSIS — N40.0 BENIGN PROSTATIC HYPERPLASIA, UNSPECIFIED WHETHER LOWER URINARY TRACT SYMPTOMS PRESENT: ICD-10-CM

## 2018-07-09 DIAGNOSIS — R82.998 CELLS AND CASTS IN URINE: Primary | ICD-10-CM

## 2018-07-09 DIAGNOSIS — R31.9 HEMATURIA, UNSPECIFIED TYPE: ICD-10-CM

## 2018-07-09 LAB
BACTERIA #/AREA URNS HPF: ABNORMAL /HPF
BILIRUB UR QL STRIP: NEGATIVE
CLARITY UR: CLEAR
COLOR UR: YELLOW
GLUCOSE UR QL STRIP: NEGATIVE
HGB UR QL STRIP: ABNORMAL
KETONES UR QL STRIP: NEGATIVE
LEUKOCYTE ESTERASE UR QL STRIP: NEGATIVE
MICROSCOPIC COMMENT: ABNORMAL
NITRITE UR QL STRIP: NEGATIVE
PH UR STRIP: 6 [PH] (ref 5–8)
PROT UR QL STRIP: ABNORMAL
RBC #/AREA URNS HPF: >100 /HPF (ref 0–4)
SP GR UR STRIP: >=1.03 (ref 1–1.03)
SQUAMOUS #/AREA URNS HPF: 1 /HPF
URN SPEC COLLECT METH UR: ABNORMAL
UROBILINOGEN UR STRIP-ACNC: NEGATIVE EU/DL

## 2018-07-09 PROCEDURE — 76770 US EXAM ABDO BACK WALL COMP: CPT | Mod: 26,,, | Performed by: RADIOLOGY

## 2018-07-09 PROCEDURE — 87086 URINE CULTURE/COLONY COUNT: CPT

## 2018-07-09 PROCEDURE — 81000 URINALYSIS NONAUTO W/SCOPE: CPT

## 2018-07-09 PROCEDURE — 99211 OFF/OP EST MAY X REQ PHY/QHP: CPT | Mod: PBBFAC,25,PN

## 2018-07-09 PROCEDURE — 99999 PR PBB SHADOW E&M-EST. PATIENT-LVL I: CPT | Mod: PBBFAC,,,

## 2018-07-09 PROCEDURE — 99499 UNLISTED E&M SERVICE: CPT | Mod: S$PBB,,, | Performed by: UROLOGY

## 2018-07-09 PROCEDURE — 76770 US EXAM ABDO BACK WALL COMP: CPT | Mod: TC

## 2018-07-09 NOTE — PROGRESS NOTES
Per  patient in clinic today for catheterized u/a and urine culture. Urine sent for u/a and urine culture.

## 2018-07-11 LAB — BACTERIA UR CULT: NO GROWTH

## 2018-07-12 ENCOUNTER — TELEPHONE (OUTPATIENT)
Dept: UROLOGY | Facility: CLINIC | Age: 68
End: 2018-07-12

## 2018-07-12 NOTE — TELEPHONE ENCOUNTER
Ok he needs to remain on doxy  Until the procedure. We can write for refill  Please reschedule a urine sample 1 week prior  Continue flomax and finasteride for now  Has he been cleared by  to be off asa for 7 days prior?

## 2018-07-12 NOTE — TELEPHONE ENCOUNTER
----- Message from Rosie Segundo sent at 7/12/2018  8:21 AM CDT -----  Contact: Antolin  Type: Needs Medical Advice    Who Called:  patient  Symptoms (please be specific):  na  How long has patient had these symptoms:  [facility]  Pharmacy name and phone #:  [facility]  Best Call Back Number: [phone]  Additional Information:  Patient is asking for procedure to be moved from 7/18/18 to 8/1/18. Thanks!

## 2018-07-13 NOTE — TELEPHONE ENCOUNTER
Spoke with informed him of recommendations. Patient verbally voiced understanding. Patient will contact 's office for clearance. Faxed over request but have not received clearance

## 2018-07-13 NOTE — TELEPHONE ENCOUNTER
----- Message from Mila Ramirez sent at 7/13/2018  9:19 AM CDT -----  Contact: Patient  Patient is calling to speak with Debbie again.  Call Back#  Thanks

## 2018-07-24 DIAGNOSIS — R82.998 CELLS AND CASTS IN URINE: Primary | ICD-10-CM

## 2018-07-25 ENCOUNTER — APPOINTMENT (OUTPATIENT)
Dept: LAB | Facility: HOSPITAL | Age: 68
End: 2018-07-25
Attending: UROLOGY
Payer: MEDICARE

## 2018-07-25 ENCOUNTER — CLINICAL SUPPORT (OUTPATIENT)
Dept: UROLOGY | Facility: CLINIC | Age: 68
End: 2018-07-25
Payer: MEDICARE

## 2018-07-25 DIAGNOSIS — R82.998 CELLS AND CASTS IN URINE: ICD-10-CM

## 2018-07-25 PROCEDURE — 99211 OFF/OP EST MAY X REQ PHY/QHP: CPT | Mod: PBBFAC,PN

## 2018-07-25 PROCEDURE — 81003 URINALYSIS AUTO W/O SCOPE: CPT

## 2018-07-25 PROCEDURE — 87086 URINE CULTURE/COLONY COUNT: CPT

## 2018-07-25 PROCEDURE — 99499 UNLISTED E&M SERVICE: CPT | Mod: S$PBB,,, | Performed by: UROLOGY

## 2018-07-25 PROCEDURE — 99999 PR PBB SHADOW E&M-EST. PATIENT-LVL I: CPT | Mod: PBBFAC,,,

## 2018-07-25 NOTE — TELEPHONE ENCOUNTER
Please obtain clearance from dr tavares  He was scheduled to see him on 7/23/18  Also confirm he stopped his asa   Confirm also he is on doxycycline

## 2018-07-25 NOTE — TELEPHONE ENCOUNTER
Spoke with patient he will call 's office for them to fax clearance due to this being the correct doctor I called. Patient did stop aspirin and is taking doxycycline

## 2018-07-25 NOTE — TELEPHONE ENCOUNTER
Is  a vascular surgery? If so they state they have not seen this patient before. Phoned patient left message on voicemail to give the office a call back.

## 2018-07-26 NOTE — TELEPHONE ENCOUNTER
"Note received in [facility] 7/23/18 from dr. baldwin  "needs f/u cta, ok to d/c asa 7d prior to surgery"  "

## 2018-07-27 LAB — BACTERIA UR CULT: NO GROWTH

## 2018-07-27 PROCEDURE — 93005 ELECTROCARDIOGRAM TRACING: CPT

## 2018-07-27 PROCEDURE — 93010 ELECTROCARDIOGRAM REPORT: CPT | Mod: ,,, | Performed by: INTERNAL MEDICINE

## 2018-07-30 ENCOUNTER — HOSPITAL ENCOUNTER (OUTPATIENT)
Dept: PREADMISSION TESTING | Facility: HOSPITAL | Age: 68
Discharge: HOME | End: 2018-07-30
Attending: UROLOGY
Payer: MEDICARE

## 2018-07-30 VITALS — BODY MASS INDEX: 25.9 KG/M2 | HEIGHT: 71 IN | WEIGHT: 185 LBS

## 2018-07-30 DIAGNOSIS — R31.9 HEMATURIA, UNSPECIFIED TYPE: ICD-10-CM

## 2018-07-30 DIAGNOSIS — N40.0 BENIGN PROSTATIC HYPERPLASIA, UNSPECIFIED WHETHER LOWER URINARY TRACT SYMPTOMS PRESENT: Primary | ICD-10-CM

## 2018-07-30 DIAGNOSIS — Z01.818 PRE-OP EXAM: ICD-10-CM

## 2018-07-30 LAB
ALBUMIN SERPL BCP-MCNC: 3.8 G/DL
ALP SERPL-CCNC: 62 U/L
ALT SERPL W/O P-5'-P-CCNC: 30 U/L
ANION GAP SERPL CALC-SCNC: 7 MMOL/L
AST SERPL-CCNC: 23 U/L
BILIRUB SERPL-MCNC: 0.5 MG/DL
BUN SERPL-MCNC: 15 MG/DL
CALCIUM SERPL-MCNC: 9.5 MG/DL
CHLORIDE SERPL-SCNC: 110 MMOL/L
CO2 SERPL-SCNC: 24 MMOL/L
CREAT SERPL-MCNC: 1.2 MG/DL
ERYTHROCYTE [DISTWIDTH] IN BLOOD BY AUTOMATED COUNT: 14.3 %
EST. GFR  (AFRICAN AMERICAN): >60 ML/MIN/1.73 M^2
EST. GFR  (NON AFRICAN AMERICAN): >60 ML/MIN/1.73 M^2
GLUCOSE SERPL-MCNC: 111 MG/DL
HCT VFR BLD AUTO: 47.2 %
HGB BLD-MCNC: 15.6 G/DL
MCH RBC QN AUTO: 28.9 PG
MCHC RBC AUTO-ENTMCNC: 33 G/DL
MCV RBC AUTO: 88 FL
PLATELET # BLD AUTO: 224 K/UL
PMV BLD AUTO: 7.7 FL
POTASSIUM SERPL-SCNC: 3.9 MMOL/L
PROT SERPL-MCNC: 7.4 G/DL
RBC # BLD AUTO: 5.39 M/UL
SODIUM SERPL-SCNC: 141 MMOL/L
WBC # BLD AUTO: 7.9 K/UL

## 2018-07-30 PROCEDURE — 85027 COMPLETE CBC AUTOMATED: CPT

## 2018-07-30 PROCEDURE — 99900104 DSU ONLY-NO CHARGE-EA ADD'L HR (STAT)

## 2018-07-30 PROCEDURE — 80053 COMPREHEN METABOLIC PANEL: CPT

## 2018-07-30 PROCEDURE — 36415 COLL VENOUS BLD VENIPUNCTURE: CPT

## 2018-07-30 PROCEDURE — 99900103 DSU ONLY-NO CHARGE-INITIAL HR (STAT)

## 2018-07-31 ENCOUNTER — ANESTHESIA EVENT (OUTPATIENT)
Dept: SURGERY | Facility: HOSPITAL | Age: 68
End: 2018-07-31
Payer: MEDICARE

## 2018-07-31 NOTE — H&P
[facility] H&P Note - [place]  Staff: MD Trixie     Referring provider and please cc: self  PCP: Dr.[facility]av[facility]ic [facility]: active      Chief Complaint: gross hematuria and frequency     Subjective:        HPI: Antolin Richardson is a 67 y.o. male presents with      OAB, BPH, recurrent uti/prostatitis.   -he initially came to see me 2/27/18 for frequency q20 to 30 minutes, weak stream and sometimes has sprayed stream. He had never been on any meds for his prostate and denied any hesistancy. He states that the sx had occurred since surgery (AAA repair and had catheter during this surgery) in November however CTA shows a very enlarged prostate. 6x5cm in transverse with large intravesical lobe. He also drinks 5 cups of coffee in the morning and 2 cups of tea in the pm. I started him on flomax 0.4mg nightly and he had a uroflow 3/26/18 which showed a mean flow of 6cc with voided volume of 70 and pvr of 17. I did a cysto trus on 4/2/18 which showed a 99g prostate with a large circumferential median lobe. PSA was initially 3.7 and repated it and it came down to 3.0 with 30% free. I referred him to  to discuss robotic simple prostatectomy however he was worried insurance would not cover visit and did not go to appt. He returns today and states that he has burning with urination, oab and denies incomplete emptying. He has had prostatitis/uti at least twice now but has never been on abx for longer than 10 days. Repeated uroflow today but not evnough voided volume (59cc). pvr by in and out cath today: 30cc    AUA ssx 6/21/18:(5 incomplete emptying, 5 frequency, 5 intermittency, 5 urgency, 5 weak stream, 0 straining, 5 sleeping). 30. QOL: unhappy    Here today for turp (possibly staged)    psa history  4/2/18  trus volume: 99g  3/19/18 3.0, %free 29.67  2/27/18 3.8, % free 31.35, Marlon: no nodules, 40+g prostate    Gross hematuria  -he's had blood in his urine intermittently since February which is  what brought him to see me as he had never had GH before. The last time was just a few days ago. Workup reveals likely from prostate.   -On asa 81mg daily. Circumcised. Denies any new frequency that is new or worse, denies any dysuria.  -cta 11/2017 shows no stones.   -cytology 2/27/18: negative, +tobacco use (1.5 ppd x 50 years). Repeat cytology 6/21/18: negative for malignancy, rare atypical cells  -cystoscopy trus 4/2/18: 99g prostate, no tumors.    ua history:   7/25/18 Ng, cath: negative  7/9/18  Ng, void: 3+blood/tr protein  6/21/18 Coag neg staph, void and cath: nit+/3+blood/1+leuk, pvr I&0: 30cc, cytology: rare atypical cells, neg for malignancy.   4/2/18              No cx, void: 1+leuk and blood   3/31/18            Ng, void: negative  3/26/18            Coag neg staph, void: 2+ wbc, nit+, 50 blood - bactrim twice a day for 10 days  3/19/18            Ng, void: 1+ blooda  2/27/18            Multiple org, void: 1+wbc, tr blood (bactrim x 7d), cytology: negative   ua today +, c/o burning     ED (did not discuss today)  -occ problems with erections.   -uses viagra  -IIEF: 13cc     REVIEW OF SYSTEMS:  General ROS: no fevers, no chills  Psychological ROS: no depression  Endocrine ROS: no heat or cold  Respiratory ROS: no SOB  Cardiovascular ROS: no CP  Gastrointestinal ROS: no abdominal pain, no constipation, no diarrhea, no BRBPR  Musculoskeletal ROS: no muscle pain  Neurological ROS: no headaches  Dermatological ROS: no rashes  HEENT: noglasses, no sinus   ROS: per HPI     PMHx:       Past Medical History:   Diagnosis Date    AAA (abdominal aortic aneurysm) 12/2016     s/p endo repair ([place])    Acid reflux      Anxiety      Coronary artery disease      Hypertension        Kidney stones: No  Cataracts? none     PSHx:        Past Surgical History:   Procedure Laterality Date    ABDOMINAL AORTIC ANEURYSM REPAIR        APPENDECTOMY        CORONARY ANGIOPLASTY   2009    coronary artery stent          x1      Stents/Valves/Foreign Bodies: Yes - last placed in   Cardiac Evaluation: Yes -      Screening Studies  Colonoscopy: 2017 found polyps     Fam Hx:   malignancies: No  . Gyn malignancies: no. Parents  and 80s and mother had brain cancer  kidney stones: No      Soc Hx:  +tobacco.  1.5 pk per day x 50 year  occ alcohol  Lives in [place]  :   Children: 3  Occupation:retired from security     Allergies:  Lisinopril     Urologic meds: flomax 0.4mg nightly, finasteride  Anticoagulation: Yes - asa 81mg daily      Objective:      Vitals:    18 0846   BP: (!) 152/78   Pulse: (!) 55   Resp: 18   Temp: 98.1 °F (36.7 °C)          General:WDWN in NAD  Eyes: PERRLA, normal conjunctiva  Respiratory: No increased work on breathing.   Cardiovascular: No obvious extremity edema. Warm and well perfused.   GI: palpation of masses. No tenderness. No hepatosplenomegaly to palpation.  Musculoskeletal: normal range of motion of bilateral upper extremities. Normal muscle strength and tone.  Skin: no obvious rashes or lesions. No tightening of skin noted.  Neurologic: CN grossly normal. Normal sensation.   Psychiatric: awake, alert and oriented x 3. Mood and affect normal. Cooperative.      18  Penis is circumcised,    Last MONE 18: 40g gland without masses, tenderness. SV not palpable. Normal sphincter tone. No hemhorroids.     pvr by in and out cath : 30cc     LABS REVIEW:     Cr:         Lab Results   Component Value Date     CREATININE 1.3 2017     CREATININE 1.3 2017              PATHOLOGY REVIEW:  Urine 17: Negative for malignant cells. Will discuss at f/u     RADIOGRAPHIC REVIEW:  cta 17 images reviewed by me  -no stones  -enlarged prostate     Interval placement of aortobiiliac endovascular stent graft which is patent with no extravasation and with partial collapse of the native surrounding aneurysm sac.    Additional findings as detailed above  "including enlarged prostate gland, diverticulosis without CT findings of acute diverticulitis, left renal masses suggesting low density and high density cysts.     Assessment:       1. Hematuria, unspecified type      BPH     Plan:      BPH with LuTS and recurrent prostatitis/uti  -because he has recurrent uti/prostatitis and a very enlarged prostate, although he empties well, I recommend surgical treatment for his bph.   -continue flomax 0.4mg nightly and finasteride for now but will plan to d/c 1 month after TUR  -we reviewed robot simple prostatectomy vs TUR or staged TUR and after discussion the patient wants to proceed with TUR, possibly staged TUR on July 18.   -we need clearance to be off asa with recent AAA repair ()- note received in [facility] on 7/23/18 from  "needs f/u cta, ok to d/c asa 7d prior to surgery"  -we went over the risks and benefits of tur including intermittent bleeding or persistent infection, finding prostate cancer   - Also explained bc he has never been treated for more than 10 days I want to start him on doxy 14 days prior to his turp and continue it for another 2 weeks after. If his culture is sterile/negative then I will go ahead and start him on cipro 500 bid x 4 weeks, 2 weeks prior to the turp - he says he took this    Here today for turp  The patient is scheduled for transurethral resection of a prostate. The risks and benefits of resection of TURP were discussed with the patient in detail.     We discussed different treatment options for symptomatic BPH and because of his very enlarged prostate and recurrent prostatitis and slow stream we decided on TURP.    Consent was obtained.  The risks include but are not limited to urethral stricture, bladder neck contracture, incomplete emptying or retention requiring a catheter, urethral meatus stricture, burning with urination, overactive bladder, bleeding, infection, pain, need for further procedures, injury to the " kidney, ureter, bladder, bladder perforation, prostate capsular penetration, need for open surgery, need for continued use of prostate medications and chance of finding prostate cancer in the specimen chips.  The patient was informed that they may require a ureteral stent and that stents can cause irritative voiding symptoms.  They also understand that ureteral stents are temporary and must be removed or exchanged in a timely fashion or they can calcify and make more stones and become difficult to remove. The patient understands they may will need a catheter post op and observation in the hospital overnight on continuous bladder irrigation.     Patient understands these risks and has agreed to proceed with surgery.           Gross hematuria  -already had a cta in 11/2017  -urine for culture  (no sx, frequency not new   -urine cytology (h/o smoking) 2/2018 negative, but will repeat today  -cysto trus was negative 4/2018 but will plan to do retrograde pyelogram and another cysto at time of TUR.        H&P update  I have reviewed the relevant H&P and reviewed the relevant labs and radiology and updated today's H&P.  The pt is on asa 81mg anticoagulation and has not been for the past 7+ days  Most recent urine culture was on 7/25/18 and was ng.  The patient denies any uti symptoms including fever or burning.

## 2018-08-01 ENCOUNTER — TELEPHONE (OUTPATIENT)
Dept: UROLOGY | Facility: CLINIC | Age: 68
End: 2018-08-01

## 2018-08-01 ENCOUNTER — HOSPITAL ENCOUNTER (OUTPATIENT)
Facility: HOSPITAL | Age: 68
Discharge: HOME | End: 2018-08-02
Attending: UROLOGY | Admitting: UROLOGY
Payer: MEDICARE

## 2018-08-01 ENCOUNTER — ANESTHESIA (OUTPATIENT)
Dept: SURGERY | Facility: HOSPITAL | Age: 68
End: 2018-08-01
Payer: MEDICARE

## 2018-08-01 DIAGNOSIS — N40.0 BPH (BENIGN PROSTATIC HYPERPLASIA): ICD-10-CM

## 2018-08-01 PROCEDURE — 36000707: Performed by: UROLOGY

## 2018-08-01 PROCEDURE — 71000033 HC RECOVERY, INTIAL HOUR: Performed by: UROLOGY

## 2018-08-01 PROCEDURE — 37000008 HC ANESTHESIA 1ST 15 MINUTES: Performed by: UROLOGY

## 2018-08-01 PROCEDURE — 99900103 DSU ONLY-NO CHARGE-INITIAL HR (STAT): Performed by: UROLOGY

## 2018-08-01 PROCEDURE — 88305 TISSUE EXAM BY PATHOLOGIST: CPT | Mod: 26,,, | Performed by: PATHOLOGY

## 2018-08-01 PROCEDURE — 71000039 HC RECOVERY, EACH ADD'L HOUR: Performed by: UROLOGY

## 2018-08-01 PROCEDURE — D9220A PRA ANESTHESIA: Mod: ANES,,, | Performed by: ANESTHESIOLOGY

## 2018-08-01 PROCEDURE — 63600175 PHARM REV CODE 636 W HCPCS: Performed by: UROLOGY

## 2018-08-01 PROCEDURE — 99900104 DSU ONLY-NO CHARGE-EA ADD'L HR (STAT): Performed by: UROLOGY

## 2018-08-01 PROCEDURE — 25000003 PHARM REV CODE 250: Performed by: UROLOGY

## 2018-08-01 PROCEDURE — 27201423 OPTIME MED/SURG SUP & DEVICES STERILE SUPPLY: Performed by: UROLOGY

## 2018-08-01 PROCEDURE — 25000003 PHARM REV CODE 250: Performed by: ANESTHESIOLOGY

## 2018-08-01 PROCEDURE — 36000706: Performed by: UROLOGY

## 2018-08-01 PROCEDURE — 63600175 PHARM REV CODE 636 W HCPCS: Performed by: NURSE ANESTHETIST, CERTIFIED REGISTERED

## 2018-08-01 PROCEDURE — 25000003 PHARM REV CODE 250: Performed by: NURSE ANESTHETIST, CERTIFIED REGISTERED

## 2018-08-01 PROCEDURE — D9220A PRA ANESTHESIA: Mod: CRNA,,, | Performed by: NURSE ANESTHETIST, CERTIFIED REGISTERED

## 2018-08-01 PROCEDURE — 37000009 HC ANESTHESIA EA ADD 15 MINS: Performed by: UROLOGY

## 2018-08-01 PROCEDURE — 88305 TISSUE EXAM BY PATHOLOGIST: CPT | Performed by: PATHOLOGY

## 2018-08-01 PROCEDURE — 52601 PROSTATECTOMY (TURP): CPT | Mod: 22,,, | Performed by: UROLOGY

## 2018-08-01 RX ORDER — ACETAMINOPHEN 10 MG/ML
INJECTION, SOLUTION INTRAVENOUS
Status: DISCONTINUED | OUTPATIENT
Start: 2018-08-01 | End: 2018-08-01

## 2018-08-01 RX ORDER — FENTANYL CITRATE 50 UG/ML
25 INJECTION, SOLUTION INTRAMUSCULAR; INTRAVENOUS EVERY 5 MIN PRN
Status: DISCONTINUED | OUTPATIENT
Start: 2018-08-01 | End: 2018-08-01 | Stop reason: HOSPADM

## 2018-08-01 RX ORDER — ONDANSETRON 2 MG/ML
INJECTION INTRAMUSCULAR; INTRAVENOUS
Status: DISCONTINUED | OUTPATIENT
Start: 2018-08-01 | End: 2018-08-01

## 2018-08-01 RX ORDER — METOPROLOL SUCCINATE 50 MG/1
100 TABLET, EXTENDED RELEASE ORAL DAILY
Status: DISCONTINUED | OUTPATIENT
Start: 2018-08-02 | End: 2018-08-02 | Stop reason: HOSPADM

## 2018-08-01 RX ORDER — ROCURONIUM BROMIDE 10 MG/ML
INJECTION, SOLUTION INTRAVENOUS
Status: DISCONTINUED | OUTPATIENT
Start: 2018-08-01 | End: 2018-08-01

## 2018-08-01 RX ORDER — MIDAZOLAM HYDROCHLORIDE 1 MG/ML
INJECTION, SOLUTION INTRAMUSCULAR; INTRAVENOUS
Status: DISCONTINUED | OUTPATIENT
Start: 2018-08-01 | End: 2018-08-01

## 2018-08-01 RX ORDER — LIDOCAINE HCL/PF 100 MG/5ML
SYRINGE (ML) INTRAVENOUS
Status: DISCONTINUED | OUTPATIENT
Start: 2018-08-01 | End: 2018-08-01

## 2018-08-01 RX ORDER — OXYCODONE HYDROCHLORIDE 5 MG/1
5 TABLET ORAL ONCE AS NEEDED
Status: COMPLETED | OUTPATIENT
Start: 2018-08-01 | End: 2018-08-01

## 2018-08-01 RX ORDER — AMLODIPINE BESYLATE 5 MG/1
10 TABLET ORAL DAILY
Status: DISCONTINUED | OUTPATIENT
Start: 2018-08-02 | End: 2018-08-02 | Stop reason: HOSPADM

## 2018-08-01 RX ORDER — FINASTERIDE 5 MG/1
5 TABLET, FILM COATED ORAL DAILY
Status: DISCONTINUED | OUTPATIENT
Start: 2018-08-02 | End: 2018-08-02 | Stop reason: HOSPADM

## 2018-08-01 RX ORDER — HYDROCODONE BITARTRATE AND ACETAMINOPHEN 5; 325 MG/1; MG/1
1 TABLET ORAL EVERY 6 HOURS PRN
Qty: 15 TABLET | Refills: 0 | Status: SHIPPED | OUTPATIENT
Start: 2018-08-01 | End: 2018-08-11

## 2018-08-01 RX ORDER — ATORVASTATIN CALCIUM 40 MG/1
40 TABLET, FILM COATED ORAL NIGHTLY
Status: DISCONTINUED | OUTPATIENT
Start: 2018-08-01 | End: 2018-08-02 | Stop reason: HOSPADM

## 2018-08-01 RX ORDER — FENTANYL CITRATE 50 UG/ML
INJECTION, SOLUTION INTRAMUSCULAR; INTRAVENOUS
Status: DISCONTINUED | OUTPATIENT
Start: 2018-08-01 | End: 2018-08-01

## 2018-08-01 RX ORDER — POLYETHYLENE GLYCOL 3350 17 G/17G
POWDER, FOR SOLUTION ORAL
Qty: 255 G | Refills: 0 | Status: SHIPPED | OUTPATIENT
Start: 2018-08-01 | End: 2023-04-28

## 2018-08-01 RX ORDER — POLYETHYLENE GLYCOL 3350 17 G/17G
17 POWDER, FOR SOLUTION ORAL DAILY
Status: DISCONTINUED | OUTPATIENT
Start: 2018-08-02 | End: 2018-08-02 | Stop reason: HOSPADM

## 2018-08-01 RX ORDER — LIDOCAINE HYDROCHLORIDE 20 MG/ML
JELLY TOPICAL
Status: DISCONTINUED | OUTPATIENT
Start: 2018-08-01 | End: 2018-08-02 | Stop reason: HOSPADM

## 2018-08-01 RX ORDER — ONDANSETRON 2 MG/ML
4 INJECTION INTRAMUSCULAR; INTRAVENOUS ONCE AS NEEDED
Status: DISCONTINUED | OUTPATIENT
Start: 2018-08-01 | End: 2018-08-01 | Stop reason: HOSPADM

## 2018-08-01 RX ORDER — PROPOFOL 10 MG/ML
VIAL (ML) INTRAVENOUS
Status: DISCONTINUED | OUTPATIENT
Start: 2018-08-01 | End: 2018-08-01

## 2018-08-01 RX ORDER — DEXAMETHASONE SODIUM PHOSPHATE 4 MG/ML
INJECTION, SOLUTION INTRA-ARTICULAR; INTRALESIONAL; INTRAMUSCULAR; INTRAVENOUS; SOFT TISSUE
Status: DISCONTINUED | OUTPATIENT
Start: 2018-08-01 | End: 2018-08-01

## 2018-08-01 RX ORDER — GENTAMICIN SULFATE 80 MG/100ML
80 INJECTION, SOLUTION INTRAVENOUS
Status: COMPLETED | OUTPATIENT
Start: 2018-08-01 | End: 2018-08-01

## 2018-08-01 RX ORDER — OXYBUTYNIN CHLORIDE 5 MG/1
10 TABLET, EXTENDED RELEASE ORAL DAILY
Status: DISCONTINUED | OUTPATIENT
Start: 2018-08-01 | End: 2018-08-02 | Stop reason: HOSPADM

## 2018-08-01 RX ORDER — EPHEDRINE SULFATE 50 MG/ML
INJECTION, SOLUTION INTRAVENOUS
Status: DISCONTINUED | OUTPATIENT
Start: 2018-08-01 | End: 2018-08-01

## 2018-08-01 RX ORDER — TAMSULOSIN HYDROCHLORIDE 0.4 MG/1
0.8 CAPSULE ORAL
Status: DISCONTINUED | OUTPATIENT
Start: 2018-08-01 | End: 2018-08-02 | Stop reason: HOSPADM

## 2018-08-01 RX ORDER — HYDROCODONE BITARTRATE AND ACETAMINOPHEN 5; 325 MG/1; MG/1
1 TABLET ORAL EVERY 4 HOURS PRN
Status: DISCONTINUED | OUTPATIENT
Start: 2018-08-01 | End: 2018-08-02 | Stop reason: HOSPADM

## 2018-08-01 RX ORDER — GLYCOPYRROLATE 0.2 MG/ML
INJECTION INTRAMUSCULAR; INTRAVENOUS
Status: DISCONTINUED | OUTPATIENT
Start: 2018-08-01 | End: 2018-08-01

## 2018-08-01 RX ORDER — LIDOCAINE HYDROCHLORIDE 10 MG/ML
0.5 INJECTION, SOLUTION EPIDURAL; INFILTRATION; INTRACAUDAL; PERINEURAL ONCE
Status: COMPLETED | OUTPATIENT
Start: 2018-08-01 | End: 2018-08-01

## 2018-08-01 RX ORDER — SODIUM CHLORIDE, SODIUM LACTATE, POTASSIUM CHLORIDE, CALCIUM CHLORIDE 600; 310; 30; 20 MG/100ML; MG/100ML; MG/100ML; MG/100ML
INJECTION, SOLUTION INTRAVENOUS CONTINUOUS
Status: DISCONTINUED | OUTPATIENT
Start: 2018-08-01 | End: 2018-08-01

## 2018-08-01 RX ADMIN — DEXAMETHASONE SODIUM PHOSPHATE 8 MG: 4 INJECTION, SOLUTION INTRAMUSCULAR; INTRAVENOUS at 01:08

## 2018-08-01 RX ADMIN — EPHEDRINE SULFATE 20 MG: 50 INJECTION, SOLUTION INTRAMUSCULAR; INTRAVENOUS; SUBCUTANEOUS at 03:08

## 2018-08-01 RX ADMIN — OXYBUTYNIN CHLORIDE 10 MG: 5 TABLET, EXTENDED RELEASE ORAL at 05:08

## 2018-08-01 RX ADMIN — FENTANYL CITRATE 100 MCG: 50 INJECTION, SOLUTION INTRAMUSCULAR; INTRAVENOUS at 01:08

## 2018-08-01 RX ADMIN — LIDOCAINE HYDROCHLORIDE: 20 JELLY TOPICAL at 11:08

## 2018-08-01 RX ADMIN — FENTANYL CITRATE 50 MCG: 50 INJECTION, SOLUTION INTRAMUSCULAR; INTRAVENOUS at 04:08

## 2018-08-01 RX ADMIN — OXYCODONE HYDROCHLORIDE 5 MG: 5 TABLET ORAL at 05:08

## 2018-08-01 RX ADMIN — EPHEDRINE SULFATE 10 MG: 50 INJECTION, SOLUTION INTRAMUSCULAR; INTRAVENOUS; SUBCUTANEOUS at 01:08

## 2018-08-01 RX ADMIN — PROPOFOL 150 MG: 10 INJECTION, EMULSION INTRAVENOUS at 01:08

## 2018-08-01 RX ADMIN — SODIUM CHLORIDE, SODIUM LACTATE, POTASSIUM CHLORIDE, AND CALCIUM CHLORIDE: .6; .31; .03; .02 INJECTION, SOLUTION INTRAVENOUS at 09:08

## 2018-08-01 RX ADMIN — LIDOCAINE HYDROCHLORIDE: 10 INJECTION, SOLUTION EPIDURAL; INFILTRATION; INTRACAUDAL; PERINEURAL at 09:08

## 2018-08-01 RX ADMIN — TAMSULOSIN HYDROCHLORIDE 0.8 MG: 0.4 CAPSULE ORAL at 08:08

## 2018-08-01 RX ADMIN — FENTANYL CITRATE 50 MCG: 50 INJECTION, SOLUTION INTRAMUSCULAR; INTRAVENOUS at 03:08

## 2018-08-01 RX ADMIN — SODIUM CHLORIDE, SODIUM LACTATE, POTASSIUM CHLORIDE, AND CALCIUM CHLORIDE: .6; .31; .03; .02 INJECTION, SOLUTION INTRAVENOUS at 02:08

## 2018-08-01 RX ADMIN — GLYCOPYRROLATE 0.2 MG: 0.2 INJECTION, SOLUTION INTRAMUSCULAR; INTRAVENOUS at 03:08

## 2018-08-01 RX ADMIN — ACETAMINOPHEN 1000 MG: 10 INJECTION, SOLUTION INTRAVENOUS at 01:08

## 2018-08-01 RX ADMIN — MIDAZOLAM 2 MG: 1 INJECTION INTRAMUSCULAR; INTRAVENOUS at 01:08

## 2018-08-01 RX ADMIN — GLYCOPYRROLATE 0.2 MG: 0.2 INJECTION, SOLUTION INTRAMUSCULAR; INTRAVENOUS at 01:08

## 2018-08-01 RX ADMIN — LIDOCAINE HYDROCHLORIDE 100 MG: 20 INJECTION, SOLUTION INTRAVENOUS at 01:08

## 2018-08-01 RX ADMIN — GENTAMICIN SULFATE 80 MG: 80 INJECTION, SOLUTION INTRAVENOUS at 01:08

## 2018-08-01 RX ADMIN — ATORVASTATIN CALCIUM 40 MG: 40 TABLET, FILM COATED ORAL at 08:08

## 2018-08-01 RX ADMIN — ONDANSETRON 4 MG: 2 INJECTION, SOLUTION INTRAMUSCULAR; INTRAVENOUS at 01:08

## 2018-08-01 RX ADMIN — ROCURONIUM BROMIDE 50 MG: 10 INJECTION, SOLUTION INTRAVENOUS at 01:08

## 2018-08-01 RX ADMIN — FENTANYL CITRATE 50 MCG: 50 INJECTION, SOLUTION INTRAMUSCULAR; INTRAVENOUS at 02:08

## 2018-08-01 NOTE — ANESTHESIA PREPROCEDURE EVALUATION
08/01/2018  Antolin Richardson is a 67 y.o., male.    Anesthesia Evaluation    I have reviewed the Patient Summary Reports.    I have reviewed the Nursing Notes.      Review of Systems  Anesthesia Hx:  No problems with previous Anesthesia    Cardiovascular:   Hypertension, well controlled Past MI CAD asymptomatic CABG/stent  ECG has been reviewed. CAD - stent X 1 - stable       Physical Exam  General:  Well nourished    Airway/Jaw/Neck:  Airway Findings: Mallampati: II                Anesthesia Plan  Type of Anesthesia, risks & benefits discussed:  Anesthesia Type:  general  Patient's Preference:   Intra-op Monitoring Plan:   Intra-op Monitoring Plan Comments:   Post Op Pain Control Plan:   Post Op Pain Control Plan Comments:   Induction:   IV  Beta Blocker:  Patient is not currently on a Beta-Blocker (No further documentation required).       Informed Consent: Patient understands risks and agrees with Anesthesia plan.  Questions answered. Anesthesia consent signed with patient.  ASA Score: 3     Day of Surgery Review of History & Physical:    H&P update referred to the surgeon.         Ready For Surgery From Anesthesia Perspective.

## 2018-08-01 NOTE — TELEPHONE ENCOUNTER
----- Message from Poly Marcum MD sent at 8/1/2018  4:39 PM CDT -----  Return next Thursday for fill and pull, return that afternoon for pvr. Will teach pt how to dilate meatus at this time with meatal dilator. Pt needs to bring this to appointment (nurse visit but I will see)

## 2018-08-01 NOTE — TRANSFER OF CARE
"Anesthesia Transfer of Care Note    Patient: Antolin Richardson    Procedure(s) Performed: Procedure(s) (LRB):  TRANSURETHRAL RESECTION OF PROSTATE (TURP) (N/A)    Patient location: PACU    Anesthesia Type: general    Transport from OR: Transported from OR on 2-3 L/min O2 by NC with adequate spontaneous ventilation    Post pain: adequate analgesia    Post assessment: tolerated procedure well and no apparent anesthetic complications    Post vital signs: stable    Level of consciousness: responds to stimulation and sedated    Nausea/Vomiting: no nausea/vomiting    Complications: none    Transfer of care protocol was followed      Last vitals:   Visit Vitals  BP (!) 152/78 (BP Location: Left arm, Patient Position: Lying)   Pulse (!) 55   Temp 36.7 °C (98.1 °F) (Skin)   Resp 18   Ht 5' 11" (1.803 m)   Wt 83.9 kg (185 lb)   SpO2 96%   BMI 25.80 kg/m²     "

## 2018-08-01 NOTE — ANESTHESIA POSTPROCEDURE EVALUATION
"Anesthesia Post Evaluation    Patient: Antolin Richardson    Procedure(s) Performed: Procedure(s) (LRB):  TRANSURETHRAL RESECTION OF PROSTATE (TURP) (N/A)    Final Anesthesia Type: general  Patient location during evaluation: PACU  Patient participation: Yes- Able to Participate  Level of consciousness: awake and alert  Post-procedure vital signs: reviewed and stable  Pain management: adequate  Airway patency: patent  PONV status at discharge: No PONV  Anesthetic complications: no      Cardiovascular status: blood pressure returned to baseline and hemodynamically stable  Respiratory status: unassisted  Hydration status: euvolemic  Follow-up not needed.        Visit Vitals  BP (!) 152/78 (BP Location: Left arm, Patient Position: Lying)   Pulse (!) 55   Temp 36.7 °C (98.1 °F) (Temporal)   Resp 18   Ht 5' 11" (1.803 m)   Wt 83.9 kg (185 lb)   SpO2 96%   BMI 25.80 kg/m²       Pain/Dinesh Score: Pain Assessment Performed: Yes (8/1/2018  4:30 PM)  Presence of Pain: non-verbal indicators absent (8/1/2018  4:30 PM)  Dinesh Score: 4 (8/1/2018  4:30 PM)      "

## 2018-08-02 VITALS
RESPIRATION RATE: 16 BRPM | WEIGHT: 185 LBS | HEART RATE: 60 BPM | SYSTOLIC BLOOD PRESSURE: 143 MMHG | TEMPERATURE: 98 F | BODY MASS INDEX: 25.9 KG/M2 | OXYGEN SATURATION: 94 % | HEIGHT: 71 IN | DIASTOLIC BLOOD PRESSURE: 71 MMHG

## 2018-08-02 LAB
ANION GAP SERPL CALC-SCNC: 9 MMOL/L
BASOPHILS # BLD AUTO: 0 K/UL
BASOPHILS NFR BLD: 0 %
BUN SERPL-MCNC: 16 MG/DL
CALCIUM SERPL-MCNC: 8.6 MG/DL
CHLORIDE SERPL-SCNC: 110 MMOL/L
CO2 SERPL-SCNC: 20 MMOL/L
CREAT SERPL-MCNC: 1.1 MG/DL
DIFFERENTIAL METHOD: ABNORMAL
EOSINOPHIL # BLD AUTO: 0 K/UL
EOSINOPHIL NFR BLD: 0 %
ERYTHROCYTE [DISTWIDTH] IN BLOOD BY AUTOMATED COUNT: 14 %
EST. GFR  (AFRICAN AMERICAN): >60 ML/MIN/1.73 M^2
EST. GFR  (NON AFRICAN AMERICAN): >60 ML/MIN/1.73 M^2
GLUCOSE SERPL-MCNC: 186 MG/DL
HCT VFR BLD AUTO: 39.8 %
HGB BLD-MCNC: 13.5 G/DL
LYMPHOCYTES # BLD AUTO: 0.9 K/UL
LYMPHOCYTES NFR BLD: 6.2 %
MCH RBC QN AUTO: 29.3 PG
MCHC RBC AUTO-ENTMCNC: 33.9 G/DL
MCV RBC AUTO: 86 FL
MONOCYTES # BLD AUTO: 0.6 K/UL
MONOCYTES NFR BLD: 4.3 %
NEUTROPHILS # BLD AUTO: 12.7 K/UL
NEUTROPHILS NFR BLD: 89.5 %
PLATELET # BLD AUTO: 217 K/UL
PMV BLD AUTO: 8 FL
POTASSIUM SERPL-SCNC: 4 MMOL/L
RBC # BLD AUTO: 4.61 M/UL
SODIUM SERPL-SCNC: 139 MMOL/L
WBC # BLD AUTO: 14.2 K/UL

## 2018-08-02 PROCEDURE — 25000003 PHARM REV CODE 250: Performed by: UROLOGY

## 2018-08-02 PROCEDURE — 85025 COMPLETE CBC W/AUTO DIFF WBC: CPT

## 2018-08-02 PROCEDURE — 80048 BASIC METABOLIC PNL TOTAL CA: CPT

## 2018-08-02 PROCEDURE — 36415 COLL VENOUS BLD VENIPUNCTURE: CPT

## 2018-08-02 RX ADMIN — OXYBUTYNIN CHLORIDE 10 MG: 5 TABLET, EXTENDED RELEASE ORAL at 09:08

## 2018-08-02 RX ADMIN — LIDOCAINE HYDROCHLORIDE: 20 JELLY TOPICAL at 09:08

## 2018-08-02 RX ADMIN — AMLODIPINE BESYLATE 10 MG: 5 TABLET ORAL at 09:08

## 2018-08-02 RX ADMIN — METOPROLOL SUCCINATE 100 MG: 50 TABLET, EXTENDED RELEASE ORAL at 09:08

## 2018-08-02 RX ADMIN — FINASTERIDE 5 MG: 5 TABLET, FILM COATED ORAL at 09:08

## 2018-08-02 NOTE — PROGRESS NOTES
CBI titrated until urine was a light pink color.  Discontinued at 0600.  20ml of fluid removed from balloon per orders.  Patient tolerated well.  Urine currently light pink in color.  Patient tolerated well.

## 2018-08-02 NOTE — DISCHARGE INSTRUCTIONS
Patient instructions:    Continue flomax and finasteride x 1 more month  Finish antibiotics  norco 5, disp 15 prn pain, no driving while taking pain medicines  miralax 1 cap daily - constipation will worsen hematuria  Lots of fluid intake to keep urine clear  Restart aspirin 81mg in 2 days if urine light pink   Ok tow work as long as not taking pain meds  Lidocaine jelly to tip of penis twice a day\  Sutures at tip of penis dissolvable. Expect blood around catheter but If tip bleeding at sutures then hold pressure  ky jelly throughout day to tip of penis and clean off with new clean rag throughout day  Leg to leg bag. Ok to disconnect in shower then apply alcohol to each end and reconnect.   Counseled when to return to ER (call  on way) or when to come to clinic. Watch for any testicular swelling or pain as well.     Return next Thursday for fill and pull, return that afternoon for pvr. Will teach pt how to dilate meatus at this time with meatal dilator. Pt needs to bring this to appointment (nurse visit but I will see)     After Discharge:   Take at least three deep breaths and cough every two hours for the next few days.   A catheter will may remain in your bladder to drain your urine.    Your urine may have some blood. Drink plenty of fluids to keep your urine clear  (10-12 cups of water/day).   Your catheter will remain until the bleeding subsides and your surgeon deems it appropriate to be removed, usually on Monday, but possibly longer depending on your situation.   You may perform limited/non-strenuous activities as tolerated until released by your physician. Do not lift anything heavier than 10 pounds.   You may go home with prescriptions for pain, burning (pyridium or uribel, you can also buy azo OTC), stool softener, antibiotics, lidocaine jelly to apply to tip of penis while catheter in place.   You may resume your normal diet, high in fiber (fruits and vegetables).  -if you have  constipation normally or strain to have a bowel movement you can cause bleeding as the rectum pushes on the prostate. Please take stool softeners starting today to prevent this if you are prone to this or are taking pain medicines.    Drink 10 glasses of water daily.   Walk 4 times daily.   Continue foot pumping exercises while sitting.   No heavy lifting over 10 lbs. for 4 weeks.   No driving for at least 24 hours and not taking narcotic pain medication.   Call the clinic for any fever over 101 F, increased pain, nausea/vomiting, increasing voiding  difficulties, increasing blood or clots in the urine, or other concerns   When the catheter is removed you may experience burning with urination. Medicines can prescribed to help with these symptoms.  -you may also continue to see blood clots or pieces of tissue intermittently after the catheter is removed, this is normal  -if you are on blood thinners other than aspirin and were not told when to resume blood thinners, call the clinic to ask for directions.   -if you are on aspirin 81mg ok to resume 1 day after discharge even if urine is pink  -if you are on aspirin 325mg ok to resume aspirin 81mg 1 day after discharge even if urine is pink and then change to asa 325mg once urine is light pink to clear.       Your return appointment with the nurse for a fill and pull:  Your return appointment with the doctor is:

## 2018-08-02 NOTE — NURSING
Spoke w Dr. Marucm regarding irrigation and reddish urine with ambulation. She okayed to DC patient.

## 2018-08-02 NOTE — PROGRESS NOTES
Dr. Perez notified of patient's request to ambulate off unit and opportunity to shower. MD also notified of patient's loss of IV access.   See new orders.

## 2018-08-02 NOTE — PROGRESS NOTES
Urology Progress Note-[facility]  Staff: Trixie/Gaviota/Armani/Néstor    Referring physician or department: none    Subjective:      Antolin Richardson is a 67 y.o. male with bph s/p turp and meatotomy. Pod 1      cbi overnight, traction removed last night  cbi discontinued this morning  Urine rancho aid without clots after ambulating but light pink aafter sitting  No complaints of pain  Bleeding around meatus     Objective:     Temp:  [96.2 °F (35.7 °C)-98.2 °F (36.8 °C)] 97.8 °F (36.6 °C)  Pulse:  [52-71] 58  Resp:  [9-20] 14  SpO2:  [92 %-98 %] 94 %  BP: ()/(54-78) 146/70  I/O last 3 completed shifts:  In: 1000 [I.V.:1000]  Out: -   No intake/output data recorded.    UOP - cbi overnight    Physical Exam   Nursing note and vitals reviewed.  Constitutional: Pt is oriented to person, place, and time. Pt appears well-developed and well-nourished.   HENT:   Head: Normocephalic and atraumatic.   Eyes: Pupils are equal, round, and reactive to light.   Cardiovascular: no pallor  Pulmonary/Chest: Effort normal.   Abdominal: no distension  Musculoskeletal: Normal range of motion.   Neurological: Pt is alert and oriented to person, place, and time.   Skin: Skin is intact.     Psychiatric: Pt has a normal mood and affect.     Ramírez with light pink urine   Meatus with dried blood- cleaned off and showed wife sutures from meatotomy, how to clean off tip and how, where and when to appy lidocaine jelly    Labs:       Recent Labs  Lab 07/30/18  0840 08/02/18  0517    139   K 3.9 4.0    110   CO2 24 20*   BUN 15 16   CREATININE 1.2 1.1   CALCIUM 9.5 8.6*       Recent Labs  Lab 07/30/18  0840 08/02/18  0517   WBC 7.90 14.20*   HGB 15.6 13.5*   HCT 47.2 39.8*    217       Assessment:     Antolin Richardson is a 67 y.o. male with bph s/p turp and meatotomy, pod 1 .         Plan:     Continue flomax and finasteride x 1 more month  Finish antibiotics  norco 5, disp 15 prn pain, no driving while taking pain  medicines  miralax 1 cap daily - constipation will worsen hematuria  Lots of fluid intake to keep urine clear  Restart aspirin 81mg in 2 days if urine light pink   Ok tow work as long as not taking pain meds  Lidocaine jelly to tip of penis twice a day\  Sutures at tip of penis dissolvable. Expect blood around catheter but If tip bleeding at sutures then hold pressure  ky jelly throughout day to tip of penis and clean off with new clean rag throughout day  Leg to leg bag. Ok to disconnect in shower then apply alcohol to each end and reconnect.   Counseled when to return to ER (call  on way) or when to come to clinic. Watch for any testicular swelling or pain as well.     Return next Thursday for fill and pull, return that afternoon for pvr. Will teach pt how to dilate meatus at this time with meatal dilator. Pt needs to bring this to appointment (nurse visit but I will see)     Poly Marcum MD  [facility] ([facility]rixie)   Office: [phone]

## 2018-08-03 RX ORDER — TAMSULOSIN HYDROCHLORIDE 0.4 MG/1
0.4 CAPSULE ORAL
Qty: 30 CAPSULE | Refills: 0 | OUTPATIENT
Start: 2018-08-03 | End: 2018-09-02

## 2018-08-03 NOTE — NURSING
Discharge instructions given to patient. Leg bag and drainage bag education given and verbalized understanding by patient. Extra leg bag as well as night drainage bag given to patient; as well as extra stat locks. No PIV to remove. Patient ambulated down stairs with another nurse. Got into personal vehicle with wife for transport.

## 2018-08-03 NOTE — DISCHARGE SUMMARY
[facility]  Urology  Discharge Note - Short Stay      Patient Name: Antolin Richardson  MRN: [id number]  Discharge Date and Time:  08/03/2018 12:42 PM  Attending Physician: No att. providers found   Discharging Provider: Poly Marcum MD  Primary Care Physician: Olive Goss MD    Final Active Diagnoses:    Diagnosis Date Noted POA    BPH (benign prostatic hyperplasia) [N40.0] 03/26/2018 Yes      Problems Resolved During this Admission:    Diagnosis Date Noted Date Resolved POA       Final Diagnoses: Same as principal problem.    Hospital Course: Patient was admitted for an outpatient procedure and tolerated the procedure well with no complications.*    Procedure(s) (LRB):  TRANSURETHRAL RESECTION OF PROSTATE (TURP) (N/A)     Indwelling Lines/Drains at time of discharge:   Lines/Drains/Airways     Drain                 Urethral Catheter 08/01/18 1555 [place];Triple-lumen 1 day                Discharged Condition: good    Disposition: home    Follow Up:  Follow-up Information     Poly Marcum MD In 1 week.    Specialty:  Urology  Why:  Return on Thursday for Nurse visit for Fill and Pull.  Contact information:  [address] [place] [address] [address] [place] [address] [phone]                   Patient Instructions:     Activity as tolerated         Medications:  Reconciled Home Medications:      Medication List      START taking these medications    HYDROcodone-acetaminophen 5-325 mg per tablet  Commonly known as:  NORCO  Take 1 tablet by mouth every 6 (six) hours as needed for Pain.     polyethylene glycol 17 gram/dose powder  Commonly known as:  GLYCOLAX  1/2 to 1 cap daily to prevent constipation on pain meds        ASK your doctor about these medications    amLODIPine 10 MG tablet  Commonly known as:  NORVASC  Take 1 tablet (10 mg total) by mouth once daily.     aspirin 81 MG EC tablet  Commonly known as:  ECOTRIN  Take 1 tablet (81 mg total) by mouth once daily.      atorvastatin 40 MG tablet  Commonly known as:  LIPITOR  Take 1 tablet (40 mg total) by mouth every evening.     finasteride 5 mg tablet  Commonly known as:  PROSCAR  Take 1 tablet (5 mg total) by mouth once daily.     metoprolol succinate 100 MG 24 hr tablet  Commonly known as:  TOPROL-XL  Take 1 tablet (100 mg total) by mouth once daily.     sildenafil 100 MG tablet  Commonly known as:  VIAGRA  Take 1 tablet (100 mg total) by mouth daily as needed for Erectile Dysfunction.     tamsulosin 0.4 mg Cap  Commonly known as:  FLOMAX  Take 2 capsules (0.8 mg total) by mouth after dinner.            Discharge Procedure Orders (must include Diet, Follow-up, Activity):    Discharge Procedure Orders (must include Diet, Follow-up, Activity)  Activity as tolerated              Poly Mracum MD  Urology  [facility]

## 2018-08-06 ENCOUNTER — TELEPHONE (OUTPATIENT)
Dept: UROLOGY | Facility: CLINIC | Age: 68
End: 2018-08-06

## 2018-08-06 RX ORDER — OXYBUTYNIN CHLORIDE 10 MG/1
10 TABLET, EXTENDED RELEASE ORAL DAILY
Qty: 20 TABLET | Refills: 0 | Status: SHIPPED | OUTPATIENT
Start: 2018-08-06 | End: 2018-08-06 | Stop reason: SDUPTHER

## 2018-08-06 RX ORDER — OXYBUTYNIN CHLORIDE 10 MG/1
10 TABLET, EXTENDED RELEASE ORAL DAILY
Qty: 20 TABLET | Refills: 0 | Status: SHIPPED | OUTPATIENT
Start: 2018-08-06 | End: 2018-09-25 | Stop reason: SDUPTHER

## 2018-08-06 NOTE — TELEPHONE ENCOUNTER
----- Message from Rosie Segundo sent at 8/6/2018  1:39 PM CDT -----  Contact: Antolin  Type:  RX Refill Request    Who Called:  patient  Refill or New Rx:  refill  RX Name and Strength:  oxybutynin (DITROPAN-XL) 10 MG 24 hr tablet  How is the patient currently taking it? (ex. 1XDay):  Take 1 tablet (10 mg total) by mouth once daily. for 10 days   Is this a 30 day or 90 day RX:  10 day supply  Preferred Pharmacy with phone number:  [facility], [address] [place] [address], [place] [address]; [phone]    Local or Mail Order:  Local   Ordering Provider:  Dr Poly Marcum  Best Call Back Number:  [phone]  Additional Information:   States staying with friends and asking to send Rx to above pharmacy. Thanks!

## 2018-08-06 NOTE — TELEPHONE ENCOUNTER
Blood in urine expected after a procedure  He has a black in- what do you mean he has pain when he urinates?  He has pain in his bladder, around his urethra?   Blood around his urethra  Blood in his urine?

## 2018-08-06 NOTE — TELEPHONE ENCOUNTER
----- Message from Kylah Emmanuel sent at 8/3/2018  4:36 PM CDT -----  Contact: pt  Type: Needs Medical Advice    Who Called:  Antolin   Symptoms (please be specific):  n/a  How long has patient had these symptoms:  n/a  Pharmacy name and phone #:  n/a  Best Call Back Number:    Additional Information:  Pt is calling to speak with nurse regarding his procedure, he is experiencing complications on the site of surgery. Please call back and advise.

## 2018-08-06 NOTE — TELEPHONE ENCOUNTER
Will write for ditropan 10mg XL qda for bladder spasms until catheter comes out  He needs to be taking miralax daily (1 cap a day) while taking  Tell him to drink a LOT of water (4 bottles of water a day or more) to keep urine light pink to clear

## 2018-08-06 NOTE — TELEPHONE ENCOUNTER
Spoke with patient he states he is experiencing pain when he urinates. Patient states he still has blood in his urine. Please advise

## 2018-08-06 NOTE — TELEPHONE ENCOUNTER
Spoke with patient informed him of recommendations. Patient verbally voiced understanding. [facility] [address] [facility] [address]

## 2018-08-06 NOTE — TELEPHONE ENCOUNTER
Spoke with patient informed him of recommendations. Patient states it is more like a spasm and he has to hold on to something when it happens.

## 2018-08-09 ENCOUNTER — CLINICAL SUPPORT (OUTPATIENT)
Dept: UROLOGY | Facility: CLINIC | Age: 68
End: 2018-08-09
Payer: MEDICARE

## 2018-08-09 DIAGNOSIS — N40.0 BENIGN PROSTATIC HYPERPLASIA, UNSPECIFIED WHETHER LOWER URINARY TRACT SYMPTOMS PRESENT: Primary | ICD-10-CM

## 2018-08-09 PROCEDURE — 99024 POSTOP FOLLOW-UP VISIT: CPT | Mod: S$PBB,,, | Performed by: UROLOGY

## 2018-08-09 RX ORDER — METOPROLOL SUCCINATE 100 MG/1
100 TABLET, EXTENDED RELEASE ORAL DAILY
Qty: 90 TABLET | Refills: 3 | Status: SHIPPED | OUTPATIENT
Start: 2018-08-09 | End: 2018-09-25 | Stop reason: SDUPTHER

## 2018-08-09 RX ORDER — AMLODIPINE BESYLATE 10 MG/1
10 TABLET ORAL DAILY
Qty: 90 TABLET | Refills: 3 | Status: SHIPPED | OUTPATIENT
Start: 2018-08-09 | End: 2018-09-25 | Stop reason: SDUPTHER

## 2018-08-09 NOTE — PROGRESS NOTES
Pt taught how to dilate meatus with meatal dilator. He demonstrated himself.  He will do this twice a day until he returns to see me  pvr by scan: 61cc

## 2018-08-09 NOTE — PROGRESS NOTES
Patient arrived to clinic for a fill and pull. Patient has a 22FR 30ml black catheter with leg bag. Catheter bag attached 75ml of blood tinged urine. Patient's bladder filled with 170ml of sterile water until patient felt full. Catheter removed without difficulty with 40ml balloon deflated with fluid and black pulled. Patient was able to urinate 160ml of blood tinged urine in urine . Patient advised to increase fluid intake and return to office this afternoon for 3pm for pvr and for  to teach dilation.

## 2018-08-16 ENCOUNTER — TELEPHONE (OUTPATIENT)
Dept: UROLOGY | Facility: CLINIC | Age: 68
End: 2018-08-16

## 2018-08-16 NOTE — TELEPHONE ENCOUNTER
----- Message from Sd Hernandez sent at 8/16/2018  1:01 PM CDT -----  Contact: same  Patient called in and stated he had surgery last week and got a call from office & not exactly sure why.  Patient call back number is [phone]

## 2018-09-11 ENCOUNTER — TELEPHONE (OUTPATIENT)
Dept: UROLOGY | Facility: CLINIC | Age: 68
End: 2018-09-11

## 2018-09-11 NOTE — TELEPHONE ENCOUNTER
----- Message from Reyna Wesley sent at 9/11/2018 10:52 AM CDT -----  Contact: patient  Type: Needs Medical Advice    Who Called:  Patient  Symptoms (please be specific):    How long has patient had these symptoms:    Pharmacy name and phone #:    Best Call Back Number: [phone]  Additional Information: requesting to reschedule today's appointment,next availability showing 11/13,this is a post op appointment.;callback

## 2018-09-11 NOTE — TELEPHONE ENCOUNTER
Spoke with patient appointment rescheduled for Monday 9/17 at 8:45am. Patient verbally voiced understanding.

## 2018-09-17 ENCOUNTER — TELEPHONE (OUTPATIENT)
Dept: UROLOGY | Facility: CLINIC | Age: 68
End: 2018-09-17

## 2018-09-17 NOTE — TELEPHONE ENCOUNTER
----- Message from Luz Gloria sent at 9/17/2018  8:10 AM CDT -----  Contact: KRISTAL CASTILLO [[id number]]            Name of Who is Calling: KRISTAL CASTILLO [[id number]]    What is the request in detail: Pt is calling to get reschedule for either Thursday or Friday for his one month post op follow up.   Please contact pt to further discuss and advise.      Can the clinic reply by [facility]: No    [facility]: [phone]

## 2018-09-25 ENCOUNTER — APPOINTMENT (OUTPATIENT)
Dept: LAB | Facility: HOSPITAL | Age: 68
End: 2018-09-25
Attending: UROLOGY
Payer: MEDICARE

## 2018-09-25 ENCOUNTER — OFFICE VISIT (OUTPATIENT)
Dept: UROLOGY | Facility: CLINIC | Age: 68
End: 2018-09-25
Payer: MEDICARE

## 2018-09-25 ENCOUNTER — OFFICE VISIT (OUTPATIENT)
Dept: CARDIOLOGY | Facility: CLINIC | Age: 68
End: 2018-09-25
Payer: MEDICARE

## 2018-09-25 VITALS
OXYGEN SATURATION: 97 % | WEIGHT: 176.38 LBS | HEIGHT: 71 IN | DIASTOLIC BLOOD PRESSURE: 82 MMHG | RESPIRATION RATE: 15 BRPM | SYSTOLIC BLOOD PRESSURE: 122 MMHG | HEART RATE: 60 BPM | BODY MASS INDEX: 24.69 KG/M2

## 2018-09-25 VITALS
TEMPERATURE: 98 F | HEART RATE: 50 BPM | HEIGHT: 71 IN | WEIGHT: 177.94 LBS | SYSTOLIC BLOOD PRESSURE: 118 MMHG | DIASTOLIC BLOOD PRESSURE: 66 MMHG | BODY MASS INDEX: 24.91 KG/M2

## 2018-09-25 DIAGNOSIS — F17.200 CURRENT EVERY DAY SMOKER: Chronic | ICD-10-CM

## 2018-09-25 DIAGNOSIS — I10 HYPERTENSION, ESSENTIAL: ICD-10-CM

## 2018-09-25 DIAGNOSIS — R31.0 GROSS HEMATURIA: ICD-10-CM

## 2018-09-25 DIAGNOSIS — R39.89 BLADDER PAIN: ICD-10-CM

## 2018-09-25 DIAGNOSIS — I25.10 CORONARY ARTERY DISEASE INVOLVING NATIVE CORONARY ARTERY OF NATIVE HEART WITHOUT ANGINA PECTORIS: Primary | ICD-10-CM

## 2018-09-25 DIAGNOSIS — I71.40 ABDOMINAL AORTIC ANEURYSM (AAA) WITHOUT RUPTURE: ICD-10-CM

## 2018-09-25 DIAGNOSIS — N40.0 BENIGN PROSTATIC HYPERPLASIA, UNSPECIFIED WHETHER LOWER URINARY TRACT SYMPTOMS PRESENT: ICD-10-CM

## 2018-09-25 DIAGNOSIS — R82.89 ABNORMAL URINE CYTOLOGY: ICD-10-CM

## 2018-09-25 DIAGNOSIS — N39.41 URGE INCONTINENCE: ICD-10-CM

## 2018-09-25 DIAGNOSIS — N32.81 OAB (OVERACTIVE BLADDER): ICD-10-CM

## 2018-09-25 DIAGNOSIS — E78.2 MIXED HYPERLIPIDEMIA: ICD-10-CM

## 2018-09-25 LAB
AMORPH CRY URNS QL MICRO: ABNORMAL
BACTERIA #/AREA URNS HPF: ABNORMAL /HPF
BILIRUB UR QL STRIP: NEGATIVE
CLARITY UR: ABNORMAL
COLOR UR: YELLOW
GLUCOSE UR QL STRIP: NEGATIVE
HGB UR QL STRIP: ABNORMAL
HYALINE CASTS #/AREA URNS LPF: 0 /LPF
KETONES UR QL STRIP: NEGATIVE
LEUKOCYTE ESTERASE UR QL STRIP: ABNORMAL
MICROSCOPIC COMMENT: ABNORMAL
NITRITE UR QL STRIP: NEGATIVE
PH UR STRIP: 6 [PH] (ref 5–8)
PROT UR QL STRIP: ABNORMAL
RBC #/AREA URNS HPF: 5 /HPF (ref 0–4)
SP GR UR STRIP: >=1.03 (ref 1–1.03)
SQUAMOUS #/AREA URNS HPF: 1 /HPF
URN SPEC COLLECT METH UR: ABNORMAL
UROBILINOGEN UR STRIP-ACNC: NEGATIVE EU/DL
WBC #/AREA URNS HPF: 15 /HPF (ref 0–5)

## 2018-09-25 PROCEDURE — 81000 URINALYSIS NONAUTO W/SCOPE: CPT

## 2018-09-25 PROCEDURE — 99999 PR PBB SHADOW E&M-EST. PATIENT-LVL III: CPT | Mod: PBBFAC,,, | Performed by: INTERNAL MEDICINE

## 2018-09-25 PROCEDURE — 99213 OFFICE O/P EST LOW 20 MIN: CPT | Mod: PBBFAC,25 | Performed by: INTERNAL MEDICINE

## 2018-09-25 PROCEDURE — 88112 CYTOPATH CELL ENHANCE TECH: CPT | Performed by: PATHOLOGY

## 2018-09-25 PROCEDURE — 99024 POSTOP FOLLOW-UP VISIT: CPT | Mod: S$PBB,,, | Performed by: UROLOGY

## 2018-09-25 PROCEDURE — 81001 URINALYSIS AUTO W/SCOPE: CPT | Mod: PBBFAC,PN | Performed by: UROLOGY

## 2018-09-25 PROCEDURE — 99215 OFFICE O/P EST HI 40 MIN: CPT | Mod: PBBFAC,27,PN | Performed by: UROLOGY

## 2018-09-25 PROCEDURE — 87086 URINE CULTURE/COLONY COUNT: CPT

## 2018-09-25 PROCEDURE — 99999 PR PBB SHADOW E&M-EST. PATIENT-LVL V: CPT | Mod: PBBFAC,,, | Performed by: UROLOGY

## 2018-09-25 PROCEDURE — 99214 OFFICE O/P EST MOD 30 MIN: CPT | Mod: S$PBB,,, | Performed by: INTERNAL MEDICINE

## 2018-09-25 RX ORDER — METOPROLOL SUCCINATE 100 MG/1
100 TABLET, EXTENDED RELEASE ORAL DAILY
Qty: 90 TABLET | Refills: 3 | Status: SHIPPED | OUTPATIENT
Start: 2018-09-25 | End: 2019-12-13 | Stop reason: SDUPTHER

## 2018-09-25 RX ORDER — OXYBUTYNIN CHLORIDE 10 MG/1
10 TABLET, EXTENDED RELEASE ORAL DAILY
Qty: 90 TABLET | Refills: 0 | Status: SHIPPED | OUTPATIENT
Start: 2018-09-25 | End: 2019-03-21 | Stop reason: ALTCHOICE

## 2018-09-25 RX ORDER — ATORVASTATIN CALCIUM 40 MG/1
40 TABLET, FILM COATED ORAL NIGHTLY
Qty: 90 TABLET | Refills: 3 | Status: SHIPPED | OUTPATIENT
Start: 2018-09-25 | End: 2019-12-13 | Stop reason: SDUPTHER

## 2018-09-25 RX ORDER — AMLODIPINE BESYLATE 10 MG/1
10 TABLET ORAL DAILY
Qty: 90 TABLET | Refills: 3 | Status: SHIPPED | OUTPATIENT
Start: 2018-09-25 | End: 2019-10-30 | Stop reason: SDUPTHER

## 2018-09-25 NOTE — PROGRESS NOTES
CARDIOVASCULAR PROGRESS NOTE    REASON FOR CONSULT:   Antolin Richardson is a 67 y.o. male who presents for follow up of CAD, HTN, AAA repair.      HISTORY OF PRESENT ILLNESS:   The patient presents for follow-up.  He reports generally stable status without angina or dyspnea.  There's been no palpitations, lightheadedness, dizziness, or syncope.  He denies PND, orthopnea, or lower extremity edema.  There's been no melena, or claudicant symptoms.  He does describe some hematuria after a recent prostate procedure, and follow-up is planned with Urology later on today.  Unfortunately, the patient does continue to smoke cigarettes, and I have again strongly encouraged him to quit smoking.      CARDIOVASCULAR HISTORY:   AAA s/p endo repair 12/2016 (Fela/Gerda)  CAD hx PCI 2009    PAST MEDICAL HISTORY:     Past Medical History:   Diagnosis Date    AAA (abdominal aortic aneurysm) 12/2016    s/p endo repair (Fela)    Acid reflux     Anxiety     Coronary artery disease     Hypertension     Myocardial infarction        PAST SURGICAL HISTORY:     Past Surgical History:   Procedure Laterality Date    ABDOMINAL AORTIC ANEURYSM REPAIR      APPENDECTOMY      CORONARY ANGIOPLASTY  2009    coronary artery stent      x1    CYSTOSCOPY N/A 4/2/2018    [facility]    CYSTOSCOPY  3/26/2018    [facility]    REPAIR ABDOMINAL-AORTIC ANEURYSM-ENDOVASCULAR GRAFT (AAA) (C-ARM) N/A 12/12/2016    [facility]    stent for abdominal aneurysm      TRANSRECTAL ULTRASOUND N/A 4/2/2018    [facility]    TRANSRECTAL ULTRASOUND  3/26/2018    [facility]    TRANSURETHRAL RESECTION OF PROSTATE N/A 8/1/2018    Procedure: TRANSURETHRAL RESECTION OF PROSTATE (TURP);  Surgeon: Poly Marcum MD;  Location: [facility];  Service:  Urology;  Laterality: N/A;    TRANSURETHRAL RESECTION OF PROSTATE (TURP) N/A 8/1/2018    [facility]       ALLERGIES AND MEDICATION:   Review of patient's allergies indicates:  No Known Allergies       Medication List           Accurate as of 9/25/18  9:21 AM. If you have any questions, ask your nurse or doctor.               CONTINUE taking these medications    amLODIPine 10 MG tablet  Commonly known as:  NORVASC  Take 1 tablet (10 mg total) by mouth once daily.     aspirin 81 MG EC tablet  Commonly known as:  ECOTRIN  Take 1 tablet (81 mg total) by mouth once daily.     atorvastatin 40 MG tablet  Commonly known as:  LIPITOR  Take 1 tablet (40 mg total) by mouth every evening.     finasteride 5 mg tablet  Commonly known as:  PROSCAR  Take 1 tablet (5 mg total) by mouth once daily.     metoprolol succinate 100 MG 24 hr tablet  Commonly known as:  TOPROL-XL  Take 1 tablet (100 mg total) by mouth once daily.     oxybutynin 10 MG 24 hr tablet  Commonly known as:  DITROPAN-XL  Take 1 tablet (10 mg total) by mouth once daily. for 10 days     polyethylene glycol 17 gram/dose powder  Commonly known as:  GLYCOLAX  1/2 to 1 cap daily to prevent constipation on pain meds     tamsulosin 0.4 mg Cap  Commonly known as:  FLOMAX  Take 2 capsules (0.8 mg total) by mouth after dinner.        STOP taking these medications    sildenafil 100 MG tablet  Commonly known as:  VIAGRA  Stopped by:  Gustavo Michael MD              SOCIAL HISTORY:     Social History     Socioeconomic History    Marital status:      Spouse name: Not on file    Number of children: Not on file    Years of education: Not on file    Highest education level: Not on file   Social Needs    Financial resource strain: Not on file    Food insecurity - worry: Not on file    Food insecurity - inability: Not on file    Transportation needs - medical: Not on file    Transportation needs - non-medical: Not on file  "  Occupational History    Not on file   Tobacco Use    Smoking status: Current Every Day Smoker     Packs/day: 2.00     Years: 20.00     Pack years: 40.00    Smokeless tobacco: Never Used   Substance and Sexual Activity    Alcohol use: Yes     Comment: occasionally    Drug use: No    Sexual activity: Yes     Partners: Female   Other Topics Concern    Not on file   Social History Narrative    Not on file       FAMILY HISTORY:     Family History   Problem Relation Age of Onset    Cancer Mother         brain    Dementia Father         alzheimer's    Hypertension Father        REVIEW OF SYSTEMS:   Review of Systems   Constitutional: Negative for chills, diaphoresis and fever.   HENT: Negative for nosebleeds.    Eyes: Negative for blurred vision, double vision and photophobia.   Respiratory: Negative for hemoptysis, shortness of breath and wheezing.    Cardiovascular: Negative for chest pain, palpitations, orthopnea, claudication, leg swelling and PND.   Gastrointestinal: Negative for abdominal pain, blood in stool, heartburn, melena, nausea and vomiting.   Genitourinary: Positive for hematuria. Negative for flank pain.   Musculoskeletal: Negative for falls, joint pain, myalgias and neck pain.   Skin: Negative for rash.   Neurological: Negative for dizziness, tingling, seizures, loss of consciousness, weakness and headaches.   Endo/Heme/Allergies: Negative for polydipsia. Does not bruise/bleed easily.   Psychiatric/Behavioral: Negative for depression and memory loss. The patient is not nervous/anxious.        PHYSICAL EXAM:     Vitals:    09/25/18 0855   BP: 122/82   Pulse: 60   Resp: 15    Body mass index is 24.6 kg/m².  Weight: 80 kg (176 lb 5.9 oz)   Height: 5' 11" (180.3 cm)     Physical Exam   Constitutional: He is oriented to person, place, and time. He appears well-developed and well-nourished. He is cooperative.  Non-toxic appearance. No distress.   HENT:   Head: Normocephalic and atraumatic.   Eyes: " Conjunctivae and EOM are normal. Pupils are equal, round, and reactive to light. No scleral icterus.   Neck: Trachea normal and normal range of motion. Neck supple. Normal carotid pulses and no JVD present. Carotid bruit is not present. No neck rigidity. No tracheal deviation and no edema present. No thyromegaly present.   Cardiovascular: Normal rate, regular rhythm, S1 normal and S2 normal. PMI is not displaced. Exam reveals no gallop and no friction rub.   No murmur heard.  Pulses:       Carotid pulses are 2+ on the right side, and 2+ on the left side.  Pulmonary/Chest: Effort normal and breath sounds normal. No stridor. No respiratory distress. He has no wheezes. He has no rales. He exhibits no tenderness.   Abdominal: Soft. He exhibits no distension. There is no hepatosplenomegaly.   Musculoskeletal: He exhibits no edema or tenderness.   Feet:   Right Foot:   Skin Integrity: Negative for ulcer.   Left Foot:   Skin Integrity: Negative for ulcer.   Neurological: He is alert and oriented to person, place, and time. No cranial nerve deficit.   Skin: Skin is warm and dry. No rash noted. No erythema.   Psychiatric: He has a normal mood and affect. His speech is normal and behavior is normal.   Vitals reviewed.      DATA:   EKG: (personally reviewed tracing)  7/27/18 SR 51    Laboratory:  CBC:  Recent Labs   Lab  12/13/16   0350  07/30/18   0840  08/02/18   0517   WHITE BLOOD CELL COUNT  8.16  7.90  14.20 H   HEMOGLOBIN  12.8 L  15.6  13.5 L   HEMATOCRIT  38.5 L  47.2  39.8 L   PLATELETS  149 L  224  217       CHEMISTRIES:  Recent Labs   Lab  12/14/16   0407   11/20/17   0754  07/30/18   0840  08/02/18   0517   GLUCOSE  104   < >  102  111 H  186 H   SODIUM  137   < >  142  141  139   POTASSIUM  3.8   < >  4.2  3.9  4.0   BUN BLD  11   < >  15  15  16   CREATININE  1.0   < >  1.3  1.3  1.2  1.1   EGFR IF   >60   < >  >60  >60  >60  >60   EGFR IF NON-  >60   < >  58 A  58 A  >60  >60    CALCIUM  8.9   < >  9.6  9.5  8.6 L   MAGNESIUM  2.0   --    --    --    --     < > = values in this interval not displayed.       CARDIAC BIOMARKERS:        COAGS:        LIPIDS/LFTS:  Recent Labs   Lab  12/13/16   0349  04/17/17   0740  11/20/17   0754  07/30/18   0840   CHOLESTEROL  171  141  143   --    TRIGLYCERIDES  219 H  140  129   --    HDL  23 L  29 L  37 L   --    LDL CHOLESTEROL  104.2  84.0  80.2   --    NON-HDL CHOLESTEROL  148  112  106   --    AST  22  26  23  23   ALT  28  33  28  30       Cardiovascular Testing:  CTA abd/pel 11/20/17  Interval placement of aortobiiliac endovascular stent graft which is patent with no extravasation and with partial collapse of the native surrounding aneurysm sac.  Additional findings as detailed above including enlarged prostate gland, diverticulosis without CT findings of acute diverticulitis, left renal masses suggesting low density and high density cysts.    Ex MPI 1/10/17  The patient exercised for 5.5 minutes on a High Ramp protocol, corresponding to a functional capacity of 7 estimated METS, achieving a peak heart rate of 151 bpm, which is 97% of the age predicted maximum heart rate. -CP/EKG.  Nuclear Quantitative Functional Analysis:   LVEF: 49 %  Impression: NORMAL MYOCARDIAL PERFUSION  1. The perfusion scan is free of evidence for myocardial ischemia or injury.   2. There is a mild intensity fixed defect in the inferior wall of the left ventricle, secondary to diaphragm attenuation.   3. Resting wall motion is physiologic.   4. There is resting LV dysfunction with a reduced ejection fraction of 49 %.   5. The ventricular volumes are normal at rest and stress.   6. The extracardiac distribution of radioactivity is normal.   7. When compared to the previous study from 03/11/2014, no change    LE art US 1/9/17  1.  With regards to the right lower extremity there is no evidence of hemodynamically significant peripheral vascular disease. LASHAUN 1.07  2.  With  regards to the left lower extremity there is no evidence of hemodynamically significant peripheral vascular disease. LASHAUN 1.08    Stress Test: L MPI 3/11/14  Nuclear Quantitative Functional Analysis:   LVEF: >= 70 %  Impression: NORMAL MYOCARDIAL PERFUSION  1. The perfusion scan is free of evidence for myocardial ischemia or injury.   2. There is a moderate intensity fixed defect in the inferior wall of the left ventricle, secondary to diaphragm attenuation.   3. Resting wall motion is physiologic.   4. Resting LV function is normal.   5. The ventricular volumes are normal at rest and stress.   6. The extracardiac distribution of radioactivity is normal.     ASSESSMENT:   # AAA s/p endovasc repair 12/2016 (Fela/Gerda)  # HTN, controlled  # CAD, hx of distant PCI, MPI 1/2017 normal  # HLP, on atorva 40mg  # tob abuse, pt again strongly encouraged to quit.  He has previously been enrolled in the smoking class.    PLAN:   Cont med rx  Quit smoking, referred again to [facility] smoking cessation program  Follow up planned with Dr. Chahal re: AAA repair  RTC 6 months    Gustavo Michael MD, FACC

## 2018-09-25 NOTE — PROGRESS NOTES
[facility] Note - [place]  Staff: MD Trixie    Referring provider and please cc: self  PC[facility]     Chief Complaint: gross hematuria and frequency    Subjective:        HPI: Antolin Richardson is a 67 y.o. male presents with     OAB, BPH, recurrent uti/prostatitis.   -he initially came to see me 2/27/18 for frequency q20 to 30 minutes, weak stream and sometimes has sprayed stream. He had never been on any meds for his prostate and denied any hesistancy. He states that the sx had occurred since surgery (AAA repair and had catheter during this surgery) in November however CTA shows a very enlarged prostate. 6x5cm in transverse with large intravesical lobe. He also drinks 5 cups of coffee in the morning and 2 cups of tea in the pm. I started him on flomax 0.4mg nightly and he had a uroflow 3/26/18 which showed a mean flow of 6cc with voided volume of 70 and pvr of 17. I did a cysto trus on 4/2/18 which showed a 99g prostate with a large circumferential median lobe. PSA was initially 3.7 and repated it and it came down to 3.0 with 30% free. I referred him to  to discuss robotic simple prostatectomy however he was worried insurance would not cover visit and did not go to appt. He returns today and states that he has burning with urination, oab and denies incomplete emptying. He has had prostatitis/uti at least twice now but has never been on abx for longer than 10 days. Repeated uroflow today but not evnough voided volume (59cc). pvr by in and out cath today: 30cc    AUA ssx 6/21/18:(5 incomplete emptying, 5 frequency, 5 intermittency, 5 urgency, 5 weak stream, 0 straining, 5 sleeping). 30. QOL: unhappy  -underwent meatotomy, cysto b rgp and turp on 8/1/18. Path negative. 36g resected.   -postop 8/9/18 pvr was 61cc. Taught how to dilate meatus.  -returns today and states that he's been having oab with frequency q20 to 30 minutes which is not new but with  some urgency and urge incontinence which is new since surgery. He has been dilating 2x a day. He has discontinued finasteride and is still on flomax. He states he's been having pain at the end of urination in his lower abdomen and he is still on abx.     In and out cath today done with 16fr coude but had meatal stricture recurrence. I had to push through this. Then dilated with a 16fr and then 18fr. I showed him where it was catching. Residual 20cc.      psa history  8/1/18  Turp chips: 36g  4/2/18              trus volume: 99g  3/19/18            3.0, %free 29.67  2/27/18            3.8, % free 31.35, Marlon: no nodules, 40+g prostate     Gross hematuria  -he's had blood in his urine intermittently since February which is what brought him to see me as he had never had GH before. The last time was just a few days ago. Workup reveals likely from prostate.   -On asa 81mg daily. Circumcised. Denies any new frequency that is new or worse, denies any dysuria.  -cta 11/2017 shows no stones.   -cytology 2/27/18: negative, +tobacco use (1.5 ppd x 50 years and still smokes). Repeat cytology 6/21/18: negative for malignancy, rare atypical cells  -cystoscopy trus 4/2/18: 99g prostate, no tumors.    Denies any recurrent gross hematuria. Back on blood thinners.      ua today void: 1.025/5/1+leukocyte/100 protein/100 glucose/250 blood  ua cath: sent for urinalysis and culture and cytology   ua history:   7/25/18            Ng, cath: negative  7/9/18              Ng, void: 3+blood/tr protein  6/21/18            Coag neg staph, void and cath: nit+/3+blood/1+leuk, pvr I&0: 30cc, cytology: rare atypical cells, neg for malignancy.   4/2/18              No cx, void: 1+leuk and blood   3/31/18            Ng, void: negative  3/26/18            Coag neg staph, void: 2+ wbc, nit+, 50 blood - bactrim twice a day for 10 days  3/19/18            Ng, void: 1+ blooda  2/27/18            Multiple org, void: 1+wbc, tr blood (bactrim x 7d), cytology:  negative   ua today +, c/o burning     ED (did not discuss today)  -occ problems with erections.   -uses viagra  -IIEF: 13cc    REVIEW OF SYSTEMS:  General ROS: no fevers, no chills  Psychological ROS: no depression  Endocrine ROS: no heat or cold  Respiratory ROS: no SOB  Cardiovascular ROS: no CP  Gastrointestinal ROS: no abdominal pain, no constipation, no diarrhea, no BRBPR  Musculoskeletal ROS: no muscle pain  Neurological ROS: no headaches  Dermatological ROS: no rashes  HEENT: noglasses, no sinus   ROS: per HPI     PMHx:  Past Medical History:   Diagnosis Date    AAA (abdominal aortic aneurysm) 12/2016    s/p endo repair (Fela)    Acid reflux     Anxiety     Coronary artery disease     Coronary artery disease involving native coronary artery of native heart without angina pectoris 1/3/2017    Hypertension     Myocardial infarction      Kidney stones: No  Cataracts? none    PSHx:  Past Surgical History:   Procedure Laterality Date    ABDOMINAL AORTIC ANEURYSM REPAIR      APPENDECTOMY      CORONARY ANGIOPLASTY  2009    coronary artery stent      x1    CYSTOSCOPY N/A 4/2/2018    [facility]    CYSTOSCOPY  3/26/2018    [facility]    REPAIR ABDOMINAL-AORTIC ANEURYSM-ENDOVASCULAR GRAFT (AAA) (C-ARM) N/A 12/12/2016    [facility]    stent for abdominal aneurysm      TRANSRECTAL ULTRASOUND N/A 4/2/2018    [facility]    TRANSRECTAL ULTRASOUND  3/26/2018    [facility]    TRANSURETHRAL RESECTION OF PROSTATE N/A 8/1/2018    Procedure: TRANSURETHRAL RESECTION OF PROSTATE (TURP);  Surgeon: Poly Marcum MD;  Location: [facility] OR;  Service: Urology;  Laterality: N/A;    TRANSURETHRAL RESECTION OF PROSTATE (TURP) N/A 8/1/2018    [facility] by [facility] at [facility] OR      Stents/Valves/Foreign Bodies: Yes - last placed in   Cardiac Evaluation: Yes -     Screening Studies  Colonoscopy: 2017 found polyps    Fam Hx:   malignancies: No  . Gyn malignancies: no. Parents  and 80s and mother had brain cancer  kidney stones: No     Soc Hx:  +tobacco.  1.5 pk per day x 50 year  occ alcohol  Lives in [place]  :   Children: 3  Occupation:retired from security    Allergies:  Lisinopril    Urologic meds: flomax 0.4mg nightly, finasteride  Anticoagulation: Yes - asa 81mg daily     Objective:     Vitals:    18 1328   BP: 118/66   Pulse: (!) 50   Temp: 97.8 °F (36.6 °C)         General:WDWN in NAD  Eyes: PERRLA, normal conjunctiva  Respiratory: No increased work on breathing.   Cardiovascular: No obvious extremity edema. Warm and well perfused.   GI: palpation of masses. No tenderness. No hepatosplenomegaly to palpation.  Musculoskeletal: normal range of motion of bilateral upper extremities. Normal muscle strength and tone.  Skin: no obvious rashes or lesions. No tightening of skin noted.  Neurologic: CN grossly normal. Normal sensation.   Psychiatric: awake, alert and oriented x 3. Mood and affect normal. Cooperative.    :  Penis is circumcised,    Last MONE 18: 40g gland without masses, tenderness. SV not palpable. Normal sphincter tone. No hemhorroids.      pvr by in and out cath today: 30cc - sent for ua,culture, cystology     LABS REVIEW:    Cr:   Lab Results   Component Value Date    CREATININE 1.1 2018         PATHOLOGY REVIEW:  Urine, catheterized 18  Negative for malignancy.  Rare atypical cells present.  Background of rbc's, bacteria and acute inflammatory cells.  Correlate clinically.    Urine 17: Negative for malignant cells. Will discuss at f/u    RADIOGRAPHIC REVIEW:  cta 17 images reviewed by me  -no stones  -enlarged prostate    Interval placement of aortobiiliac endovascular stent graft which is patent with no  extravasation and with partial collapse of the native surrounding aneurysm sac.    Additional findings as detailed above including enlarged prostate gland, diverticulosis without CT findings of acute diverticulitis, left renal masses suggesting low density and high density cysts.    Assessment:       1. Benign prostatic hyperplasia, unspecified whether lower urinary tract symptoms present    2. Gross hematuria    3. Abnormal urine cytology    4. OAB (overactive bladder)    5. Meatal stenosis    6. Bladder pain    7. Urge incontinence          Plan:     BPH with LuTS and recurrent prostatitis/uti s/p turp 8/1/18 with OAB (present prior) and UUI (new)  -discontinue flomax  -discontinue finasteride  -discontinue antibiotics  -start ditropan/oxybutynin 10mg daily for overactive bladder. I expect this to resolve/improve in the next 3 months with meatal dilation   -send catherized urine for urinalysis and culture, In and out cath today done see hpi. Meatal stricture recurrence. He is not pushing it far enough and I suspect he was having pain from this.  -repeat cysto on oct 22nd and see if any residual tissue needs to be resected and repeat meatal dilation    Gross hematuria and abnormal urine cytology, chronic OAB  -already had a cta in 11/2017  -urine cytology (h/o smoking) 2/2018 negative, 6/2018 cytology showed atypical cells but likely inflammation. Repeat cytology   -cysto trus was negative 4/2018   -repeat cytoscopy on oct 22        Poly Marcum MD

## 2018-09-25 NOTE — PATIENT INSTRUCTIONS
Dilate meatus every day or every other day if going well  Push at least 4 inches  Start ditropan  Discontinue all the other medicines-flomax, finasteride'    Return on October 22nd and see if any more tissue needs to be resected.

## 2018-09-27 ENCOUNTER — CLINICAL SUPPORT (OUTPATIENT)
Dept: SMOKING CESSATION | Facility: CLINIC | Age: 68
End: 2018-09-27
Payer: COMMERCIAL

## 2018-09-27 DIAGNOSIS — F17.200 NICOTINE DEPENDENCE: Primary | ICD-10-CM

## 2018-09-27 LAB — BACTERIA UR CULT: NO GROWTH

## 2018-09-27 PROCEDURE — 99999 PR PBB SHADOW E&M-EST. PATIENT-LVL I: CPT | Mod: PBBFAC,,,

## 2018-09-27 PROCEDURE — 99404 PREV MED CNSL INDIV APPRX 60: CPT | Mod: S$GLB,,,

## 2018-09-27 RX ORDER — VARENICLINE TARTRATE 0.5 (11)-1
KIT ORAL
Qty: 1 PACKAGE | Refills: 0 | Status: SHIPPED | OUTPATIENT
Start: 2018-09-27 | End: 2018-10-08

## 2018-09-27 RX ORDER — IBUPROFEN 200 MG
1 TABLET ORAL DAILY
Qty: 14 PATCH | Refills: 0 | Status: SHIPPED | OUTPATIENT
Start: 2018-09-27 | End: 2018-10-11

## 2018-09-27 NOTE — Clinical Note
Patient will be participating in biweekly tobacco cessation meetings and will begin the prescribed tobacco cessation medication regime of Chantix starter pack  and 21 mg nicotine patch QD  Patient denies any low moods or SI/HI .  Patient has used Chantix on a previous quit attempt with out s/e.  He currently smokes 40 cigarettes per day.  Pt started on rate reduction and wait time of 15 min prior to smoking. Pt's exhaled carbon monoxide level was 21*  ppm as per Smokerlyzer. (non- smoker = 0-5 ppm.) Will see pt back in office in 2 weeks.

## 2018-09-28 ENCOUNTER — OFFICE VISIT (OUTPATIENT)
Dept: FAMILY MEDICINE | Facility: CLINIC | Age: 68
End: 2018-09-28
Payer: MEDICARE

## 2018-09-28 ENCOUNTER — LAB VISIT (OUTPATIENT)
Dept: LAB | Facility: HOSPITAL | Age: 68
End: 2018-09-28
Attending: INTERNAL MEDICINE
Payer: MEDICARE

## 2018-09-28 ENCOUNTER — TELEPHONE (OUTPATIENT)
Dept: UROLOGY | Facility: CLINIC | Age: 68
End: 2018-09-28

## 2018-09-28 VITALS
BODY MASS INDEX: 24.47 KG/M2 | WEIGHT: 174.81 LBS | DIASTOLIC BLOOD PRESSURE: 70 MMHG | HEART RATE: 56 BPM | HEIGHT: 71 IN | OXYGEN SATURATION: 98 % | SYSTOLIC BLOOD PRESSURE: 104 MMHG

## 2018-09-28 DIAGNOSIS — M79.672 FOOT PAIN, BILATERAL: Primary | ICD-10-CM

## 2018-09-28 DIAGNOSIS — G62.9 NEUROPATHY: ICD-10-CM

## 2018-09-28 DIAGNOSIS — D53.9 NUTRITIONAL ANEMIA: ICD-10-CM

## 2018-09-28 DIAGNOSIS — R73.03 PREDIABETES: ICD-10-CM

## 2018-09-28 DIAGNOSIS — M79.671 FOOT PAIN, BILATERAL: Primary | ICD-10-CM

## 2018-09-28 DIAGNOSIS — Z12.11 ENCOUNTER FOR SCREENING COLONOSCOPY: ICD-10-CM

## 2018-09-28 DIAGNOSIS — R79.9 ABNORMAL FINDING OF BLOOD CHEMISTRY: ICD-10-CM

## 2018-09-28 DIAGNOSIS — Z23 NEEDS FLU SHOT: ICD-10-CM

## 2018-09-28 LAB
ALBUMIN SERPL BCP-MCNC: 4.1 G/DL
ALP SERPL-CCNC: 65 U/L
ALT SERPL W/O P-5'-P-CCNC: 25 U/L
ANION GAP SERPL CALC-SCNC: 11 MMOL/L
AST SERPL-CCNC: 24 U/L
BASOPHILS # BLD AUTO: 0.05 K/UL
BASOPHILS NFR BLD: 0.5 %
BILIRUB SERPL-MCNC: 0.7 MG/DL
BUN SERPL-MCNC: 13 MG/DL
CALCIUM SERPL-MCNC: 9.3 MG/DL
CHLORIDE SERPL-SCNC: 108 MMOL/L
CO2 SERPL-SCNC: 22 MMOL/L
CREAT SERPL-MCNC: 1.2 MG/DL
DIFFERENTIAL METHOD: NORMAL
EOSINOPHIL # BLD AUTO: 0.2 K/UL
EOSINOPHIL NFR BLD: 1.5 %
ERYTHROCYTE [DISTWIDTH] IN BLOOD BY AUTOMATED COUNT: 14.4 %
EST. GFR  (AFRICAN AMERICAN): >60 ML/MIN/1.73 M^2
EST. GFR  (NON AFRICAN AMERICAN): >60 ML/MIN/1.73 M^2
GLUCOSE SERPL-MCNC: 84 MG/DL
HCT VFR BLD AUTO: 47.8 %
HGB BLD-MCNC: 16.2 G/DL
LYMPHOCYTES # BLD AUTO: 3 K/UL
LYMPHOCYTES NFR BLD: 26.9 %
MCH RBC QN AUTO: 29.3 PG
MCHC RBC AUTO-ENTMCNC: 33.9 G/DL
MCV RBC AUTO: 87 FL
MONOCYTES # BLD AUTO: 1 K/UL
MONOCYTES NFR BLD: 9.4 %
NEUTROPHILS # BLD AUTO: 6.8 K/UL
NEUTROPHILS NFR BLD: 61.4 %
PLATELET # BLD AUTO: 279 K/UL
PMV BLD AUTO: 10 FL
POTASSIUM SERPL-SCNC: 4.1 MMOL/L
PROT SERPL-MCNC: 7.5 G/DL
RBC # BLD AUTO: 5.52 M/UL
SODIUM SERPL-SCNC: 141 MMOL/L
TSH SERPL DL<=0.005 MIU/L-ACNC: 0.41 UIU/ML
WBC # BLD AUTO: 11 K/UL

## 2018-09-28 PROCEDURE — 36415 COLL VENOUS BLD VENIPUNCTURE: CPT | Mod: PO

## 2018-09-28 PROCEDURE — 99999 PR PBB SHADOW E&M-EST. PATIENT-LVL III: CPT | Mod: PBBFAC,,, | Performed by: INTERNAL MEDICINE

## 2018-09-28 PROCEDURE — 80053 COMPREHEN METABOLIC PANEL: CPT

## 2018-09-28 PROCEDURE — 99204 OFFICE O/P NEW MOD 45 MIN: CPT | Mod: S$PBB,,, | Performed by: INTERNAL MEDICINE

## 2018-09-28 PROCEDURE — 99213 OFFICE O/P EST LOW 20 MIN: CPT | Mod: PBBFAC,PO | Performed by: INTERNAL MEDICINE

## 2018-09-28 PROCEDURE — 83036 HEMOGLOBIN GLYCOSYLATED A1C: CPT

## 2018-09-28 PROCEDURE — 86038 ANTINUCLEAR ANTIBODIES: CPT

## 2018-09-28 PROCEDURE — 84443 ASSAY THYROID STIM HORMONE: CPT

## 2018-09-28 PROCEDURE — 84425 ASSAY OF VITAMIN B-1: CPT

## 2018-09-28 PROCEDURE — 90662 IIV NO PRSV INCREASED AG IM: CPT | Mod: PBBFAC,PO

## 2018-09-28 PROCEDURE — 85025 COMPLETE CBC W/AUTO DIFF WBC: CPT

## 2018-09-28 PROCEDURE — 82607 VITAMIN B-12: CPT

## 2018-09-28 PROCEDURE — 82746 ASSAY OF FOLIC ACID SERUM: CPT

## 2018-09-28 RX ORDER — GABAPENTIN 300 MG/1
300 CAPSULE ORAL NIGHTLY
Qty: 30 CAPSULE | Refills: 0 | Status: SHIPPED | OUTPATIENT
Start: 2018-09-28 | End: 2019-11-02 | Stop reason: SDUPTHER

## 2018-09-28 NOTE — TELEPHONE ENCOUNTER
----- Message from Tello Hopper sent at 9/28/2018  3:40 PM CDT -----  Contact: patient  Antolin, [phone]. Requesting to speak with a nurse. Please advise. Thanks.

## 2018-09-28 NOTE — PROGRESS NOTES
Administered High Dose Flu vaccine IM to left deltoid.  Patient tolerated injection well.  Patient advised to wait in lobby for 15 minutes for observation and to report any adverse reactions immediately.  Patient verbalized understanding.

## 2018-09-28 NOTE — PROGRESS NOTES
SUBJECTIVE     Chief Complaint   Patient presents with    Establish Care    Foot Pain     Bilateral bottom of foot pain x couple months       HPI  Antolin Richardson is a 67 y.o. male with multiple medical diagnoses as listed in the medical history and problem list that presents for establishment of care. Pt was last seen by his PCP on the [facility] ~1.5 years ago. He has been doing okay since his last visit. He is fully compliant with all meds and denies any adverse side effects. He is also followed by Urology for BPH s/p TURP and he is followed by Cardiology for CAD. He complains of B/L foot pain at the plantar surface for the past couple months. Pain is aching and burning at a 6-7/10 and constant in nature. He has not taken any meds for the symptoms. His wife massages the foot with good relief of pain.    PAST MEDICAL HISTORY:  Past Medical History:   Diagnosis Date    AAA (abdominal aortic aneurysm) 12/2016    s/p endo repair ([place])    Acid reflux     Anxiety     Coronary artery disease     Coronary artery disease involving native coronary artery of native heart without angina pectoris 1/3/2017    Hypertension     Myocardial infarction        PAST SURGICAL HISTORY:  Past Surgical History:   Procedure Laterality Date    ABDOMINAL AORTIC ANEURYSM REPAIR      APPENDECTOMY      CORONARY ANGIOPLASTY  2009    coronary artery stent      x1    CYSTOSCOPY N/A 4/2/2018    [facility]    CYSTOSCOPY  3/26/2018    [facility]    REPAIR ABDOMINAL-AORTIC ANEURYSM-ENDOVASCULAR GRAFT (AAA) (C-ARM) N/A 12/12/2016    [facility]al [facility] OR    stent for abdominal aneurysm      TRANSRECTAL ULTRASOUND N/A 4/2/2018    [facility]    TRANSRECTAL ULTRASOUND  3/26/2018    [facility]    TRANSURETHRAL RESECTION OF PROSTATE N/A 8/1/2018     Procedure: TRANSURETHRAL RESECTION OF PROSTATE (TURP);  Surgeon: Poly Marcum MD;  Location: [facility] OR;  Service: Urology;  Laterality: N/A;    TRANSURETHRAL RESECTION OF PROSTATE (TURP) N/A 8/1/2018    [facility]formed by [facility] at [facility] OR       SOCIAL HISTORY:  Social History     Socioeconomic History    Marital status:      Spouse name: Not on file    Number of children: Not on file    Years of education: Not on file    Highest education level: Not on file   Social Needs    Financial resource strain: Not on file    Food insecurity - worry: Not on file    Food insecurity - inability: Not on file    Transportation needs - medical: Not on file    Transportation needs - non-medical: Not on file   Occupational History    Not on file   Tobacco Use    Smoking status: Current Every Day Smoker     Packs/day: 2.00     Years: 20.00     Pack years: 40.00    Smokeless tobacco: Never Used   Substance and Sexual Activity    Alcohol use: Yes     Comment: occasionally    Drug use: No    Sexual activity: Yes     Partners: Female   Other Topics Concern    Not on file   Social History Narrative    Not on file       FAMILY HISTORY:  Family History   Problem Relation Age of Onset    Cancer Mother         brain    Dementia Father         alzheimer's    Hypertension Father        ALLERGIES AND MEDICATIONS: updated and reviewed.  Review of patient's allergies indicates:   Allergen Reactions    Lisinopril Itching     Current Outpatient Medications   Medication Sig Dispense Refill    amLODIPine (NORVASC) 10 MG tablet Take 1 tablet (10 mg total) by mouth once daily. 90 tablet 3    aspirin (ECOTRIN) 81 MG EC tablet Take 1 tablet (81 mg total) by mouth once daily. 90 tablet 3    atorvastatin (LIPITOR) 40 MG tablet Take 1 tablet (40 mg total) by mouth every evening. 90 tablet 3    metoprolol succinate (TOPROL-XL) 100 MG 24 hr tablet Take 1 tablet (100 mg total) by mouth once daily. 90  "tablet 3    nicotine (NICODERM CQ) 21 mg/24 hr Place 1 patch onto the skin once daily. for 14 days 14 patch 0    oxybutynin (DITROPAN-XL) 10 MG 24 hr tablet Take 1 tablet (10 mg total) by mouth once daily. for 10 days 90 tablet 0    polyethylene glycol (GLYCOLAX) 17 gram/dose powder 1/2 to 1 cap daily to prevent constipation on pain meds 255 g 0    varenicline (CHANTIX STARTING MONTH BOX) 0.5 mg (11)- 1 mg (42) tablet Take one 0.5mg tab by mouth once daily X3 days,then increase to one 0.5mg tab twice daily X4 days,then increase to one 1mg tab twice daily 1 Package 0    gabapentin (NEURONTIN) 300 MG capsule Take 1 capsule (300 mg total) by mouth every evening. 30 capsule 0     No current facility-administered medications for this visit.        ROS  Review of Systems   Constitutional: Negative for chills and fever.   HENT: Negative for hearing loss and sore throat.    Eyes: Negative for visual disturbance.   Respiratory: Negative for cough and shortness of breath.    Cardiovascular: Negative for chest pain, palpitations and leg swelling.   Gastrointestinal: Negative for abdominal pain, constipation, diarrhea, nausea and vomiting.   Genitourinary: Negative for dysuria, frequency and urgency.        Pain on urination   Musculoskeletal: Negative for arthralgias, joint swelling and myalgias.   Skin: Negative for rash and wound.   Neurological: Negative for headaches.   Psychiatric/Behavioral: Negative for agitation and confusion. The patient is not nervous/anxious.          OBJECTIVE     Physical Exam  Vitals:    09/28/18 1050   BP: 104/70   Pulse: (!) 56    Body mass index is 24.38 kg/m².  Weight: 79.3 kg (174 lb 13.2 oz)   Height: 5' 11" (180.3 cm)     Physical Exam   Constitutional: He is oriented to person, place, and time. He appears well-developed and well-nourished. No distress.   HENT:   Head: Normocephalic and atraumatic.   Right Ear: External ear normal.   Left Ear: External ear normal.   Nose: Nose normal. "   Mouth/Throat: Oropharynx is clear and moist.   Eyes: Conjunctivae and EOM are normal. Right eye exhibits no discharge. Left eye exhibits no discharge. No scleral icterus.   Neck: Normal range of motion. Neck supple. No JVD present. No tracheal deviation present.   Cardiovascular: Normal rate, regular rhythm, normal heart sounds and intact distal pulses. Exam reveals no gallop and no friction rub.   No murmur heard.  Pulmonary/Chest: Effort normal and breath sounds normal. No respiratory distress. He has no wheezes.   Abdominal: Soft. Bowel sounds are normal. He exhibits no distension and no mass. There is no tenderness. There is no rebound and no guarding.   Musculoskeletal: Normal range of motion. He exhibits no edema, tenderness or deformity.   Neurological: He is alert and oriented to person, place, and time. He exhibits normal muscle tone. Coordination normal.   Skin: Skin is warm and dry. No rash noted. No erythema.   Psychiatric: He has a normal mood and affect. His behavior is normal. Judgment and thought content normal.         Health Maintenance       Date Due Completion Date    TETANUS VACCINE 11/27/1970 ---    Colonoscopy 11/27/1970 ---    Influenza Vaccine 08/01/2018 11/17/2017    Lipid Panel 11/20/2018 11/20/2017    High Dose Statin 09/28/2019 9/28/2018    Pneumococcal (65+) (2 of 2 - PPSV23) 11/17/2022 11/17/2017            ASSESSMENT     67 y.o. male with     1. Foot pain, bilateral    2. Neuropathy    3. Needs flu shot    4. Encounter for screening colonoscopy    5. Abnormal finding of blood chemistry     6. Prediabetes     7. Nutritional anemia         PLAN:     1. Foot pain, bilateral  - Likely having neuropathy; management as below     2. Neuropathy  - Will start trial course of Gabapentin as below  - CBC auto differential; Future  - Comprehensive metabolic panel; Future  - Hemoglobin A1c; Future  - TSH; Future  - Vitamin B12; Future  - Folate; Future  - Vitamin B1; Future  - DONAVAN; Future  -  gabapentin (NEURONTIN) 300 MG capsule; Take 1 capsule (300 mg total) by mouth every evening.  Dispense: 30 capsule; Refill: 0    3. Needs flu shot  - Influenza - High Dose (65+) (PF) (IM)    4. Encounter for screening colonoscopy  - Case request GI: COLONOSCOPY    5. Abnormal finding of blood chemistry   - Hemoglobin A1c; Future    6. Prediabetes   - TSH; Future    7. Nutritional anemia   - Vitamin B12; Future  - Folate; Future        RTC in 6 months     Sayda Cheung MD  09/28/2018 10:56 AM        No Follow-up on file.

## 2018-09-29 LAB
ESTIMATED AVG GLUCOSE: 105 MG/DL
FOLATE SERPL-MCNC: 5.3 NG/ML
HBA1C MFR BLD HPLC: 5.3 %
VIT B12 SERPL-MCNC: 329 PG/ML

## 2018-10-01 ENCOUNTER — CLINICAL SUPPORT (OUTPATIENT)
Dept: SMOKING CESSATION | Facility: CLINIC | Age: 68
End: 2018-10-01
Payer: COMMERCIAL

## 2018-10-01 DIAGNOSIS — Z12.11 COLON CANCER SCREENING: Primary | ICD-10-CM

## 2018-10-01 DIAGNOSIS — F17.200 NICOTINE DEPENDENCE: Primary | ICD-10-CM

## 2018-10-01 LAB — ANA SER QL IF: NORMAL

## 2018-10-01 PROCEDURE — 90853 GROUP PSYCHOTHERAPY: CPT | Mod: S$GLB,,,

## 2018-10-01 PROCEDURE — 99999 PR PBB SHADOW E&M-EST. PATIENT-LVL I: CPT | Mod: PBBFAC,,,

## 2018-10-01 NOTE — PROGRESS NOTES
Smoking Cessation Group Session #1a    Site: [facility]  Date:  10/1/18  Clinical Status of Patient: Outpatient   Length of Service and Code: 90 minutes - 24425   Number in Attendance:   Group Activities/Focus of Group:  Sharing last weeks challenges, triggers, and coping activities to remain quit and/ or keep making progress toward cessation, completion of TCRS (Tobacco Cessation Rating Scale) learned addiction model, personal reasons for quitting, medications, goals, quit date.    Target symptoms:  withdrawal and medication side effects             The following were rated moderate (3) to severe (4) on TCRS:       Moderate 3: none     Severe 4:   none  Patient's Response to Intervention:Patient's Response to Intervention: Active participation, self-disclosure, supportive of group and peers. Pt continues to smoke 15 cigs/day. Pt remains on tobacco cessation medication of 21 mg nicotine patch QD  Discussed symptoms rated as 3 or 4 on TCRS scale, none reported at this time.  Pt's exhaled carbon monoxide level was 18* ppm per smokerlyzer (0-6 ppm non-smoker). Will see pt back in group next week. Patient has not received his Chantix through mail order yet.   Progress Toward Goals and Other Mental Status Changes: Pt will continue with rate reduction plan and wait times prior to smoking. The patient denies any abnormal behavioral or mental changes at this time.        Diagnosis: Z72.0  Plan: The patient will continue with group therapy sessions and tobacco cessation medication monitoring by CTTS.   Return to Clinic: 1 weeks

## 2018-10-02 ENCOUNTER — OFFICE VISIT (OUTPATIENT)
Dept: SURGERY | Facility: CLINIC | Age: 68
End: 2018-10-02
Payer: MEDICARE

## 2018-10-02 DIAGNOSIS — I71.40 ABDOMINAL AORTIC ANEURYSM (AAA) WITHOUT RUPTURE: ICD-10-CM

## 2018-10-02 PROCEDURE — 99213 OFFICE O/P EST LOW 20 MIN: CPT | Mod: S$GLB,,, | Performed by: SURGERY

## 2018-10-03 LAB — VIT B1 SERPL-MCNC: 51 UG/L (ref 38–122)

## 2018-10-08 ENCOUNTER — CLINICAL SUPPORT (OUTPATIENT)
Dept: SMOKING CESSATION | Facility: CLINIC | Age: 68
End: 2018-10-08
Payer: COMMERCIAL

## 2018-10-08 DIAGNOSIS — F17.200 NICOTINE DEPENDENCE: Primary | ICD-10-CM

## 2018-10-08 PROCEDURE — 99407 BEHAV CHNG SMOKING > 10 MIN: CPT | Mod: S$GLB,,,

## 2018-10-08 RX ORDER — VARENICLINE TARTRATE 1 MG/1
1 TABLET, FILM COATED ORAL 2 TIMES DAILY
Qty: 60 TABLET | Refills: 0 | Status: SHIPPED | OUTPATIENT
Start: 2018-10-08 | End: 2018-10-15

## 2018-10-10 ENCOUNTER — TELEPHONE (OUTPATIENT)
Dept: SURGERY | Facility: CLINIC | Age: 68
End: 2018-10-10

## 2018-10-10 DIAGNOSIS — I71.40 ABDOMINAL AORTIC ANEURYSM (AAA) WITHOUT RUPTURE: ICD-10-CM

## 2018-10-15 ENCOUNTER — CLINICAL SUPPORT (OUTPATIENT)
Dept: UROLOGY | Facility: CLINIC | Age: 68
End: 2018-10-15
Payer: MEDICARE

## 2018-10-15 ENCOUNTER — APPOINTMENT (OUTPATIENT)
Dept: LAB | Facility: HOSPITAL | Age: 68
End: 2018-10-15
Attending: UROLOGY
Payer: MEDICARE

## 2018-10-15 ENCOUNTER — CLINICAL SUPPORT (OUTPATIENT)
Dept: SMOKING CESSATION | Facility: CLINIC | Age: 68
End: 2018-10-15
Payer: COMMERCIAL

## 2018-10-15 DIAGNOSIS — F17.200 NICOTINE DEPENDENCE: Primary | ICD-10-CM

## 2018-10-15 DIAGNOSIS — R82.998 CELLS AND CASTS IN URINE: Primary | ICD-10-CM

## 2018-10-15 LAB
BILIRUB UR QL STRIP: NEGATIVE
CLARITY UR: CLEAR
COLOR UR: YELLOW
GLUCOSE UR QL STRIP: NEGATIVE
HGB UR QL STRIP: ABNORMAL
KETONES UR QL STRIP: NEGATIVE
LEUKOCYTE ESTERASE UR QL STRIP: ABNORMAL
MICROSCOPIC COMMENT: ABNORMAL
NITRITE UR QL STRIP: NEGATIVE
PH UR STRIP: 6 [PH] (ref 5–8)
PROT UR QL STRIP: ABNORMAL
RBC #/AREA URNS HPF: 10 /HPF (ref 0–4)
SP GR UR STRIP: 1.02 (ref 1–1.03)
SQUAMOUS #/AREA URNS HPF: 1 /HPF
URN SPEC COLLECT METH UR: ABNORMAL
UROBILINOGEN UR STRIP-ACNC: NEGATIVE EU/DL
WBC #/AREA URNS HPF: 15 /HPF (ref 0–5)

## 2018-10-15 PROCEDURE — 87077 CULTURE AEROBIC IDENTIFY: CPT

## 2018-10-15 PROCEDURE — 81000 URINALYSIS NONAUTO W/SCOPE: CPT

## 2018-10-15 PROCEDURE — 99406 BEHAV CHNG SMOKING 3-10 MIN: CPT | Mod: S$GLB,,,

## 2018-10-15 PROCEDURE — 87186 SC STD MICRODIL/AGAR DIL: CPT

## 2018-10-15 PROCEDURE — 87086 URINE CULTURE/COLONY COUNT: CPT

## 2018-10-15 PROCEDURE — 87088 URINE BACTERIA CULTURE: CPT

## 2018-10-15 PROCEDURE — 99499 UNLISTED E&M SERVICE: CPT | Mod: S$PBB,,, | Performed by: UROLOGY

## 2018-10-15 RX ORDER — VARENICLINE TARTRATE 0.5 (11)-1
KIT ORAL
Qty: 1 PACKAGE | Refills: 0 | Status: SHIPPED | OUTPATIENT
Start: 2018-10-15 | End: 2018-11-05

## 2018-10-18 LAB — BACTERIA UR CULT: NORMAL

## 2018-10-19 ENCOUNTER — HOSPITAL ENCOUNTER (OUTPATIENT)
Dept: RADIOLOGY | Facility: HOSPITAL | Age: 68
Discharge: HOME | End: 2018-10-19
Attending: SURGERY
Payer: MEDICARE

## 2018-10-19 DIAGNOSIS — I71.40 ABDOMINAL AORTIC ANEURYSM (AAA) WITHOUT RUPTURE: ICD-10-CM

## 2018-10-19 PROCEDURE — 75635 CT ANGIO ABDOMINAL ARTERIES: CPT | Mod: TC

## 2018-10-19 PROCEDURE — 25500020 PHARM REV CODE 255: Performed by: SURGERY

## 2018-10-19 PROCEDURE — 75635 CT ANGIO ABDOMINAL ARTERIES: CPT | Mod: 26,,, | Performed by: RADIOLOGY

## 2018-10-19 RX ORDER — AMOXICILLIN AND CLAVULANATE POTASSIUM 875; 125 MG/1; MG/1
1 TABLET, FILM COATED ORAL 2 TIMES DAILY
Qty: 10 TABLET | Refills: 0 | Status: SHIPPED | OUTPATIENT
Start: 2018-10-19 | End: 2018-10-24

## 2018-10-19 RX ADMIN — IOHEXOL 100 ML: 350 INJECTION, SOLUTION INTRAVENOUS at 01:10

## 2018-10-22 ENCOUNTER — TELEPHONE (OUTPATIENT)
Dept: UROLOGY | Facility: CLINIC | Age: 68
End: 2018-10-22

## 2018-10-22 ENCOUNTER — HOSPITAL ENCOUNTER (OUTPATIENT)
Facility: AMBULARY SURGERY CENTER | Age: 68
Discharge: HOME | End: 2018-10-22
Attending: UROLOGY | Admitting: UROLOGY
Payer: MEDICARE

## 2018-10-22 ENCOUNTER — CLINICAL SUPPORT (OUTPATIENT)
Dept: SMOKING CESSATION | Facility: CLINIC | Age: 68
End: 2018-10-22
Payer: COMMERCIAL

## 2018-10-22 VITALS
SYSTOLIC BLOOD PRESSURE: 147 MMHG | HEART RATE: 48 BPM | DIASTOLIC BLOOD PRESSURE: 83 MMHG | RESPIRATION RATE: 18 BRPM | WEIGHT: 174.81 LBS | OXYGEN SATURATION: 98 % | TEMPERATURE: 97 F | BODY MASS INDEX: 24.47 KG/M2 | HEIGHT: 71 IN

## 2018-10-22 DIAGNOSIS — F17.200 NICOTINE DEPENDENCE: Primary | ICD-10-CM

## 2018-10-22 DIAGNOSIS — N99.110 POSTPROCEDURAL URETHRAL STRICTURE, MALE, MEATAL: Primary | ICD-10-CM

## 2018-10-22 DIAGNOSIS — N40.0 BENIGN PROSTATIC HYPERPLASIA, UNSPECIFIED WHETHER LOWER URINARY TRACT SYMPTOMS PRESENT: ICD-10-CM

## 2018-10-22 LAB
BACTERIA SPEC CULT: NORMAL
BILIRUB SERPL-MCNC: NORMAL MG/DL
BLOOD URINE, POC: NORMAL
CASTS: NORMAL
COLOR, POC UA: NORMAL
CRYSTALS: NORMAL
GLUCOSE UR QL STRIP: 100
KETONES UR QL STRIP: NORMAL
LEUKOCYTE ESTERASE URINE, POC: NORMAL
NITRITE, POC UA: NORMAL
PH, POC UA: 5
PROTEIN, POC: 50
RBC CELLS COUNTED: NORMAL
SPECIFIC GRAVITY, POC UA: 1.02
UROBILINOGEN, POC UA: NORMAL
WHITE BLOOD CELLS: NORMAL

## 2018-10-22 PROCEDURE — 52281 CYSTOSCOPY AND TREATMENT: CPT | Mod: 58,,, | Performed by: UROLOGY

## 2018-10-22 PROCEDURE — 99407 BEHAV CHNG SMOKING > 10 MIN: CPT | Mod: S$GLB,,,

## 2018-10-22 PROCEDURE — 52265 CYSTOSCOPY AND TREATMENT: CPT | Performed by: UROLOGY

## 2018-10-22 PROCEDURE — 52281 CYSTOSCOPY AND TREATMENT: CPT | Performed by: UROLOGY

## 2018-10-22 RX ORDER — WATER 1000 ML/1000ML
INJECTION, SOLUTION INTRAVENOUS
Status: DISCONTINUED | OUTPATIENT
Start: 2018-10-22 | End: 2018-10-22 | Stop reason: HOSPADM

## 2018-10-22 RX ORDER — LIDOCAINE HYDROCHLORIDE 20 MG/ML
JELLY TOPICAL
Status: DISCONTINUED | OUTPATIENT
Start: 2018-10-22 | End: 2018-10-22 | Stop reason: HOSPADM

## 2018-10-22 NOTE — DISCHARGE SUMMARY
[facility]  Urology  Discharge Note - Short Stay      Patient Name: Antolin Richardson  MRN: [id number]  Discharge Date and Time:  10/22/2018 3:38 PM  Attending Physician: No att. providers found   Discharging Provider: Poly Marcum MD  Primary Care Physician: Sayda Cheung MD    Final Active Diagnoses:    Diagnosis Date Noted POA    Benign prostatic hyperplasia [N40.0] 10/22/2018 Yes      Problems Resolved During this Admission:       Final Diagnoses: Same as principal problem.    Hospital Course: Patient was admitted for an outpatient procedure and tolerated the procedure well with no complications.*    Procedure(s) (LRB):  CYSTOSCOPY (N/A)     Indwelling Lines/Drains at time of discharge:   Lines/Drains/Airways          None          Discharged Condition: good    Disposition: home    Follow Up:      Patient Instructions:   No discharge procedures on file.    Medications:  Reconciled Home Medications:      Medication List      CONTINUE taking these medications    amLODIPine 10 MG tablet  Commonly known as:  NORVASC  Take 1 tablet (10 mg total) by mouth once daily.     amoxicillin-clavulanate 875-125mg 875-125 mg per tablet  Commonly known as:  AUGMENTIN  Take 1 tablet by mouth 2 (two) times daily. for 5 days     aspirin 81 MG EC tablet  Commonly known as:  ECOTRIN  Take 1 tablet (81 mg total) by mouth once daily.     atorvastatin 40 MG tablet  Commonly known as:  LIPITOR  Take 1 tablet (40 mg total) by mouth every evening.     gabapentin 300 MG capsule  Commonly known as:  NEURONTIN  Take 1 capsule (300 mg total) by mouth every evening.     metoprolol succinate 100 MG 24 hr tablet  Commonly known as:  TOPROL-XL  Take 1 tablet (100 mg total) by mouth once daily.     oxybutynin 10 MG 24 hr tablet  Commonly known as:  DITROPAN-XL  Take 1 tablet (10 mg total) by mouth once daily. for 10 days     polyethylene glycol 17 gram/dose powder  Commonly known as:  GLYCOLAX  1/2 to 1 cap daily to  prevent constipation on pain meds     varenicline 0.5 mg (11)- 1 mg (42) tablet  Commonly known as:  CHANTIX STARTING MONTH BOX  Take one 0.5mg tab by mouth once daily X3 days,then increase to one 0.5mg tab twice daily X4 days,then increase to one 1mg tab twice daily            Discharge Procedure Orders (must include Diet, Follow-up, Activity):  No discharge procedures on file.         Poly Marcum MD  Urology  [facility]

## 2018-10-22 NOTE — DISCHARGE INSTRUCTIONS
Cystoscopy    Cystoscopy is a procedure that lets your doctor look directly inside your urethra and bladder. It can be used to:  · Help diagnose a problem with your urethra, bladder, or kidneys.  · Take a sample (biopsy) of bladder or urethral tissue.  · Treat certain problems (such as removing kidney stones).  · Place a stent to bypass an obstruction.  · Take special X-rays of the kidneys.  Based on the findings, your doctor may recommend other tests or treatments.  What is a cystoscope?  A cystoscope is a telescope-like instrument that contains lenses and fiberoptics (small glass wires that make bright light). The cystoscope may be straight and rigid, or flexible to bend around curves in the urethra. The doctor may look directly into the cystoscope, or project the image onto a monitor.  Getting ready  · Ask your doctor if you should stop taking any medicines before the procedure.  · Ask whether you should avoid eating or drinking anything after midnight before the procedure.  · Follow any other instructions your doctor gives you.  Tell your doctor before the exam if you:  · Take any medicines, such as aspirin or blood thinners  · Have allergies to any medicines  · Are pregnant   The procedure  Cystoscopy is done in the doctors office, surgery center, or hospital. The doctor and a nurse are present during the procedure. It takes only a few minutes, longer if a biopsy, X-ray, or treatment needs to be done.  During the procedure:  · You lie on an exam table on your back, knees bent and legs apart. You are covered with a drape.  · Your urethra and the area around it are washed. Anesthetic jelly may be applied to numb the urethra. Other pain medicine is usually not needed. In some cases, you may be offered a mild sedative to help you relax. If a more extensive procedure is to be done, such as a biopsy or kidney stone removal, general anesthesia may be needed.  · The cystoscope is inserted. A sterile fluid is put  into the bladder to expand it. You may feel pressure from this fluid.  · When the procedure is done, the cystoscope is removed.  After the procedure  If you had a sedative, general anesthesia, or spinal anesthesia, you must have someone drive you home. Once youre home:  · Drink plenty of fluids.  · You may have burning or light bleeding when you urinate--this is normal.  · Medicines may be prescribed to ease any discomfort or prevent infection. Take these as directed.  · Call your doctor if you have heavy bleeding or blood clots, burning that lasts more than a day, a fever over 100°F  (38° C), or trouble urinating.  Date Last Reviewed: 1/1/2017  © 5944-4359 [employer]. [address] [place] [address], [place] [address]. All rights reserved. This information is not intended as a substitute for professional medical care. Always follow your healthcare professional's instructions.

## 2018-10-22 NOTE — H&P
[facility] H&P Note - [place]  Staff: MD Trixie     Referring provider and please cc: self  PCP: Dr[facility]: [facility]      Chief Complaint: gross hematuria and frequency     Subjective:        HPI: Antolin Richardson is a 67 y.o. male presents with      OAB, BPH, recurrent uti/prostatitis.   -he initially came to see me 2/27/18 for frequency q20 to 30 minutes, weak stream and sometimes has sprayed stream. He had never been on any meds for his prostate and denied any hesistancy. He states that the sx had occurred since surgery (AAA repair and had catheter during this surgery) in November however CTA shows a very enlarged prostate. 6x5cm in transverse with large intravesical lobe. He also drinks 5 cups of coffee in the morning and 2 cups of tea in the pm. I started him on flomax 0.4mg nightly and he had a uroflow 3/26/18 which showed a mean flow of 6cc with voided volume of 70 and pvr of 17. I did a cysto trus on 4/2/18 which showed a 99g prostate with a large circumferential median lobe. PSA was initially 3.7 and repated it and it came down to 3.0 with 30% free. I referred him to  to discuss robotic simple prostatectomy however he was worried insurance would not cover visit and did not go to appt. He returns today and states that he has burning with urination, oab and denies incomplete emptying. He has had prostatitis/uti at least twice now but has never been on abx for longer than 10 days. Repeated uroflow today but not evnough voided volume (59cc). pvr by in and out cath today: 30cc    AUA ssx 6/21/18:(5 incomplete emptying, 5 frequency, 5 intermittency, 5 urgency, 5 weak stream, 0 straining, 5 sleeping). 30. QOL: unhappy  -underwent meatotomy, cysto b rgp and turp on 8/1/18. Path negative. 36g resected.   -postop 8/9/18 pvr was 61cc. Taught how to dilate meatus.  -returns today and states that he's been having oab with frequency q20 to 30 minutes which is not new but with  some urgency and urge incontinence which is new since surgery. He has been dilating 2x a day. He has discontinued finasteride and is still on flomax. He states he's been having pain at the end of urination in his lower abdomen and he is still on abx.      In and out cath today done with 16fr coude but had meatal stricture recurrence. I had to push through this. Then dilated with a 16fr and then 18fr. I showed him where it was catching. Residual 20cc.      psa history  8/1/18              Turp chips: 36g  4/2/18              trus volume: 99g  3/19/18            3.0, %free 29.67  2/27/18            3.8, % free 31.35, Marlon: no nodules, 40+g prostate     Gross hematuria  -he's had blood in his urine intermittently since February which is what brought him to see me as he had never had GH before. The last time was just a few days ago. Workup reveals likely from prostate.   -On asa 81mg daily. Circumcised. Denies any new frequency that is new or worse, denies any dysuria.  -cta 11/2017 shows no stones.   -cytology 2/27/18: negative, +tobacco use (1.5 ppd x 50 years and still smokes). Repeat cytology 6/21/18: negative for malignancy, rare atypical cells  -cystoscopy trus 4/2/18: 99g prostate, no tumors.     Denies any recurrent gross hematuria. Back on blood thinners.      ua today void: 1+wbc/50 blood- on augmentin  Urine history:   10/15/18 Proteus, void: 2+bld/1+leuk, 10 rbc, 15 wbc, tx with augmentin for cysto   9/25/18 ng, void: 1+leuk/250 blood, cath: 3+bld/1+erick, cytology: atypia, pvr by I&O: 30cc  7/25/18            Ng, cath: negative  7/9/18              Ng, void: 3+blood/tr protein  6/21/18            Coag neg staph, void and cath: nit+/3+blood/1+leuk, pvr I&0: 30cc, cytology: rare atypical cells, neg for malignancy.   4/2/18              No cx, void: 1+leuk and blood   3/31/18            Ng, void: negative  3/26/18            Coag neg staph, void: 2+ wbc, nit+, 50 blood - bactrim twice a day for 10 days  3/19/18             Ng, void: 1+ blooda  2/27/18            Multiple org, void: 1+wbc, tr blood (bactrim x 7d), cytology: negative   ua today +, c/o burning     ED (did not discuss today)  -occ problems with erections.   -uses viagra  -IIEF: 13cc     REVIEW OF SYSTEMS:  General ROS: no fevers, no chills  Psychological ROS: no depression  Endocrine ROS: no heat or cold  Respiratory ROS: no SOB  Cardiovascular ROS: no CP  Gastrointestinal ROS: no abdominal pain, no constipation, no diarrhea, no BRBPR  Musculoskeletal ROS: no muscle pain  Neurological ROS: no headaches  Dermatological ROS: no rashes  HEENT: noglasses, no sinus   ROS: per HPI     PMHx:       Past Medical History:   Diagnosis Date    AAA (abdominal aortic aneurysm) 12/2016     s/p endo repair ([facility])    Acid reflux      Anxiety      Coronary artery disease      Coronary artery disease involving native coronary artery of native heart without angina pectoris 1/3/2017    Hypertension      Myocardial infarction        Kidney stones: No  Cataracts? none     PSHx:        Past Surgical History:   Procedure Laterality Date    ABDOMINAL AORTIC ANEURYSM REPAIR        APPENDECTOMY        CORONARY ANGIOPLASTY   2009    coronary artery stent         x1    CYSTOSCOPY N/A 4/2/2018     [facility]    CYSTOSCOPY   3/26/2018     Performed [facility] at [facility]    REPAIR ABDOMINAL-AORTIC ANEURYSM-ENDOVASCULAR GRAFT (AAA) (C-ARM) N/A 12/12/2016     Per[facility]al [facility] at [facility] OR    stent for abdominal aneurysm        TRANSRECTAL ULTRASOUND N/A 4/2/2018     Performed by Poly [facility]um [facility] at [facility] OR    TRANSRECTAL ULTRASOUND   3/26/2018     Performed by Poly [facility]um [facility] at [facility]    TRANSURETHRAL RESECTION OF PROSTATE N/A 8/1/2018     Procedure: TRANSURETHRAL RESECTION OF PROSTATE (TURP);  Surgeon: Poly Marcum MD;  [facility];  Service: Urology;   Laterality: N/A;    TRANSURETHRAL RESECTION OF PROSTATE (TURP) N/A 2018     Performed by [facility]um [facility] at [facility] OR      Stents/Valves/Foreign Bodies: Yes - last placed in   Cardiac Evaluation: Yes -      Screening Studies  Colonoscopy: 2017 found polyps     Fam Hx:   malignancies: No  . Gyn malignancies: no. Parents  and 80s and mother had brain cancer  kidney stones: No      Soc Hx:  +tobacco.  1.5 pk per day x 50 year  occ alcohol  Lives in [place]  :   Children: 3  Occupation:retired from security     Allergies:  Lisinopril     Urologic meds: flomax 0.4mg nightly, finasteride  Anticoagulation: Yes - asa 81mg daily      Objective:      Vitals:    10/22/18 1325   BP: (!) 140/78   Pulse: (!) 53   Resp: 18   Temp: 97.3 °F (36.3 °C)             General:WDWN in NAD  Eyes: PERRLA, normal conjunctiva  Respiratory: No increased work on breathing.   Cardiovascular: No obvious extremity edema. Warm and well perfused.   GI: palpation of masses. No tenderness. No hepatosplenomegaly to palpation.  Musculoskeletal: normal range of motion of bilateral upper extremities. Normal muscle strength and tone.  Skin: no obvious rashes or lesions. No tightening of skin noted.  Neurologic: CN grossly normal. Normal sensation.   Psychiatric: awake, alert and oriented x 3. Mood and affect normal. Cooperative.     :  Penis is circumcised,    Last MONE 18: 40g gland without masses, tenderness. SV not palpable. Normal sphincter tone. No hemhorroids.          LABS REVIEW:     BMP  Lab Results   Component Value Date     2018    K 4.1 2018     2018    CO2 22 (L) 2018    BUN 13 2018    CREATININE 1.2 2018    CALCIUM 9.3 2018    ANIONGAP 11 2018    ESTGFRAFRICA >60 2018    EGFRNONAA >60 2018     Lab Results   Component Value Date    WBC 11.00 2018    HGB 16.2 2018    HCT 47.8 2018    MCV 87 2018      09/28/2018           PATHOLOGY REVIEW:  Urine cytology 9/25/18  -Urothelial atypia; few groups of atypical urothelial cells, benign and reactive urothelial cells, benign squamous cells  and mild to moderate acute inflammation (see comment).    Urine, catheterized 6/21/18  Negative for malignancy.  Rare atypical cells present.  Background of rbc's, bacteria and acute inflammatory cells.  Correlate clinically.     Urine 2/27/17: Negative for malignant cells. Will discuss at f/u     RADIOGRAPHIC REVIEW:  cta 11/20/17 images reviewed by me  -no stones  -enlarged prostate     Interval placement of aortobiiliac endovascular stent graft which is patent with no extravasation and with partial collapse of the native surrounding aneurysm sac.    Additional findings as detailed above including enlarged prostate gland, diverticulosis without CT findings of acute diverticulitis, left renal masses suggesting low density and high density cysts.     Assessment:       1. Benign prostatic hyperplasia, unspecified whether lower urinary tract symptoms present    2. Gross hematuria    3. Abnormal urine cytology    4. OAB (overactive bladder)    5. Meatal stenosis    6. Bladder pain    7. Urge incontinence           Plan:      BPH with LuTS and recurrent prostatitis/uti s/p turp 8/1/18 with OAB (present prior) and UUI (new) and meatal stricture   -discontinue flomax  -discontinue finasteride  -discontinue antibiotics  -start ditropan/oxybutynin 10mg daily for overactive bladder. I expect this to resolve/improve in the next 3 months with meatal dilation   - Meatal stricture recurrence. He is not pushing it far enough and I suspect he was having pain from this.  -repeat cysto on oct 22nd and see if any residual tissue needs to be resected and repeat meatal dilation     Gross hematuria and abnormal urine cytology, chronic OAB  -already had a cta in 11/2017  -urine cytology (h/o smoking) 2/2018 negative, 6/2018 cytology showed  atypical cells but likely inflammation. Repeat cytology atypia   -cysto trus was negative 4/2018   -repeat cytoscopy on oct 22, today     This patient has been cleared for surgery in ambulatory surgical facility.

## 2018-10-22 NOTE — TELEPHONE ENCOUNTER
----- Message from Poly Marcum MD sent at 10/22/2018  2:21 PM CDT -----  F/u in 1 month for in and out cath with 20fr cath

## 2018-10-22 NOTE — OP NOTE
"[facility]rology [place] Procedure Note- [facility]  Date: 10/22/2018    Procedures: Flexible cystourethroscopy and urethral meatus dilation    Pre Procedure Diagnosis: recurrent uti, bph, meatal stricture    Post Procedure Diagnosis: same, see below for findings    Surgeon: Poly Marcum MD    Indications: Antolin Richardson is a 67 y.o. male with recurrent uti and meatal stricture. Urine from today reviewed. H&P reviewed.     Specimen: none    Anesthesia: 2% uro-jet lidocaine jelly for local analgesia    Procedure in detail:   Flexible cysto-urethroscopy was performed after consent was obtained.  The risks and benefits were explained.    2% lidocaine urojet was used for local analgesia.  The genitalia was prepped and draped in the sterile fashion with betadine.    The flexible scope was advanced into the urethra and into the bladder.  Bilateral ureteral orifice were evaluated and noted to be normal with clear efflux.  The bladder was completely surveyed in a systematic fashion and the cytoscope was retroflexed.  Cystoscopy findings as listed below.     The meatus was dilated from 16fr to 24 Romansh with female urethral sounds. Patient tolerated. Then placed 20fr straight cath which went easily.     The patient tolerated the procedure well without complication.    Findings: (pictures were uploaded into media)  1. Urethra findings -  Meatus about 2cm in with stricture, was difficult to advance the scope and catheter. Dilated today to 24fr with female dilators from 16fr to 24 fr.   2. Prostate findings -Prostatic urethra with healing tissue. Anterior prostate with residual tissue that does not appear to be obstructing. Minimal median lobe.  3. Bladder findings - grade 1 trabeculations, no  diverticulum    Plan:  1. Discontinue use of meatal dilator, not working.  He will catheterize once daily to 4" with a 20fr in and out catheter until I see him again. This is not post-procedural, he had this at the time of turp. If " he cannot do 20 will need to do 18fr.   2. Finish abx   3. Return in 4 weeks for pvr by in and out cath with 20fr coude unless he hasn't been using 18fr.   4. Hold off on bph retreatment, open prostatic fossa.   5. uti- last ct 8/19/16 and rbus 7/9/18 with no stones.     Poly Marcum MD

## 2018-10-25 ENCOUNTER — HOSPITAL ENCOUNTER (OUTPATIENT)
Facility: HOSPITAL | Age: 68
Discharge: HOME | End: 2018-10-25
Attending: INTERNAL MEDICINE | Admitting: INTERNAL MEDICINE
Payer: MEDICARE

## 2018-10-25 ENCOUNTER — ANESTHESIA (OUTPATIENT)
Dept: ENDOSCOPY | Facility: HOSPITAL | Age: 68
End: 2018-10-25
Payer: MEDICARE

## 2018-10-25 ENCOUNTER — ANESTHESIA EVENT (OUTPATIENT)
Dept: ENDOSCOPY | Facility: HOSPITAL | Age: 68
End: 2018-10-25
Payer: MEDICARE

## 2018-10-25 VITALS
SYSTOLIC BLOOD PRESSURE: 148 MMHG | WEIGHT: 176 LBS | RESPIRATION RATE: 20 BRPM | TEMPERATURE: 98 F | OXYGEN SATURATION: 98 % | HEART RATE: 48 BPM | DIASTOLIC BLOOD PRESSURE: 79 MMHG | BODY MASS INDEX: 24.64 KG/M2 | HEIGHT: 71 IN

## 2018-10-25 DIAGNOSIS — K63.5 POLYP OF COLON, UNSPECIFIED PART OF COLON, UNSPECIFIED TYPE: Primary | ICD-10-CM

## 2018-10-25 PROCEDURE — 25000003 PHARM REV CODE 250: Performed by: NURSE ANESTHETIST, CERTIFIED REGISTERED

## 2018-10-25 PROCEDURE — 27201012 HC FORCEPS, HOT/COLD, DISP: Performed by: INTERNAL MEDICINE

## 2018-10-25 PROCEDURE — 45380 COLONOSCOPY AND BIOPSY: CPT | Performed by: INTERNAL MEDICINE

## 2018-10-25 PROCEDURE — 88305 TISSUE EXAM BY PATHOLOGIST: CPT | Mod: 59 | Performed by: PATHOLOGY

## 2018-10-25 PROCEDURE — 25000003 PHARM REV CODE 250: Performed by: INTERNAL MEDICINE

## 2018-10-25 PROCEDURE — D9220A PRA ANESTHESIA: Mod: PT,CRNA,, | Performed by: NURSE ANESTHETIST, CERTIFIED REGISTERED

## 2018-10-25 PROCEDURE — 27201089 HC SNARE, DISP (ANY): Performed by: INTERNAL MEDICINE

## 2018-10-25 PROCEDURE — 45385 COLONOSCOPY W/LESION REMOVAL: CPT | Performed by: INTERNAL MEDICINE

## 2018-10-25 PROCEDURE — 88305 TISSUE EXAM BY PATHOLOGIST: CPT | Mod: 26,,, | Performed by: PATHOLOGY

## 2018-10-25 PROCEDURE — 37000008 HC ANESTHESIA 1ST 15 MINUTES: Performed by: INTERNAL MEDICINE

## 2018-10-25 PROCEDURE — D9220A PRA ANESTHESIA: Mod: PT,ANES,, | Performed by: ANESTHESIOLOGY

## 2018-10-25 PROCEDURE — 63600175 PHARM REV CODE 636 W HCPCS: Performed by: NURSE ANESTHETIST, CERTIFIED REGISTERED

## 2018-10-25 PROCEDURE — 37000009 HC ANESTHESIA EA ADD 15 MINS: Performed by: INTERNAL MEDICINE

## 2018-10-25 RX ORDER — PROPOFOL 10 MG/ML
VIAL (ML) INTRAVENOUS
Status: COMPLETED
Start: 2018-10-25 | End: 2018-10-25

## 2018-10-25 RX ORDER — PROPOFOL 10 MG/ML
VIAL (ML) INTRAVENOUS
Status: DISCONTINUED | OUTPATIENT
Start: 2018-10-25 | End: 2018-10-25

## 2018-10-25 RX ORDER — LIDOCAINE HCL/PF 100 MG/5ML
SYRINGE (ML) INTRAVENOUS
Status: DISCONTINUED | OUTPATIENT
Start: 2018-10-25 | End: 2018-10-25

## 2018-10-25 RX ORDER — GLYCOPYRROLATE 0.2 MG/ML
INJECTION INTRAMUSCULAR; INTRAVENOUS
Status: DISCONTINUED | OUTPATIENT
Start: 2018-10-25 | End: 2018-10-25

## 2018-10-25 RX ORDER — SODIUM CHLORIDE 9 MG/ML
INJECTION, SOLUTION INTRAVENOUS CONTINUOUS
Status: DISCONTINUED | OUTPATIENT
Start: 2018-10-25 | End: 2018-10-25 | Stop reason: HOSPADM

## 2018-10-25 RX ORDER — GLYCOPYRROLATE 0.2 MG/ML
INJECTION INTRAMUSCULAR; INTRAVENOUS
Status: DISCONTINUED
Start: 2018-10-25 | End: 2018-10-25 | Stop reason: HOSPADM

## 2018-10-25 RX ORDER — LIDOCAINE HYDROCHLORIDE 20 MG/ML
INJECTION, SOLUTION EPIDURAL; INFILTRATION; INTRACAUDAL; PERINEURAL
Status: DISCONTINUED
Start: 2018-10-25 | End: 2018-10-25 | Stop reason: HOSPADM

## 2018-10-25 RX ADMIN — LIDOCAINE HYDROCHLORIDE 100 MG: 20 INJECTION, SOLUTION INTRAVENOUS at 10:10

## 2018-10-25 RX ADMIN — PROPOFOL 20 MG: 10 INJECTION, EMULSION INTRAVENOUS at 10:10

## 2018-10-25 RX ADMIN — GLYCOPYRROLATE 0.2 MG: 0.2 INJECTION, SOLUTION INTRAMUSCULAR; INTRAVENOUS at 11:10

## 2018-10-25 RX ADMIN — PROPOFOL 20 MG: 10 INJECTION, EMULSION INTRAVENOUS at 11:10

## 2018-10-25 RX ADMIN — PROPOFOL 40 MG: 10 INJECTION, EMULSION INTRAVENOUS at 11:10

## 2018-10-25 RX ADMIN — PROPOFOL 80 MG: 10 INJECTION, EMULSION INTRAVENOUS at 10:10

## 2018-10-25 RX ADMIN — SODIUM CHLORIDE: 0.9 INJECTION, SOLUTION INTRAVENOUS at 10:10

## 2018-10-25 RX ADMIN — PROPOFOL 30 MG: 10 INJECTION, EMULSION INTRAVENOUS at 11:10

## 2018-10-25 NOTE — TRANSFER OF CARE
"Anesthesia Transfer of Care Note    Patient: Antolin Richardson    Procedure(s) Performed: Procedure(s) (LRB):  COLONOSCOPY (N/A)    Patient location: GI    Anesthesia Type: MAC    Transport from OR: Transported from OR on room air with adequate spontaneous ventilation    Post pain: adequate analgesia    Post assessment: no apparent anesthetic complications and tolerated procedure well    Post vital signs: stable    Level of consciousness: awake, alert and oriented    Nausea/Vomiting: no nausea/vomiting    Complications: none    Transfer of care protocol was followed      Last vitals:   Visit Vitals  /66 (BP Location: Left arm, Patient Position: Lying)   Pulse (!) 42   Temp 36.4 °C (97.5 °F) (Oral)   Resp 14   Ht 5' 11" (1.803 m)   Wt 79.8 kg (176 lb)   SpO2 95%   BMI 24.55 kg/m²     "

## 2018-10-25 NOTE — ANESTHESIA PREPROCEDURE EVALUATION
10/25/2018  Antolin Richardson is a 67 y.o., male.    Pre-op Assessment    I have reviewed the Patient Summary Reports.     I have reviewed the Nursing Notes.      Review of Systems  Anesthesia Hx:  No problems with previous Anesthesia    Social:  Smoker, Alcohol Use    Cardiovascular:   Hypertension, well controlled Past MI CAD asymptomatic CABG/stent  hyperlipidemia ECG has been reviewed. CAD - stent X 1 - stable Abdominal Aortic Aneurysm, s/p surgical repair    Renal/:   BPH    Hepatic/GI:   GERD        Physical Exam  General:  Well nourished    Airway/Jaw/Neck:  Airway Findings: Mallampati: II    Eyes/Ears/Nose:  EYES/EARS/NOSE FINDINGS: Normal    Chest/Lungs:  Chest/Lungs Clear    Heart/Vascular:  Heart Findings: Normal       Mental Status:  Mental Status Findings: Normal        Anesthesia Plan  Type of Anesthesia, risks & benefits discussed:  Anesthesia Type:  general  Patient's Preference:   Intra-op Monitoring Plan: standard ASA monitors  Intra-op Monitoring Plan Comments:   Post Op Pain Control Plan: per primary service following discharge from PACU, IV/PO Opioids PRN and multimodal analgesia  Post Op Pain Control Plan Comments:   Induction:   IV  Beta Blocker:  Patient is on a Beta-Blocker and has received one dose within the past 24 hours (No further documentation required).       Informed Consent: Patient understands risks and agrees with Anesthesia plan.  Questions answered. Anesthesia consent signed with patient.  ASA Score: 3     Day of Surgery Review of History & Physical:    H&P update referred to the surgeon.         Ready For Surgery From Anesthesia Perspective.

## 2018-10-25 NOTE — [employer] PATIENT INSTRUCTIONS
Discharge Summary/Instructions after an Endoscopic Procedure  Patient Name: Antolin Richardson  Patient MRN: [id number]  Patient YOB: 1950 Thursday, October 25, 2018  Jackie Linares MD  RESTRICTIONS:  During your procedure today, you received medications for sedation.  These   medications may affect your judgment, balance and coordination.  Therefore,   for 24 hours, you have the following restrictions:   - DO NOT drive a car, operate machinery, make legal/financial decisions,   sign important papers or drink alcohol.    ACTIVITY:  Today: no heavy lifting, straining or running due to procedural   sedation/anesthesia.  The following day: return to full activity including work.  DIET:  Eat and drink normally unless instructed otherwise.     TREATMENT FOR COMMON SIDE EFFECTS:  - Mild abdominal pain, nausea, belching, bloating or excessive gas:  rest,   eat lightly and use a heating pad.  - Sore Throat: treat with throat lozenges and/or gargle with warm salt   water.  - Because air was used during the procedure, expelling large amounts of air   from your rectum or belching is normal.  - If a bowel prep was taken, you may not have a bowel movement for 1-3 days.    This is normal.  SYMPTOMS TO WATCH FOR AND REPORT TO YOUR PHYSICIAN:  1. Abdominal pain or bloating, other than gas cramps.  2. Chest pain.  3. Back pain.  4. Signs of infection such as: chills or fever occurring within 24 hours   after the procedure.  5. Rectal bleeding, which would show as bright red, maroon, or black stools.   (A tablespoon of blood from the rectum is not serious, especially if   hemorrhoids are present.)  6. Vomiting.  7. Weakness or dizziness.  GO DIRECTLY TO THE NEAREST EMERGENCY ROOM IF YOU HAVE ANY OF THE FOLLOWING:      Difficulty breathing              Chills and/or fever over 101 F   Persistent vomiting and/or vomiting blood   Severe abdominal pain   Severe chest pain   Black, tarry stools   Bleeding- more than one  tablespoon   Any other symptom or condition that you feel may need urgent attention  Your doctor recommends these additional instructions:  If any biopsies were taken, your doctors clinic will contact you in 1 to 2   weeks with any results.  - Patient has a contact number available for emergencies.  The signs and   symptoms of potential delayed complications were discussed with the   patient.  Return to normal activities tomorrow.  Written discharge   instructions were provided to the patient.   - Discharge patient to home.   - Resume previous diet today.   - Continue present medications.   - Await pathology results.   - Repeat colonoscopy in 3 years for surveillance of multiple polyps.   - Return to primary care physician in 1 month.   - The findings and recommendations were discussed with the patient and their   family.  For questions, problems or results please call your physician - Jackie Linares MD at Work:  [phone].  [facility] can be reached at [phone]     IF A COMPLICATION OR EMERGENCY SITUATION ARISES AND YOU ARE UNABLE TO REACH   YOUR PHYSICIAN - GO DIRECTLY TO THE EMERGENCY ROOM.  Jackie Linares MD  10/25/2018 11:45:53 AM  This report has been verified and signed electronically.  [employer]

## 2018-10-25 NOTE — H&P
Endoscopy Pre-Procedure H&P    Reason for Procedure: surveillance colonoscopy    HPI:  Pt is a 67 y.o. male who presents for surveillance colonoscopy.  No family history and no GI symptoms.  He has a history of polyps on colonoscopy 3-4 years ago.    Past Medical History:   Diagnosis Date    AAA (abdominal aortic aneurysm) 12/2016    s/p endo repair ([facility])    Acid reflux     Anxiety     Coronary artery disease     Coronary artery disease involving native coronary artery of native heart without angina pectoris 1/3/2017    Hypertension     Myocardial infarction        Past Surgical History:   Procedure Laterality Date    ABDOMINAL AORTIC ANEURYSM REPAIR      APPENDECTOMY      CORONARY ANGIOPLASTY  2009    coronary artery stent      x1    CYSTOSCOPY N/A 4/2/2018    [facility] OR    CYSTOSCOPY  3/26/2018    Per[facility] by [facility] at [facility] OR    REPAIR ABDOMINAL-AORTIC ANEURYSM-ENDOVASCULAR GRAFT (AAA) (C-ARM) N/A 12/12/2016    [facility]al [facility] OR    stent for abdominal aneurysm      TRANSRECTAL ULTRASOUND N/A 4/2/2018    Performed [facility]um [facility] at [facility] OR    TRANSRECTAL ULTRASOUND  3/26/2018    Performed [facility] Poly [facility] at [facility] OR    TRANSURETHRAL RESECTION OF PROSTATE N/A 8/1/2018    Procedure: TRANSURETHRAL RESECTION OF PROSTATE (TURP);  Surgeon: Poly Marcum MD;  Location: [facility];  Service: Urology;  Laterality: N/A;    TRANSURETHRAL RESECTION OF PROSTATE (TURP) N/A 8/1/2018    Performed by [facility]rum [facility] at [facility] OR       Family History   Problem Relation Age of Onset    Cancer Mother         brain    Dementia Father         alzheimer's    Hypertension Father        Social History     Socioeconomic History    Marital status:      Spouse name: Not on file    Number of children: Not on file    Years of education: Not on file    Highest education  "level: Not on file   Social Needs    Financial resource strain: Not on file    Food insecurity - worry: Not on file    Food insecurity - inability: Not on file    Transportation needs - medical: Not on file    Transportation needs - non-medical: Not on file   Occupational History    Not on file   Tobacco Use    Smoking status: Current Every Day Smoker     Packs/day: 2.00     Years: 20.00     Pack years: 40.00    Smokeless tobacco: Never Used   Substance and Sexual Activity    Alcohol use: Yes     Comment: occasionally    Drug use: No    Sexual activity: Yes     Partners: Female   Other Topics Concern    Not on file   Social History Narrative    Not on file       Review of patient's allergies indicates:   Allergen Reactions    Lisinopril Itching       No current facility-administered medications on file prior to encounter.      Current Outpatient Medications on File Prior to Encounter   Medication Sig Dispense Refill    amLODIPine (NORVASC) 10 MG tablet Take 1 tablet (10 mg total) by mouth once daily. 90 tablet 3    aspirin (ECOTRIN) 81 MG EC tablet Take 1 tablet (81 mg total) by mouth once daily. 90 tablet 3    atorvastatin (LIPITOR) 40 MG tablet Take 1 tablet (40 mg total) by mouth every evening. 90 tablet 3    gabapentin (NEURONTIN) 300 MG capsule Take 1 capsule (300 mg total) by mouth every evening. 30 capsule 0    metoprolol succinate (TOPROL-XL) 100 MG 24 hr tablet Take 1 tablet (100 mg total) by mouth once daily. 90 tablet 3    polyethylene glycol (GLYCOLAX) 17 gram/dose powder 1/2 to 1 cap daily to prevent constipation on pain meds 255 g 0    oxybutynin (DITROPAN-XL) 10 MG 24 hr tablet Take 1 tablet (10 mg total) by mouth once daily. for 10 days 90 tablet 0       ROS: Negative x 10    Patient Vitals for the past 24 hrs:   BP Temp Pulse Resp SpO2 Height Weight   10/25/18 1005 (!) 145/79 97.8 °F (36.6 °C) (!) 49 20 97 % 5' 11" (1.803 m) 79.8 kg (176 lb)     Gen: Well developed, well " nourished, no apparent distress  HEENT: Anicteric, PERRL, MMM  CV: Regular rate and rhythm, no murmurs   Lungs: CTAB, no increased work of breathing  Abd: Soft, NT, ND, normal BS, no HSM  Ext: No C/C/E  Neuro: Alert and oriented to person, place, time; no weakness  Skin: No rashes/lesions  Psych: Normal mood and affect    Assessment:  Antolin Richardson is a 67 y.o. male who presents for surveillance colonoscopy for hx of polyps.    Recommendations:  1. Colonoscopy  2. F/U with PCP     Jackie Linares MD

## 2018-10-25 NOTE — ANESTHESIA POSTPROCEDURE EVALUATION
"Anesthesia Post Evaluation    Patient: Antolin Richardson    Procedure(s) Performed: Procedure(s) (LRB):  COLONOSCOPY (N/A)    Final Anesthesia Type: general  Patient location during evaluation: GI PACU  Patient participation: Yes- Able to Participate  Level of consciousness: awake and alert, awake and oriented  Post-procedure vital signs: reviewed and stable  Pain management: adequate  Airway patency: patent  PONV status at discharge: No PONV  Anesthetic complications: no      Cardiovascular status: blood pressure returned to baseline and hemodynamically stable  Respiratory status: unassisted, spontaneous ventilation and room air  Hydration status: euvolemic  Follow-up not needed.        Visit Vitals  BP (!) 148/79   Pulse (!) 48   Temp 36.4 °C (97.5 °F) (Oral)   Resp 20   Ht 5' 11" (1.803 m)   Wt 79.8 kg (176 lb)   SpO2 98%   BMI 24.55 kg/m²       Pain/Dinesh Score: Presence of Pain: denies (10/25/2018 12:09 PM)  Dinesh Score: 10 (10/25/2018 12:09 PM)        "

## 2018-10-25 NOTE — DISCHARGE INSTRUCTIONS
Colonoscopy     A camera attached to a flexible tube with a viewing lens is used to take video pictures.     Colonoscopy is a test to view the inside of your lower digestive tract (colon and rectum). Sometimes it can show the last part of the small intestine (ileum). During the test, small pieces of tissue may be removed for testing. This is called a biopsy. Small growths, such as polyps, may also be removed.   Why is colonoscopy done?  The test is done to help look for colon cancer. And it can help find the source of abdominal pain, bleeding, and changes in bowel habits. It may be needed once a year, depending on factors such as your:  · Age  · Health history  · Family health history  · Symptoms  · Results from any prior colonoscopy  Risks and possible complications  These include:  · Bleeding               · A puncture or tear in the colon   · Risks of anesthesia  · A cancer lesion not being seen  Getting ready   To prepare for the test:  · Talk with your healthcare provider about the risks of the test (see below). Also ask your healthcare provider about alternatives to the test.  · Tell your healthcare provider about any medicines you take. Also tell him or her about any health conditions you may have.  · Make sure your rectum and colon are empty for the test. Follow the diet and bowel prep instructions exactly. If you dont, the test may need to be rescheduled.  · Plan for a friend or family member to drive you home after the test.     Colonoscopy provides an inside view of the entire colon.     You may discuss the results with your doctor right away or at a future visit.  During the test   The test is usually done in the hospital on an outpatient basis. This means you go home the same day. The procedure takes about 30 minutes. During that time:  · You are given relaxing (sedating) medicine through an IV line. You may be drowsy, or fully asleep.  · The healthcare provider will first give you a physical exam to  check for anal and rectal problems.  · Then the anus is lubricated and the scope inserted.  · If you are awake, you may have a feeling similar to needing to have a bowel movement. You may also feel pressure as air is pumped into the colon. Its OK to pass gas during the procedure.  · Biopsy, polyp removal, or other treatments may be done during the test.  After the test   You may have gas right after the test. It can help to try to pass it to help prevent later bloating. Your healthcare provider may discuss the results with you right away. Or you may need to schedule a follow-up visit to talk about the results. After the test, you can go back to your normal eating and other activities. You may be tired from the sedation and need to rest for a few hours.  Date Last Reviewed: 11/1/2016 © 2000-2017 [employer]. [address] [place] [address], [place] [address]. All rights reserved. This information is not intended as a substitute for professional medical care. Always follow your healthcare professional's instructions.

## 2018-10-29 ENCOUNTER — CLINICAL SUPPORT (OUTPATIENT)
Dept: SMOKING CESSATION | Facility: CLINIC | Age: 68
End: 2018-10-29
Payer: COMMERCIAL

## 2018-10-29 ENCOUNTER — TELEPHONE (OUTPATIENT)
Dept: SURGERY | Facility: CLINIC | Age: 68
End: 2018-10-29

## 2018-10-29 DIAGNOSIS — F17.200 NICOTINE DEPENDENCE: Primary | ICD-10-CM

## 2018-10-29 PROCEDURE — 99406 BEHAV CHNG SMOKING 3-10 MIN: CPT | Mod: S$GLB,,,

## 2018-11-01 ENCOUNTER — OFFICE VISIT (OUTPATIENT)
Dept: SURGERY | Facility: CLINIC | Age: 68
End: 2018-11-01
Payer: MEDICARE

## 2018-11-01 VITALS
WEIGHT: 176 LBS | HEIGHT: 71 IN | SYSTOLIC BLOOD PRESSURE: 150 MMHG | BODY MASS INDEX: 24.64 KG/M2 | DIASTOLIC BLOOD PRESSURE: 89 MMHG | HEART RATE: 54 BPM

## 2018-11-01 DIAGNOSIS — I71.40 ABDOMINAL AORTIC ANEURYSM (AAA) WITHOUT RUPTURE: Primary | ICD-10-CM

## 2018-11-01 PROCEDURE — 1101F PT FALLS ASSESS-DOCD LE1/YR: CPT | Mod: CPTII,S$GLB,, | Performed by: SURGERY

## 2018-11-01 PROCEDURE — 99499 UNLISTED E&M SERVICE: CPT | Mod: S$GLB,,, | Performed by: SURGERY

## 2018-11-01 PROCEDURE — 3079F DIAST BP 80-89 MM HG: CPT | Mod: CPTII,S$GLB,, | Performed by: SURGERY

## 2018-11-01 PROCEDURE — 3008F BODY MASS INDEX DOCD: CPT | Mod: CPTII,S$GLB,, | Performed by: SURGERY

## 2018-11-01 PROCEDURE — 3077F SYST BP >= 140 MM HG: CPT | Mod: CPTII,S$GLB,, | Performed by: SURGERY

## 2018-11-05 ENCOUNTER — TELEPHONE (OUTPATIENT)
Dept: SMOKING CESSATION | Facility: CLINIC | Age: 68
End: 2018-11-05

## 2018-11-05 NOTE — TELEPHONE ENCOUNTER
[facility]- called to check on patient, no show for appointment. No answer, no voicemail available. .

## 2018-11-27 ENCOUNTER — TELEPHONE (OUTPATIENT)
Dept: UROLOGY | Facility: CLINIC | Age: 68
End: 2018-11-27

## 2018-11-27 NOTE — TELEPHONE ENCOUNTER
----- Message from Blanca Tanner sent at 11/27/2018 10:13 AM CST -----  Contact: Pt  Pt called requesting to reschedule his appointment for today. Pt. requested to speak to nurse.    Call back [phone]

## 2018-11-27 NOTE — TELEPHONE ENCOUNTER
Spoke with patient appointment rescheduled to Friday 11/30 at 8:45am. Patient verbally voiced understanding.

## 2018-11-29 ENCOUNTER — TELEPHONE (OUTPATIENT)
Dept: UROLOGY | Facility: CLINIC | Age: 68
End: 2018-11-29

## 2018-11-30 ENCOUNTER — OFFICE VISIT (OUTPATIENT)
Dept: UROLOGY | Facility: CLINIC | Age: 68
End: 2018-11-30
Payer: MEDICARE

## 2018-11-30 VITALS
DIASTOLIC BLOOD PRESSURE: 89 MMHG | BODY MASS INDEX: 25.51 KG/M2 | HEART RATE: 46 BPM | SYSTOLIC BLOOD PRESSURE: 166 MMHG | WEIGHT: 182.19 LBS | HEIGHT: 71 IN

## 2018-11-30 DIAGNOSIS — N13.8 BPH WITH OBSTRUCTION/LOWER URINARY TRACT SYMPTOMS: Primary | ICD-10-CM

## 2018-11-30 DIAGNOSIS — R82.89 ABNORMAL URINE CYTOLOGY: ICD-10-CM

## 2018-11-30 DIAGNOSIS — N39.41 URGE INCONTINENCE: ICD-10-CM

## 2018-11-30 DIAGNOSIS — N40.1 BPH WITH OBSTRUCTION/LOWER URINARY TRACT SYMPTOMS: Primary | ICD-10-CM

## 2018-11-30 DIAGNOSIS — N35.010 POST-TRAUMATIC MALE URETHRAL MEATAL STRICTURE: ICD-10-CM

## 2018-11-30 DIAGNOSIS — R31.0 GROSS HEMATURIA: ICD-10-CM

## 2018-11-30 LAB
BILIRUB SERPL-MCNC: ABNORMAL MG/DL
BLOOD URINE, POC: ABNORMAL
COLOR, POC UA: YELLOW
GLUCOSE UR QL STRIP: ABNORMAL
KETONES UR QL STRIP: ABNORMAL
LEUKOCYTE ESTERASE URINE, POC: ABNORMAL
NITRITE, POC UA: ABNORMAL
PH, POC UA: 5
PROTEIN, POC: ABNORMAL
SPECIFIC GRAVITY, POC UA: 1.02
UROBILINOGEN, POC UA: ABNORMAL

## 2018-11-30 PROCEDURE — 3077F SYST BP >= 140 MM HG: CPT | Mod: CPTII,S$GLB,, | Performed by: UROLOGY

## 2018-11-30 PROCEDURE — 87186 SC STD MICRODIL/AGAR DIL: CPT

## 2018-11-30 PROCEDURE — 51725 SIMPLE CYSTOMETROGRAM: CPT | Mod: S$GLB,,, | Performed by: UROLOGY

## 2018-11-30 PROCEDURE — 81002 URINALYSIS NONAUTO W/O SCOPE: CPT | Mod: S$GLB,,, | Performed by: UROLOGY

## 2018-11-30 PROCEDURE — 99999 PR PBB SHADOW E&M-EST. PATIENT-LVL III: CPT | Mod: PBBFAC,,, | Performed by: UROLOGY

## 2018-11-30 PROCEDURE — 1101F PT FALLS ASSESS-DOCD LE1/YR: CPT | Mod: CPTII,S$GLB,, | Performed by: UROLOGY

## 2018-11-30 PROCEDURE — 87088 URINE BACTERIA CULTURE: CPT

## 2018-11-30 PROCEDURE — 87086 URINE CULTURE/COLONY COUNT: CPT

## 2018-11-30 PROCEDURE — 99215 OFFICE O/P EST HI 40 MIN: CPT | Mod: 25,S$GLB,, | Performed by: UROLOGY

## 2018-11-30 PROCEDURE — 3079F DIAST BP 80-89 MM HG: CPT | Mod: CPTII,S$GLB,, | Performed by: UROLOGY

## 2018-11-30 PROCEDURE — 87077 CULTURE AEROBIC IDENTIFY: CPT

## 2018-11-30 NOTE — PATIENT INSTRUCTIONS
"BPH with LuTS and recurrent prostatitis/uti s/p turp 8/1/18 with OAB (present prior) and UUI   -stress test negative today with 200 in bladder, slightly delayed sensation and stable bladder. 110cc. I think he is having urge incontinence which is new since surgery and unresolved OAB that he had priory to surgery not improved with TURP or ditropan.   -continue ditropan/oxybutynin 10mg daily for overactive bladder. No SE from this.   -add myrbetriq 50mg once daily, takes 4 to 6 weeks. Call insurance if too expensive and call us back. List given  -no repeat turp needed  -went over decreased semen volume as a result of TURP.    -send catherized urine for culture, asymptomatic but will treat let me know. Take 1 abx when he gets home    Return in 2 months to see if symptoms improve on ditropan and myrbetriq.   Next psa due in march, order at next visit  MONE done today     Gross hematuria and abnormal urine cytology, chronic OAB  -already had a cta in 11/2017. cysto trus was negative 4/2018   -urine cytology (h/o smoking) 2/2018 negative, 6/2018 and 8/2018 showed atypical cells. F/u cysto 10/2018 showed no tumor. Smoker so will continue to follow - changing to chntix.   -consider repeat upper tract imaging at some point if hematuria recurs or cytology remains abnormal . Last ct 11/2017.       Urethral stricture  -continue to dilate once a week to 4" in with 16fr    F/u in 2 months    F/u in 2 months    "

## 2018-11-30 NOTE — PROGRESS NOTES
[facility] Note - [place]  Staff: MD Trixie    Referring provider and please cc: self  PC[facility]     Chief Complaint: gross hematuria and frequency    Subjective:        HPI: Antolin Richardson is a 68 y.o. male presents with     OAB, BPH, recurrent uti/prostatitis/meatal stricture  -he initially came to see me 2/27/18 for frequency q20 to 30 minutes, weak stream and sometimes has sprayed stream. He had never been on any meds for his prostate and denied any hesistancy. He states that the sx had occurred since surgery (AAA repair and had catheter during this surgery) in November however CTA shows a very enlarged prostate. 6x5cm in transverse with large intravesical lobe. He also drinks 5 cups of coffee in the morning and 2 cups of tea in the pm. I started him on flomax 0.4mg nightly and he had a uroflow 3/26/18 which showed a mean flow of 6cc with voided volume of 70 and pvr of 17. I did a cysto trus on 4/2/18 which showed a 99g prostate with a large circumferential median lobe. PSA was initially 3.7 and repated it and it came down to 3.0 with 30% free. I referred him to  to discuss robotic simple prostatectomy however he was worried insurance would not cover visit and did not go to appt. He returns today and states that he has burning with urination, oab and denies incomplete emptying. He has had prostatitis/uti at least twice now but has never been on abx for longer than 10 days. Repeated uroflow today but not evnough voided volume (59cc). pvr by in and out cath today: 30cc    AUA ssx 6/21/18:(5 incomplete emptying, 5 frequency, 5 intermittency, 5 urgency, 5 weak stream, 0 straining, 5 sleeping). 30. QOL: unhappy  -underwent meatotomy, cysto b rgp and turp on 8/1/18. Path negative. 36g resected. postop 8/9/18 pvr was 61cc. Taught how to dilate meatus. Seen in f/u 9/25/18 having oab with frequency q20 to 30 minutes which is not new but with some  urgency and urge incontinence which is new since surgery. He has been dilating 2x a day. He has discontinued finasteride and was still on flomax. He states he's been having pain at the end of urination in his lower abdomen and he is still on abx. I&0 cath 16fr coude but had meatal stricture recurrence. I had to push through this. Then dilated with a 16fr and then 18fr. I showed him where it was catching. Residual 20cc. Started him on ditropan 10mg XL.   -10/22/18 cystoscopy bc of atypical urine cells and found stricture 2cm in from meatus.dilated to 24 fr and taught to dilate with catheter one daily, did this until he ran out of [employer] 2 weeks ago. flomax discontinued.     During the day still voiding every 1 hour on ditropan and leaking continuously, especially with stress. Not wearing pads but changing underwear 3x a day. Drops 1/2 hour after voiding. Leaks at night with coughing- flow. Good stream.     Stress test negative today with 200 in bladder, bladder stable.    psa history  8/1/18  Turp chips: 36g, b9 (no cancer)  4/2/18              trus volume: 99g  3/19/18            3.0, %free 29.67  2/27/18            3.8, % free 31.35, Marlon: no nodules, 40+g prostate     Gross hematuria  -he started having intermittent GH in 2/2018.  Workup revealed likely from prostate. On asa 81mg daily. Circumcised. Denies any new frequency that is new or worse, denies any dysuria.  -cta 11/2017 shows no stones. cystoscopy trus 4/2/18: 99g prostate, no tumors.  -cytology 2/27/18: negative, +tobacco use (1.5 ppd x 50 years and still smokes). Repeat cytology 6/21/18: negative for malignancy, rare atypical cells. Repeat cytology 9/25/18 atypical cells  -cystoscopy 10/22/18: no tumors seen in bladder. +urethral stricture    Denies any recurrent gross hematuria. Back on blood thinners.      ua today void: tr prot/tr blood   ua cath: sent for culture- no sx today   ua history:   10/15/18 Proteus, Void:2+bld/1+leuk, 15 wbc/10  rbc  9/25/18 NG, Cath: 3+bld/1+leuk, 15 wbc/5 rbc, Cytology: urothelial atypia  7/25/18            Ng, cath: negative  7/9/18              Ng, void: 3+blood/tr protein  6/21/18            Coag neg staph, void and cath: nit+/3+blood/1+leuk, pvr I&0: 30cc, cytology: rare atypical cells, neg for malignancy.   4/2/18              No cx, void: 1+leuk and blood   3/31/18            Ng, void: negative  3/26/18            Coag neg staph, void: 2+ wbc, nit+, 50 blood - bactrim twice a day for 10 days  3/19/18            Ng, void: 1+ blood  2/27/18            Multiple org, void: 1+wbc, tr blood (bactrim x 7d), cytology: negative   ua today +, c/o burning     ED (did not discuss today)  -occ problems with erections.   -uses viagra  -IIEF: 13cc    REVIEW OF SYSTEMS:  General ROS: no fevers, no chills  Psychological ROS: no depression  Endocrine ROS: no heat or cold  Respiratory ROS: no SOB  Cardiovascular ROS: no CP  Gastrointestinal ROS: no abdominal pain, no constipation, no diarrhea, no BRBPR  Musculoskeletal ROS: no muscle pain  Neurological ROS: no headaches  Dermatological ROS: no rashes  HEENT: noglasses, no sinus   ROS: per HPI     PMHx:  Past Medical History:   Diagnosis Date    AAA (abdominal aortic aneurysm) 12/2016    s/p endo repair ([facility])    Acid reflux     Anxiety     Coronary artery disease     Coronary artery disease involving native coronary artery of native heart without angina pectoris 1/3/2017    Hypertension     Myocardial infarction      Kidney stones: No  Cataracts? none    PSHx:  Past Surgical History:   Procedure Laterality Date    ABDOMINAL AORTIC ANEURYSM REPAIR      APPENDECTOMY      COLONOSCOPY N/A 10/25/2018    Procedure: COLONOSCOPY;  Surgeon: Jackie Linares MD;  Location: [facility] ENDO;  Service: Endoscopy;  Laterality: N/A;    COLONOSCOPY N/A 10/25/2018    Performed by Jackie Linares MD at [facility]    CORONARY ANGIOPLASTY  2009    coronary artery stent      x1    CYSTOSCOPY N/A  10/22/2018    Procedure: CYSTOSCOPY;  Surgeon: Poly Marcum MD;  Location: [facility] OR;  Service: Urology;  Laterality: N/A;    CYSTOSCOPY N/A 10/22/2018    Performed by Poly Marcum MD at [facility]    CYSTOSCOPY N/A 2018    Performed by Poly [facility]um MD at [facility]    CYSTOSCOPY  3/26/2018    Performed by Poly [facility]um MD at [facility]    REPAIR ABDOMINAL-AORTIC ANEURYSM-ENDOVASCULAR GRAFT (AAA) (C-ARM) N/A 2016    Performed by Agustin Ch[facility]al MD at [facility] OR    stent for abdominal aneurysm      TRANSRECTAL ULTRASOUND N/A 2018    [facility]formed by Poly [facility]um MD at [facility]    TRANSRECTAL ULTRASOUND  3/26/2018    Performed by Poly [facility]um MD at [facility]    TRANSURETHRAL RESECTION OF PROSTATE N/A 2018    Procedure: TRANSURETHRAL RESECTION OF PROSTATE (TURP);  Surgeon: Poly Marcum MD;  Location: [facility] OR;  Service: Urology;  Laterality: N/A;    TRANSURETHRAL RESECTION OF PROSTATE (TURP) N/A 2018    Performed by Poly Marcum MD at [facility] OR     Stents/Valves/Foreign Bodies: Yes - last placed in   Cardiac Evaluation: Yes -     Screening Studies  Colonoscopy:  found polyps    Fam Hx:   malignancies: No  . Gyn malignancies: no. Parents  and 80s and mother had brain cancer  kidney stones: No     Soc Hx:  +tobacco.  1.5 pk per day x 50 year  occ alcohol  Lives in [place]  :   Children: 3  Occupation:retired from security    Allergies:  Lisinopril    Urologic meds: flomax 0.4mg nightly, finasteride  Anticoagulation: Yes - asa 81mg daily     Objective:     Vitals:    18 0856   BP: (!) 166/89   Pulse: (!) 46         General:WDWN in NAD  Eyes: PERRLA, normal conjunctiva  Respiratory: No increased work on breathing.   Cardiovascular: No obvious extremity edema. Warm and well perfused.   GI: palpation of masses. No tenderness. No hepatosplenomegaly to palpation.  Musculoskeletal:  normal range of motion of bilateral upper extremities. Normal muscle strength and tone.  Skin: no obvious rashes or lesions. No tightening of skin noted.  Neurologic: CN grossly normal. Normal sensation.   Psychiatric: awake, alert and oriented x 3. Mood and affect normal. Cooperative.      And stress test today 11/30/18  Penis is circumcised,    MONE today: 40g no nodules, slightly tender.    Stress test:   negative stress test with coughing supine, negative stress test with valsalva supine  In and out cath performed with 10cc residual - urine was sent for sample  Bladder filled to 150 cc  Sensation to void felt at 120 cc  Catheter removed  Negative stress test with coughing supine, negative stress test with valsalva supine  negativestress test with coughing or valsalva  Standing  Catheter inserted again and filled to 200 and pt very uncomfortable  Catheter removed  Negative stress test with coughing, valsava, supine and standing.     LABS REVIEW:  [facility]  Lab Results   Component Value Date     09/28/2018    K 4.1 09/28/2018     09/28/2018    CO2 22 (L) 09/28/2018    BUN 13 09/28/2018    CREATININE 1.2 09/28/2018    CALCIUM 9.3 09/28/2018    ANIONGAP 11 09/28/2018    ESTGFRAFRICA >60 09/28/2018    EGFRNONAA >60 09/28/2018     Lab Results   Component Value Date    WBC 11.00 09/28/2018    HGB 16.2 09/28/2018    HCT 47.8 09/28/2018    MCV 87 09/28/2018     09/28/2018         PATHOLOGY REVIEW:  Urine cytology 9/25/18  -Urothelial atypia; few groups of atypical urothelial cells, benign and reactive urothelial cells, benign squamous cells  and mild to moderate acute inflammation (see comment).  COMMENT: The atypical urothelial cells seen in this specimen most likely represent reactive atypia. However, this  cannot be determined with absolute certainty. Clinical correlation, close clinical follow-up and additional testing is  Recommended.    PROSTATE, TRANSURETHRAL RESECTION (TURP) 8/1/18  -Benign  prostatic tissue with nodular hyperplasia (36 gm)    Urine, catheterized 6/21/18  Negative for malignancy.  Rare atypical cells present.  Background of rbc's, bacteria and acute inflammatory cells.  Correlate clinically.    Urine 2/27/17: Negative for malignant cells. Will discuss at f/u    RADIOGRAPHIC REVIEW:  cta 11/20/17 images reviewed by me  -no stones  -enlarged prostate    Interval placement of aortobiiliac endovascular stent graft which is patent with no extravasation and with partial collapse of the native surrounding aneurysm sac.    Additional findings as detailed above including enlarged prostate gland, diverticulosis without CT findings of acute diverticulitis, left renal masses suggesting low density and high density cysts.    Assessment:       1. BPH with obstruction/lower urinary tract symptoms    2. Urge incontinence    3. Gross hematuria    4. Abnormal urine cytology    5. 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 110cc. I think he is having urge incontinence which is new since surgery and unresolved OAB that he had priory to surgery not improved with TURP or ditropan.   -continue ditropan/oxybutynin 10mg daily for overactive bladder. No SE from this.   -add myrbetriq 50mg once daily, takes 4 to 6 weeks. Call insurance if too expensive and call us back. List given  -no repeat turp needed  -went over decreased semen volume as a result of TURP.    -send catherized urine for culture, asymptomatic but will treat let me know. Take 1 abx when he gets home    Return in 2 months to see if symptoms improve on ditropan and myrbetriq.   Next psa due in march, order at next visit  MONE done today     Gross hematuria and abnormal urine cytology, chronic OAB  -already had a cta in 11/2017. cysto trus  "was negative 4/2018   -urine cytology (h/o smoking) 2/2018 negative, 6/2018 and 8/2018 showed atypical cells. F/u cysto 10/2018 showed no tumor. Smoker so will continue to follow - changing to chntix.   -consider repeat upper tract imaging at some point if hematuria recurs or cytology remains abnormal . Last ct 11/2017.       Urethral stricture  -continue to dilate once a week to 4" in with 16fr    F/u in 2 months      Poly Marcum MD  "

## 2018-12-03 ENCOUNTER — TELEPHONE (OUTPATIENT)
Dept: UROLOGY | Facility: CLINIC | Age: 68
End: 2018-12-03

## 2018-12-03 DIAGNOSIS — R31.0 GROSS HEMATURIA: Primary | ICD-10-CM

## 2018-12-03 LAB — BACTERIA UR CULT: NORMAL

## 2018-12-03 RX ORDER — AMOXICILLIN AND CLAVULANATE POTASSIUM 875; 125 MG/1; MG/1
1 TABLET, FILM COATED ORAL 2 TIMES DAILY
Qty: 28 TABLET | Refills: 0 | Status: SHIPPED | OUTPATIENT
Start: 2018-12-03 | End: 2018-12-17

## 2018-12-03 NOTE — PROGRESS NOTES
Let him know he has another positive urine culture. Sometimes this particular bacteria is seen in kidney stones. I would like to order a ct urogram to further evaluate for stones/tumors (has ).

## 2018-12-03 NOTE — TELEPHONE ENCOUNTER
Spoke with patient he states insurance will not cover myrbetriq but they will cover vesicare or topiaz. Please advise

## 2018-12-03 NOTE — TELEPHONE ENCOUNTER
Ask him to get the ct first since he has another proteus uti. Also ask him to augmentin bid x 7d and see if his leakage improves with this since uti can cause leakage.     If his ct is negative and his sx do not improve with treatment of uti then will start one of the oab meds (vesicare or toviaz)

## 2018-12-03 NOTE — TELEPHONE ENCOUNTER
Phoned patient informed him of results and recommendations. Patient verbally voiced understanding. Patient already scheduled by phil Ambrosio for ct

## 2018-12-03 NOTE — TELEPHONE ENCOUNTER
----- Message from Sandie Alvarez sent at 12/3/2018  8:17 AM CST -----  Contact: pt  Pt states that he needs to talk to someone in the office regarding prescription got the other day ,the plan only covering 2 of the prescriptions,was told by insurance to get back the office also the Dr told him to call back to give the one the insurance take if they..[phone] (home)

## 2018-12-05 ENCOUNTER — HOSPITAL ENCOUNTER (OUTPATIENT)
Dept: RADIOLOGY | Facility: HOSPITAL | Age: 68
Discharge: HOME | End: 2018-12-05
Attending: UROLOGY
Payer: MEDICARE

## 2018-12-05 ENCOUNTER — TELEPHONE (OUTPATIENT)
Dept: SMOKING CESSATION | Facility: CLINIC | Age: 68
End: 2018-12-05

## 2018-12-05 DIAGNOSIS — R31.0 GROSS HEMATURIA: ICD-10-CM

## 2018-12-05 PROCEDURE — 25500020 PHARM REV CODE 255

## 2018-12-05 PROCEDURE — 74178 CT ABD&PLV WO CNTR FLWD CNTR: CPT | Mod: TC

## 2018-12-05 PROCEDURE — 74178 CT ABD&PLV WO CNTR FLWD CNTR: CPT | Mod: 26,,, | Performed by: RADIOLOGY

## 2018-12-05 RX ORDER — SODIUM CHLORIDE 9 MG/ML
INJECTION, SOLUTION INTRAVENOUS
Status: DISPENSED
Start: 2018-12-05 | End: 2018-12-05

## 2018-12-05 RX ADMIN — IOHEXOL 100 ML: 350 INJECTION, SOLUTION INTRAVENOUS at 09:12

## 2018-12-05 NOTE — TELEPHONE ENCOUNTER
[facility]- called to check on patient, no show for last appointment. Left message to call back to reschedule [phone] .

## 2019-01-25 ENCOUNTER — TELEPHONE (OUTPATIENT)
Dept: UROLOGY | Facility: CLINIC | Age: 69
End: 2019-01-25

## 2019-01-25 NOTE — TELEPHONE ENCOUNTER
----- Message from Vernon Potter sent at 1/25/2019  7:15 AM CST -----  Contact: pt  Pt is requesting to reschedule their appointment.    Date of current appointment: 1.31.19  Reason for reschedule: pt will be out of town.  Additional information: pt will be back in town 2.15.19    Call back: [phone]  thanks

## 2019-02-21 ENCOUNTER — LAB VISIT (OUTPATIENT)
Dept: LAB | Facility: HOSPITAL | Age: 69
End: 2019-02-21
Attending: INTERNAL MEDICINE
Payer: MEDICARE

## 2019-02-21 ENCOUNTER — OFFICE VISIT (OUTPATIENT)
Dept: FAMILY MEDICINE | Facility: CLINIC | Age: 69
End: 2019-02-21
Payer: MEDICARE

## 2019-02-21 VITALS
DIASTOLIC BLOOD PRESSURE: 106 MMHG | OXYGEN SATURATION: 97 % | BODY MASS INDEX: 25.4 KG/M2 | HEIGHT: 71 IN | HEART RATE: 80 BPM | TEMPERATURE: 98 F | SYSTOLIC BLOOD PRESSURE: 194 MMHG | WEIGHT: 181.44 LBS

## 2019-02-21 DIAGNOSIS — Z01.89 ENCOUNTER FOR OTHER SPECIFIED SPECIAL EXAMINATIONS: ICD-10-CM

## 2019-02-21 DIAGNOSIS — Z11.4 ENCOUNTER FOR SCREENING FOR HIV: ICD-10-CM

## 2019-02-21 DIAGNOSIS — I71.40 ABDOMINAL AORTIC ANEURYSM (AAA) WITHOUT RUPTURE: ICD-10-CM

## 2019-02-21 DIAGNOSIS — Z20.2 POSSIBLE EXPOSURE TO STD: ICD-10-CM

## 2019-02-21 DIAGNOSIS — Z12.9 SCREENING FOR CANCER: ICD-10-CM

## 2019-02-21 DIAGNOSIS — I10 HYPERTENSION, ESSENTIAL: ICD-10-CM

## 2019-02-21 DIAGNOSIS — Z87.891 PERSONAL HISTORY OF NICOTINE DEPENDENCE: ICD-10-CM

## 2019-02-21 DIAGNOSIS — Z20.2 POSSIBLE EXPOSURE TO STD: Primary | ICD-10-CM

## 2019-02-21 LAB
CHOLEST SERPL-MCNC: 187 MG/DL
CHOLEST/HDLC SERPL: 5.2 {RATIO}
HDLC SERPL-MCNC: 36 MG/DL
HDLC SERPL: 19.3 %
LDLC SERPL CALC-MCNC: 103.2 MG/DL
NONHDLC SERPL-MCNC: 151 MG/DL
TRIGL SERPL-MCNC: 239 MG/DL

## 2019-02-21 PROCEDURE — 99999 PR PBB SHADOW E&M-EST. PATIENT-LVL III: ICD-10-PCS | Mod: PBBFAC,,, | Performed by: INTERNAL MEDICINE

## 2019-02-21 PROCEDURE — 3080F PR MOST RECENT DIASTOLIC BLOOD PRESSURE >= 90 MM HG: ICD-10-PCS | Mod: CPTII,S$GLB,, | Performed by: INTERNAL MEDICINE

## 2019-02-21 PROCEDURE — 99999 PR PBB SHADOW E&M-EST. PATIENT-LVL III: CPT | Mod: PBBFAC,,, | Performed by: INTERNAL MEDICINE

## 2019-02-21 PROCEDURE — 3077F SYST BP >= 140 MM HG: CPT | Mod: CPTII,S$GLB,, | Performed by: INTERNAL MEDICINE

## 2019-02-21 PROCEDURE — 1101F PR PT FALLS ASSESS DOC 0-1 FALLS W/OUT INJ PAST YR: ICD-10-PCS | Mod: CPTII,S$GLB,, | Performed by: INTERNAL MEDICINE

## 2019-02-21 PROCEDURE — 3080F DIAST BP >= 90 MM HG: CPT | Mod: CPTII,S$GLB,, | Performed by: INTERNAL MEDICINE

## 2019-02-21 PROCEDURE — 99213 OFFICE O/P EST LOW 20 MIN: CPT | Mod: S$GLB,,, | Performed by: INTERNAL MEDICINE

## 2019-02-21 PROCEDURE — 99213 PR OFFICE/OUTPT VISIT, EST, LEVL III, 20-29 MIN: ICD-10-PCS | Mod: S$GLB,,, | Performed by: INTERNAL MEDICINE

## 2019-02-21 PROCEDURE — 80061 LIPID PANEL: CPT

## 2019-02-21 PROCEDURE — 36415 COLL VENOUS BLD VENIPUNCTURE: CPT | Mod: PO

## 2019-02-21 PROCEDURE — 80074 ACUTE HEPATITIS PANEL: CPT

## 2019-02-21 PROCEDURE — 1101F PT FALLS ASSESS-DOCD LE1/YR: CPT | Mod: CPTII,S$GLB,, | Performed by: INTERNAL MEDICINE

## 2019-02-21 PROCEDURE — 86703 HIV-1/HIV-2 1 RESULT ANTBDY: CPT

## 2019-02-21 PROCEDURE — 3077F PR MOST RECENT SYSTOLIC BLOOD PRESSURE >= 140 MM HG: ICD-10-PCS | Mod: CPTII,S$GLB,, | Performed by: INTERNAL MEDICINE
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
## 2019-02-22 DIAGNOSIS — E78.1 PURE HYPERGLYCERIDEMIA: Primary | ICD-10-CM

## 2019-02-22 LAB
HAV IGM SERPL QL IA: NEGATIVE
HBV CORE IGM SERPL QL IA: NEGATIVE
HBV SURFACE AG SERPL QL IA: NEGATIVE
HCV AB SERPL QL IA: NEGATIVE
HIV 1+2 AB+HIV1 P24 AG SERPL QL IA: NEGATIVE

## 2019-02-22 RX ORDER — FENOFIBRATE 160 MG/1
160 TABLET ORAL DAILY
Qty: 90 TABLET | Refills: 3 | Status: SHIPPED | OUTPATIENT
Start: 2019-02-22 | End: 2020-10-15 | Stop reason: SDUPTHER

## 2019-02-27 ENCOUNTER — HOSPITAL ENCOUNTER (OUTPATIENT)
Dept: RADIOLOGY | Facility: HOSPITAL | Age: 69
Discharge: HOME | End: 2019-02-27
Attending: INTERNAL MEDICINE
Payer: MEDICARE

## 2019-02-27 DIAGNOSIS — Z87.891 PERSONAL HISTORY OF NICOTINE DEPENDENCE: ICD-10-CM

## 2019-02-27 DIAGNOSIS — Z12.9 SCREENING FOR CANCER: ICD-10-CM

## 2019-02-27 PROCEDURE — G0297 LDCT FOR LUNG CA SCREEN: HCPCS | Mod: 26,,, | Performed by: RADIOLOGY

## 2019-02-27 PROCEDURE — G0297 CT CHEST LUNG SCREENING LOW DOSE: ICD-10-PCS | Mod: 26,,, | Performed by: RADIOLOGY

## 2019-02-27 PROCEDURE — G0297 LDCT FOR LUNG CA SCREEN: HCPCS | Mod: TC

## 2019-03-06 ENCOUNTER — CLINICAL SUPPORT (OUTPATIENT)
Dept: FAMILY MEDICINE | Facility: CLINIC | Age: 69
End: 2019-03-06
Payer: MEDICARE

## 2019-03-06 VITALS — SYSTOLIC BLOOD PRESSURE: 122 MMHG | DIASTOLIC BLOOD PRESSURE: 70 MMHG

## 2019-03-06 DIAGNOSIS — I10 HYPERTENSION, ESSENTIAL: Primary | ICD-10-CM

## 2019-03-06 PROCEDURE — 99499 NO LOS: ICD-10-PCS | Mod: S$GLB,,, | Performed by: INTERNAL MEDICINE

## 2019-03-06 PROCEDURE — 99499 UNLISTED E&M SERVICE: CPT | Mod: S$GLB,,, | Performed by: INTERNAL MEDICINE

## 2019-03-06 NOTE — PROGRESS NOTES
Antolin Wells Cyr 68 y.o. male is here today for Blood Pressure check.   History of HTN yes.    Review of patient's allergies indicates:   Allergen Reactions    Lisinopril Itching     Creatinine   Date Value Ref Range Status   12/05/2018 1.2 0.5 - 1.4 mg/dL Final     Sodium   Date Value Ref Range Status   09/28/2018 141 136 - 145 mmol/L Final     Potassium   Date Value Ref Range Status   09/28/2018 4.1 3.5 - 5.1 mmol/L Final   ]  Patient verifies taking blood pressure medications on a regular basis at the same time of the day.     Current Outpatient Medications:     amLODIPine (NORVASC) 10 MG tablet, Take 1 tablet (10 mg total) by mouth once daily., Disp: 90 tablet, Rfl: 3    aspirin (ECOTRIN) 81 MG EC tablet, Take 1 tablet (81 mg total) by mouth once daily., Disp: 90 tablet, Rfl: 3    atorvastatin (LIPITOR) 40 MG tablet, Take 1 tablet (40 mg total) by mouth every evening., Disp: 90 tablet, Rfl: 3    fenofibrate 160 MG Tab, Take 1 tablet (160 mg total) by mouth once daily., Disp: 90 tablet, Rfl: 3    gabapentin (NEURONTIN) 300 MG capsule, Take 1 capsule (300 mg total) by mouth every evening., Disp: 30 capsule, Rfl: 0    metoprolol succinate (TOPROL-XL) 100 MG 24 hr tablet, Take 1 tablet (100 mg total) by mouth once daily., Disp: 90 tablet, Rfl: 3    mirabegron (MYRBETRIQ) 50 mg Tb24, Take 1 tablet (50 mg total) by mouth once daily., Disp: 90 tablet, Rfl: 1    oxybutynin (DITROPAN-XL) 10 MG 24 hr tablet, Take 1 tablet (10 mg total) by mouth once daily. for 10 days, Disp: 90 tablet, Rfl: 0    polyethylene glycol (GLYCOLAX) 17 gram/dose powder, 1/2 to 1 cap daily to prevent constipation on pain meds, Disp: 255 g, Rfl: 0  Does patient have record of home blood pressure readings no.   Last dose of blood pressure medication was taken at 8 AM this morning.  Patient is asymptomatic.   No complaints.    Vitals:    03/06/19 0842   BP: 122/70   BP Location: Right arm   Patient Position: Sitting   BP Method: Small  (Manual)         Dr. Cheung informed of nurse visit.

## 2019-03-21 ENCOUNTER — TELEPHONE (OUTPATIENT)
Dept: UROLOGY | Facility: CLINIC | Age: 69
End: 2019-03-21

## 2019-03-21 ENCOUNTER — OFFICE VISIT (OUTPATIENT)
Dept: UROLOGY | Facility: CLINIC | Age: 69
End: 2019-03-21
Payer: MEDICARE

## 2019-03-21 ENCOUNTER — APPOINTMENT (OUTPATIENT)
Dept: LAB | Facility: HOSPITAL | Age: 69
End: 2019-03-21
Attending: UROLOGY
Payer: MEDICARE

## 2019-03-21 VITALS
HEIGHT: 71 IN | DIASTOLIC BLOOD PRESSURE: 74 MMHG | HEART RATE: 48 BPM | WEIGHT: 180.75 LBS | SYSTOLIC BLOOD PRESSURE: 145 MMHG | BODY MASS INDEX: 25.31 KG/M2

## 2019-03-21 DIAGNOSIS — N39.0 RECURRENT UTI: ICD-10-CM

## 2019-03-21 DIAGNOSIS — R97.20 ELEVATED PROSTATE SPECIFIC ANTIGEN (PSA): ICD-10-CM

## 2019-03-21 DIAGNOSIS — R31.29 MICROSCOPIC HEMATURIA: ICD-10-CM

## 2019-03-21 DIAGNOSIS — R97.20 ELEVATED PSA: ICD-10-CM

## 2019-03-21 DIAGNOSIS — N40.0 BENIGN PROSTATIC HYPERPLASIA, UNSPECIFIED WHETHER LOWER URINARY TRACT SYMPTOMS PRESENT: ICD-10-CM

## 2019-03-21 DIAGNOSIS — N32.81 OAB (OVERACTIVE BLADDER): Primary | ICD-10-CM

## 2019-03-21 DIAGNOSIS — N35.010 POST-TRAUMATIC MALE URETHRAL MEATAL STRICTURE: ICD-10-CM

## 2019-03-21 LAB
AMORPH CRY URNS QL MICRO: ABNORMAL
BACTERIA #/AREA URNS HPF: ABNORMAL /HPF
BILIRUB SERPL-MCNC: ABNORMAL MG/DL
BILIRUB UR QL STRIP: NEGATIVE
BLOOD URINE, POC: ABNORMAL
CLARITY UR: ABNORMAL
COLOR UR: YELLOW
COLOR, POC UA: ABNORMAL
GLUCOSE UR QL STRIP: ABNORMAL
GLUCOSE UR QL STRIP: NEGATIVE
HGB UR QL STRIP: ABNORMAL
HYALINE CASTS #/AREA URNS LPF: 0 /LPF
KETONES UR QL STRIP: ABNORMAL
KETONES UR QL STRIP: NEGATIVE
LEUKOCYTE ESTERASE UR QL STRIP: ABNORMAL
LEUKOCYTE ESTERASE URINE, POC: ABNORMAL
MICROSCOPIC COMMENT: ABNORMAL
NITRITE UR QL STRIP: NEGATIVE
NITRITE, POC UA: ABNORMAL
PH UR STRIP: 6 [PH] (ref 5–8)
PH, POC UA: 6
PROT UR QL STRIP: ABNORMAL
PROTEIN, POC: 30
RBC #/AREA URNS HPF: 0 /HPF (ref 0–4)
SP GR UR STRIP: 1.02 (ref 1–1.03)
SPECIFIC GRAVITY, POC UA: 1.02
URN SPEC COLLECT METH UR: ABNORMAL
UROBILINOGEN UR STRIP-ACNC: NEGATIVE EU/DL
UROBILINOGEN, POC UA: ABNORMAL
WBC #/AREA URNS HPF: 4 /HPF (ref 0–5)

## 2019-03-21 PROCEDURE — 88112 CYTOPATH CELL ENHANCE TECH: CPT | Mod: 26,,, | Performed by: PATHOLOGY

## 2019-03-21 PROCEDURE — 81002 POCT URINE DIPSTICK WITHOUT MICROSCOPE: ICD-10-PCS | Mod: S$GLB,,, | Performed by: UROLOGY

## 2019-03-21 PROCEDURE — 99999 PR PBB SHADOW E&M-EST. PATIENT-LVL III: CPT | Mod: PBBFAC,,, | Performed by: UROLOGY

## 2019-03-21 PROCEDURE — 3078F DIAST BP <80 MM HG: CPT | Mod: CPTII,S$GLB,, | Performed by: UROLOGY

## 2019-03-21 PROCEDURE — 81002 URINALYSIS NONAUTO W/O SCOPE: CPT | Mod: S$GLB,,, | Performed by: UROLOGY

## 2019-03-21 PROCEDURE — 88112 CYTOLOGY SPECIMEN-URINE: ICD-10-PCS | Mod: 26,,, | Performed by: PATHOLOGY

## 2019-03-21 PROCEDURE — 87086 URINE CULTURE/COLONY COUNT: CPT

## 2019-03-21 PROCEDURE — 81000 URINALYSIS NONAUTO W/SCOPE: CPT

## 2019-03-21 PROCEDURE — 99215 OFFICE O/P EST HI 40 MIN: CPT | Mod: 25,S$GLB,, | Performed by: UROLOGY

## 2019-03-21 PROCEDURE — 3077F PR MOST RECENT SYSTOLIC BLOOD PRESSURE >= 140 MM HG: ICD-10-PCS | Mod: CPTII,S$GLB,, | Performed by: UROLOGY

## 2019-03-21 PROCEDURE — 3077F SYST BP >= 140 MM HG: CPT | Mod: CPTII,S$GLB,, | Performed by: UROLOGY

## 2019-03-21 PROCEDURE — 88112 CYTOPATH CELL ENHANCE TECH: CPT | Performed by: PATHOLOGY

## 2019-03-21 PROCEDURE — 99499 RISK ADDL DX/OHS AUDIT: ICD-10-PCS | Mod: S$GLB,,, | Performed by: UROLOGY

## 2019-03-21 PROCEDURE — 99499 UNLISTED E&M SERVICE: CPT | Mod: S$GLB,,, | Performed by: UROLOGY

## 2019-03-21 PROCEDURE — 1101F PT FALLS ASSESS-DOCD LE1/YR: CPT | Mod: CPTII,S$GLB,, | Performed by: UROLOGY

## 2019-03-21 PROCEDURE — 3078F PR MOST RECENT DIASTOLIC BLOOD PRESSURE < 80 MM HG: ICD-10-PCS | Mod: CPTII,S$GLB,, | Performed by: UROLOGY

## 2019-03-21 PROCEDURE — 99999 PR PBB SHADOW E&M-EST. PATIENT-LVL III: ICD-10-PCS | Mod: PBBFAC,,, | Performed by: UROLOGY

## 2019-03-21 PROCEDURE — 51701 INSERT BLADDER CATHETER: CPT | Mod: S$GLB,,, | Performed by: UROLOGY

## 2019-03-21 PROCEDURE — 99215 PR OFFICE/OUTPT VISIT, EST, LEVL V, 40-54 MIN: ICD-10-PCS | Mod: 25,S$GLB,, | Performed by: UROLOGY

## 2019-03-21 PROCEDURE — 1101F PR PT FALLS ASSESS DOC 0-1 FALLS W/OUT INJ PAST YR: ICD-10-PCS | Mod: CPTII,S$GLB,, | Performed by: UROLOGY

## 2019-03-21 PROCEDURE — 51701 PR INSERTION OF NON-INDWELLING BLADDER CATHETERIZATION FOR RESIDUAL UR: ICD-10-PCS | Mod: S$GLB,,, | Performed by: UROLOGY

## 2019-03-21 NOTE — TELEPHONE ENCOUNTER
----- Message from Kirstie Rodriguez sent at 3/21/2019  4:17 PM CDT -----  Contact: self [phone]  Type:  RX Refill Request    Who Called: Antolin Richardson  Refill or New Rx:refill  RX Name and Strength: myrbetriq 50mg  How is the patient currently taking it? (ex. 1XDay):1x day  Is this a 30 day or 90 day RX: 90 day  Preferred Pharmacy with phone number:    [facility] [address] [address] [facility] [facility] [facility] [employer] AT [facility] [address]  [facility] [place] [address]  Phone: [phone] Fax: [phone]    Local or Mail Order:local  Ordering Provider: Trixie  Would the patient rather a call back or a response via [facility]? Call back  Best Call Back Number:[phone]  Additional Information:

## 2019-03-21 NOTE — PATIENT INSTRUCTIONS
Get a psa blood test in a week    Call us back with prescription- myrbertiq?    Catheterize every 2 weeks indefinitley    BPH with LuTS and recurrent prostatitis/uti s/p turp 8/1/18 with OAB, recurrent uti/Urethral stricture  -stress test negative with 200cc in bladder but does continue to have some SUN but  Not that bothersome. UUI and oab  Improved on oab med.   -continue myrbetriq (showed him pictures of toviaz, myrbetriq and ditropan and he says he is on myrbetriq even thought it wasn't covered??). Refill given today. He said he will call us to confirm this is the medication  -no repeat turp needed based on cysto last year  -ctu showed  -recurrent uti (possibly from stricture last time): ctu showed no stones. pvr today was 20cc. asymptomatic  -urine cytology (h/o smoking) 2/2018 negative, 6/2018 and 8/2018 showed atypical cells. F/u cysto 10/2018 showed no tumor. Smoker so will continue to follow (still smoking), sending cytology today.  -in and out cath today showed no urethral stricture with 16fr but still would like him to do it at least every 2 weeks (catheterize)    The natural history of prostate cancer and ongoing controversy regarding screening and potential treatment outcomes of prostate cancer has been discussed with the patient. The meaning of a false positive PSA and a false negative PSA has been discussed. He indicates understanding of the limitations of this screening test and wishes  to proceed with screening PSA testing.    rtc in 6 months       EXAMINATION:  CT UROGRAM ABD PELVIS W WO 12/2018    CLINICAL HISTORY:  recurrent uti, gross hematuria;Gross hematuria    TECHNIQUE:  Low dose axial, sagittal and coronal reformations were obtained from the lung bases to the pubic symphysis before and following the IV administration of 100 mL of Omnipaque 350.  Timing was optimized for nephrogram and excretory renal phases.    COMPARISON:  10/19/2018 and 08/19/2016    FINDINGS:  Centrilobular emphysema.  3  mm pulmonary nodule right middle lobe series 7, image 15, unchanged from comparison and favored benign given length of stability.  Normal sized heart.  Aorto bi-iliac stent which spans in infrarenal aneurysm fusiform type measuring 3.7 cm diameter.  Normal size gallbladder.    No calcified nephroureterolithiasis or hydroureteronephrosis.  There is a 12 mm lesion upper pole left kidney Hounsfield units precontrast 50 and postcontrast 53.  There is a 12 mm lesion along the inferior pole left kidney anteriorly Hounsfield units precontrast 14 and postcontrast 21.  There is also a subcentimeter hypodense lesion at the left renal midpole without definite enhancing component.  No abnormal renal enhancement.  No filling defects within the opacified segments of the renal collecting systems the ureters and the urinary bladder.    Remaining solid abdominal organs are unremarkable.  There is no enteric contrast which limits bowel assessment.  Wall thickening along the proximal stomach likely from inadequate distension.  No dilated bowel loops.  Fat containing left inguinal hernia.  Prostate is enlarged and heterogeneous with evidence of prior TURP.  Moderate degenerative change at the lumbosacral junction.  Mild degenerative disc disease at several lower thoracic levels.      Impression       1. No stones obstructive uropathy or other acute  abnormality.  2. Two hemorrhagic or proteinaceous cyst in the left kidney.  Additional left renal lesion too small to characterize.  3. Enlarged heterogeneous prostate.  Correlate with PSA.  4. Emphysema.  5. Abdominal aortic aneurysm with stent.

## 2019-03-21 NOTE — TELEPHONE ENCOUNTER
----- Message from Elham Adkins sent at 3/21/2019  9:37 AM CDT -----  Contact: pt  Pt called to inform that he will be 15 mins late for is 10am appt today  Please call pt to advise  Call back   Thanks

## 2019-03-21 NOTE — PROGRESS NOTES
[facility] Note - [place]  Staff: MD Trixie  PCP: Dr.Nadja Cheung    [facility]: active     Chief Complaint: gross hematuria and frequency    Subjective:        HPI: Antolin Richardson is a 68 y.o. male presents with     OAB, BPH, recurrent uti/prostatitis/meatal stricture/ Gross hematuria  -he initially came to see me 2/27/18 for frequency q20 to 30 minutes, weak stream and sometimes has sprayed stream and gross hematuria.. He had never been on any meds for his prostate and denied any hesistancy.  I started him on flomax 0.4mg nightly and did a cysto trus on 4/2/18 which showed a 99g prostate with a large circumferential median lobe.   -underwent meatotomy, cysto b rgp and turp on 8/1/18. Path negative. 36g resected. postop 8/9/18 pvr was 61cc complicated by meatal stricture.   -10/22/18 cystoscopy bc of atypical urine cells and found stricture 2cm in from meatus, no tumors seen.dilated to 24 fr and taught to dilate with catheter one daily, did this until he ran out of [employer] 2 weeks ago. flomax discontinued. Stress test negative 11/30/18 with 200 in bladder, bladder stable.  -ordered a ctu bc he has had 2 proteus uti's and it showed no stones or hydro.   -Changed him from ditropan to toviaz 8mg last visit and nowvoiding every 2-3 hours instead of every hour. No longer leaking continuously, only few small with stress. No pads. Good stream. Drinking 2 cups of coffee down from 5 cups. He's had no more gross hematuria. No burning with urination.     psa history  11/30/18 40g, no nodules  10/22/18 Meatal stricture dilated   8/1/18  Turp chips: 36g, b9 (no cancer)  4/2/18              trus volume: 99g  3/19/18            3.0, %free 29.67  2/27/18            3.8, % free 31.35, Marlon: no nodules, 40+g prostate     ua today void: sml leuk/tr bld- no sx of uti, send for cytology  ua cath: sent for ua and culture, pvr: 30cc. 16fr placed easily and no meatal stricture  ua history:    11/30/18 P.mirabilis, void: tr prot/tr bld  10/15/18 Proteus, Void:2+bld/1+leuk, 15 wbc/10 rbc  9/25/18 NG, Cath: 3+bld/1+leuk, 15 wbc/5 rbc, Cytology: urothelial atypia  7/25/18            Ng, cath: negative  7/9/18              Ng, void: 3+blood/tr protein  6/21/18            Coag neg staph, void and cath: nit+/3+blood/1+leuk, pvr I&0: 30cc, cytology: rare atypical cells, neg for malignancy.   4/2/18              No cx, void: 1+leuk and blood   3/31/18            Ng, void: negative  3/26/18            Coag neg staph, void: 2+ wbc, nit+, 50 blood - bactrim twice a day for 10 days  3/19/18            Ng, void: 1+ blood  2/27/18            Multiple org, void: 1+wbc, tr blood (bactrim x 7d), cytology: negative   ua today +, c/o burning     ED (did not discuss today)  -occ problems with erections.   -uses viagra  -IIEF: 13cc    REVIEW OF SYSTEMS:  General ROS: no fevers, no chills  Psychological ROS: no depression  Endocrine ROS: no heat or cold  Respiratory ROS: no SOB  Cardiovascular ROS: no CP  Gastrointestinal ROS: no abdominal pain, no constipation, no diarrhea, no BRBPR  Musculoskeletal ROS: no muscle pain  Neurological ROS: no headaches  Dermatological ROS: no rashes  HEENT: noglasses, no sinus   ROS: per HPI     PMHx:  Past Medical History:   Diagnosis Date    AAA (abdominal aortic aneurysm) 12/2016    s/p endo repair ([facility])    Acid reflux     Anxiety     Coronary artery disease     Coronary artery disease involving native coronary artery of native heart without angina pectoris 1/3/2017    Hypertension     Myocardial infarction      Kidney stones: No  Cataracts? none    PSHx:  Past Surgical History:   Procedure Laterality Date    ABDOMINAL AORTIC ANEURYSM REPAIR      APPENDECTOMY      COLONOSCOPY N/A 10/25/2018    Per[facility] at [facility] END[facility]    CORONARY ANGIOPLASTY  2009    coronary artery stent      x1    CYSTOSCOPY N/A 10/22/2018    [facility]  at [facility] OR    CYSTOSCOPY N/A 2018    [facility] at [facility] OR    CYSTOSCOPY  3/26/2018    [facility] at [facility] OR    REPAIR ABDOMINAL-AORTIC ANEURYSM-ENDOVASCULAR GRAFT (AAA) (C-ARM) N/A 2016    [facility]    stent for abdominal aneurysm      TRANSRECTAL ULTRASOUND N/A 2018    [facility] at [facility] OR    TRANSRECTAL ULTRASOUND  3/26/2018    [facility] by [facility] at [facility] OR    TRANSURETHRAL RESECTION OF PROSTATE (TURP) N/A 2018    Performed by [facility] at [facility] OR     Stents/Valves/Foreign Bodies: Yes - last placed in   Cardiac Evaluation: Yes -     Screening Studies  Colonoscopy:  found polyps    Fam Hx:   malignancies: No  . Gyn malignancies: no. Parents  and 80s and mother had brain cancer  kidney stones: No     Soc Hx:  +tobacco.  1.5 pk per day x 50 year  occ alcohol  Lives in [place]  :   Children: 3  Occupation:retired from security    Allergies:  Lisinopril    Urologic meds: flomax 0.4mg nightly, finasteride  Anticoagulation: Yes - asa 81mg daily     Objective:     Vitals:    19 1042   BP: (!) 145/74   Pulse: (!) 48         General:WDWN in NAD  Eyes: PERRLA, normal conjunctiva  Respiratory: No increased work on breathing.   Cardiovascular: No obvious extremity edema. Warm and well perfused.   GI: palpation of masses. No tenderness. No hepatosplenomegaly to palpation.  Musculoskeletal: normal range of motion of bilateral upper extremities. Normal muscle strength and tone.  Skin: no obvious rashes or lesions. No tightening of skin noted.  Neurologic: CN grossly normal. Normal sensation.   Psychiatric: awake, alert and oriented x 3. Mood and affect normal. Cooperative.    See above for pvr    LABS REVIEW:  BMP  Lab Results   Component Value Date     2018    K 4.1 2018    CL  108 09/28/2018    CO2 22 (L) 09/28/2018    BUN 13 09/28/2018    CREATININE 1.2 12/05/2018    CALCIUM 9.3 09/28/2018    ANIONGAP 11 09/28/2018    ESTGFRAFRICA >60 12/05/2018    EGFRNONAA >60 12/05/2018     Lab Results   Component Value Date    WBC 11.00 09/28/2018    HGB 16.2 09/28/2018    HCT 47.8 09/28/2018    MCV 87 09/28/2018     09/28/2018         PATHOLOGY REVIEW:  Urine cytology 9/25/18  -Urothelial atypia; few groups of atypical urothelial cells, benign and reactive urothelial cells, benign squamous cells  and mild to moderate acute inflammation (see comment).  COMMENT: The atypical urothelial cells seen in this specimen most likely represent reactive atypia. However, this  cannot be determined with absolute certainty. Clinical correlation, close clinical follow-up and additional testing is  Recommended.    PROSTATE, TRANSURETHRAL RESECTION (TURP) 8/1/18  -Benign prostatic tissue with nodular hyperplasia (36 gm)    Urine, catheterized 6/21/18  Negative for malignancy.  Rare atypical cells present.  Background of rbc's, bacteria and acute inflammatory cells.  Correlate clinically.    Urine 2/27/17: Negative for malignant cells. Will discuss at f/u    RADIOGRAPHIC REVIEW:  ctu 12/5/18  1. No stones obstructive uropathy or other acute  abnormality.  2. Two hemorrhagic or proteinaceous cyst in the left kidney.  Additional left renal lesion too small to characterize.  3. Enlarged heterogeneous prostate.  Correlate with PSA.  4. Emphysema.  5. Abdominal aortic aneurysm with stent.  Centrilobular emphysema.  3 mm pulmonary nodule right middle lobe series 7, image 15, unchanged from comparison and favored benign given length of stability.  Normal sized heart.  Aorto bi-iliac stent which spans in infrarenal aneurysm fusiform type measuring 3.7 cm diameter.  Normal size gallbladder.  No calcified nephroureterolithiasis or hydroureteronephrosis.  There is a 12 mm lesion upper pole left kidney Hounsfield units  precontrast 50 and postcontrast 53.  There is a 12 mm lesion along the inferior pole left kidney anteriorly Hounsfield units precontrast 14 and postcontrast 21.  There is also a subcentimeter hypodense lesion at the left renal midpole without definite enhancing component.  No abnormal renal enhancement.  No filling defects within the opacified segments of the renal collecting systems the ureters and the urinary bladder.  Remaining solid abdominal organs are unremarkable.  There is no enteric contrast which limits bowel assessment.  Wall thickening along the proximal stomach likely from inadequate distension.  No dilated bowel loops.  Fat containing left inguinal hernia.  Prostate is enlarged and heterogeneous with evidence of prior TURP.  Moderate degenerative change at the lumbosacral junction.  Mild degenerative disc disease at several lower thoracic levels.    cta 11/20/17 images reviewed by me  -no stones  -enlarged prostate    Interval placement of aortobiiliac endovascular stent graft which is patent with no extravasation and with partial collapse of the native surrounding aneurysm sac.    Additional findings as detailed above including enlarged prostate gland, diverticulosis without CT findings of acute diverticulitis, left renal masses suggesting low density and high density cysts.    Assessment:       1. OAB (overactive bladder)    2. Microscopic hematuria    3. Benign prostatic hyperplasia, unspecified whether lower urinary tract symptoms present    4. 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 Elevated PSA    6. Elevated prostate specific antigen (PSA)     7. Recurrent UTI          Plan:     BPH with LuTS and recurrent prostatitis/uti s/p turp 8/1/18 with OAB, recurrent uti/Urethral stricture  -stress test negative with 200cc in bladder but does continue to have some SUN but  Not that bothersome. UUI and oab  Improved on oab med.   -continue myrbetriq (showed him pictures of toviaz, myrbetriq and  ditropan and he says he is on myrbetriq even thought it wasn't covered??). Refill given today. He said he will call us to confirm this is the medication  -no repeat turp needed based on cysto last year  -ctu showed  -recurrent uti (possibly from stricture last time): ctu showed no stones. pvr today was 20cc. asymptomatic  -urine cytology (h/o smoking) 2/2018 negative, 6/2018 and 8/2018 showed atypical cells. F/u cysto 10/2018 showed no tumor. Smoker so will continue to follow (still smoking), sending cytology today.  -in and out cath today showed no urethral stricture with 16fr but still would like him to do it at least every 2 weeks (catheterize)    The natural history of prostate cancer and ongoing controversy regarding screening and potential treatment outcomes of prostate cancer has been discussed with the patient. The meaning of a false positive PSA and a false negative PSA has been discussed. He indicates understanding of the limitations of this screening test and wishes  to proceed with screening PSA testing.    rtc in 6 months         Poly Marcum MD

## 2019-03-23 LAB — BACTERIA UR CULT: NO GROWTH

## 2019-03-27 ENCOUNTER — LAB VISIT (OUTPATIENT)
Dept: LAB | Facility: HOSPITAL | Age: 69
End: 2019-03-27
Attending: UROLOGY
Payer: MEDICARE

## 2019-03-27 DIAGNOSIS — N32.81 OAB (OVERACTIVE BLADDER): ICD-10-CM

## 2019-03-27 DIAGNOSIS — R97.20 ELEVATED PROSTATE SPECIFIC ANTIGEN (PSA): ICD-10-CM

## 2019-03-27 LAB
PROSTATE SPECIFIC ANTIGEN, TOTAL: 0.79 NG/ML (ref 0–4)
PSA FREE MFR SERPL: 36.71 %
PSA FREE SERPL-MCNC: 0.29 NG/ML (ref 0.01–1.5)

## 2019-03-27 PROCEDURE — 36415 COLL VENOUS BLD VENIPUNCTURE: CPT

## 2019-03-27 PROCEDURE — 84154 ASSAY OF PSA FREE: CPT

## 2019-06-11 ENCOUNTER — TELEPHONE (OUTPATIENT)
Dept: SMOKING CESSATION | Facility: CLINIC | Age: 69
End: 2019-06-11

## 2019-07-09 ENCOUNTER — TELEPHONE (OUTPATIENT)
Dept: SMOKING CESSATION | Facility: CLINIC | Age: 69
End: 2019-07-09

## 2019-07-17 ENCOUNTER — TELEPHONE (OUTPATIENT)
Dept: SMOKING CESSATION | Facility: CLINIC | Age: 69
End: 2019-07-17

## 2019-10-30 RX ORDER — AMLODIPINE BESYLATE 10 MG/1
10 TABLET ORAL DAILY
Qty: 90 TABLET | Refills: 3 | Status: SHIPPED | OUTPATIENT
Start: 2019-10-30 | End: 2020-10-15 | Stop reason: SDUPTHER

## 2019-11-02 DIAGNOSIS — G62.9 NEUROPATHY: ICD-10-CM

## 2019-11-02 RX ORDER — OXYBUTYNIN CHLORIDE 10 MG/1
TABLET, EXTENDED RELEASE ORAL
Qty: 90 TABLET | Refills: 0 | OUTPATIENT
Start: 2019-11-02 | End: 2023-02-21

## 2019-11-04 RX ORDER — GABAPENTIN 300 MG/1
CAPSULE ORAL
Qty: 30 CAPSULE | Refills: 0 | OUTPATIENT
Start: 2019-11-04 | End: 2023-04-28

## 2019-11-21 ENCOUNTER — TELEPHONE (OUTPATIENT)
Dept: SMOKING CESSATION | Facility: CLINIC | Age: 69
End: 2019-11-21

## 2019-11-26 ENCOUNTER — TELEPHONE (OUTPATIENT)
Dept: SMOKING CESSATION | Facility: CLINIC | Age: 69
End: 2019-11-26

## 2019-12-13 RX ORDER — ATORVASTATIN CALCIUM 40 MG/1
TABLET, FILM COATED ORAL
Qty: 90 TABLET | Refills: 0 | Status: SHIPPED | OUTPATIENT
Start: 2019-12-13 | End: 2020-10-15 | Stop reason: SDUPTHER

## 2019-12-13 RX ORDER — METOPROLOL SUCCINATE 100 MG/1
TABLET, EXTENDED RELEASE ORAL
Qty: 90 TABLET | Refills: 0 | Status: SHIPPED | OUTPATIENT
Start: 2019-12-13 | End: 2020-10-15 | Stop reason: SDUPTHER

## 2020-02-14 DIAGNOSIS — I10 HYPERTENSION, ESSENTIAL: ICD-10-CM

## 2020-03-15 ENCOUNTER — HOSPITAL ENCOUNTER (EMERGENCY)
Facility: HOSPITAL | Age: 70
Discharge: HOME | End: 2020-03-15
Attending: EMERGENCY MEDICINE
Payer: MEDICARE

## 2020-03-15 VITALS
HEART RATE: 69 BPM | OXYGEN SATURATION: 98 % | WEIGHT: 175 LBS | DIASTOLIC BLOOD PRESSURE: 90 MMHG | RESPIRATION RATE: 17 BRPM | TEMPERATURE: 98 F | SYSTOLIC BLOOD PRESSURE: 170 MMHG | HEIGHT: 71 IN | BODY MASS INDEX: 24.5 KG/M2

## 2020-03-15 DIAGNOSIS — R51.9 SINUS HEADACHE: ICD-10-CM

## 2020-03-15 DIAGNOSIS — J30.2 SEASONAL ALLERGIES: Primary | ICD-10-CM

## 2020-03-15 PROCEDURE — 99283 EMERGENCY DEPT VISIT LOW MDM: CPT | Mod: ER

## 2020-03-15 RX ORDER — PREDNISONE 10 MG/1
10 TABLET ORAL DAILY
Qty: 5 TABLET | Refills: 0 | Status: SHIPPED | OUTPATIENT
Start: 2020-03-15 | End: 2020-03-20

## 2020-03-15 NOTE — ED PROVIDER NOTES
Encounter Date: 3/15/2020    SCRIBE #1 NOTE: I, Lina Samuel, am scribing for, and in the presence of,  Dr. Gustavo Dumont. I have scribed the following portions of the note - Other sections scribed: HPI, ROS, PE.       History     Chief Complaint   Patient presents with    Headache     Patient reports 2 days of chills and headache.    Mariah Richardson is a 69 y.o. male who presents to the ED complaining of acute HA x 2 days ago. Endorses chills, nasal congestion, and intermittent dry cough. Denies fever, sore throat, sinus pain/pressure, SOB, CP, myalgias and HA. Denies taking HTN medication this morning. Denies known (+) sick contact.     The history is provided by the patient. No  was used.     Review of patient's allergies indicates:   Allergen Reactions    Lisinopril Itching     Past Medical History:   Diagnosis Date    AAA (abdominal aortic aneurysm) 12/2016    s/p endo repair ([facility])    Acid reflux     Anxiety     Coronary artery disease     Coronary artery disease involving native coronary artery of native heart without angina pectoris 1/3/2017    Hypertension     Myocardial infarction      Past Surgical History:   Procedure Laterality Date    ABDOMINAL AORTIC ANEURYSM REPAIR      APPENDECTOMY      COLONOSCOPY N/A 10/25/2018    Procedure: COLONOSCOPY;  Surgeon: Jackie Linares MD;  Location: [facility] ENDO;  Service: Endoscopy;  Laterality: N/A;    CORONARY ANGIOPLASTY  2009    coronary artery stent      x1    CYSTOSCOPY N/A 10/22/2018    Procedure: CYSTOSCOPY;  Surgeon: Poly Marcum MD;  Location[facility] OR;  Service: Urology;  Laterality: N/A;    stent for abdominal aneurysm      TRANSURETHRAL RESECTION OF PROSTATE N/A 8/1/2018    Procedure: TRANSURETHRAL RESECTION OF PROSTATE (TURP);  Surgeon: Poly Marcum MD;  Location: [facility] OR;  Service: Urology;  Laterality: N/A;     Family History   Problem Relation Age of Onset    Cancer  Mother         brain    Dementia Father         alzheimer's    Hypertension Father      Social History     Tobacco Use    Smoking status: Current Every Day Smoker     Packs/day: 2.00     Years: 20.00     Pack years: 40.00    Smokeless tobacco: Never Used   Substance Use Topics    Alcohol use: Yes     Comment: occasionally    Drug use: No     Review of Systems   Constitutional: Positive for chills. Negative for fever.   HENT: Positive for congestion. Negative for sinus pressure, sinus pain and sore throat.    Eyes: Negative.  Negative for pain.   Respiratory: Positive for cough. Negative for shortness of breath.    Cardiovascular: Negative.  Negative for chest pain.   Gastrointestinal: Negative.  Negative for abdominal pain, nausea and vomiting.   Genitourinary: Negative.  Negative for dysuria.   Musculoskeletal: Negative.  Negative for back pain and myalgias.   Skin: Negative.  Negative for rash.   Neurological: Positive for headaches.   Hematological: Negative.    Psychiatric/Behavioral: Negative.    All other systems reviewed and are negative.      Physical Exam     Initial Vitals [03/15/20 0852]   BP Pulse Resp Temp SpO2   (!) 170/90 69 17 97.5 °F (36.4 °C) 98 %      MAP       --         Physical Exam    Nursing note and vitals reviewed.  Constitutional: He appears well-developed and well-nourished.   HENT:   Head: Normocephalic and atraumatic.   Right Ear: Tympanic membrane and external ear normal.   Left Ear: Tympanic membrane and external ear normal.   Mouth/Throat: Uvula is midline, oropharynx is clear and moist and mucous membranes are normal. No oropharyngeal exudate, posterior oropharyngeal edema or posterior oropharyngeal erythema.   +PND   Eyes: Conjunctivae and EOM are normal.   Neck: Normal range of motion and phonation normal. Neck supple.   Cardiovascular: Normal rate, regular rhythm, normal heart sounds and intact distal pulses. Exam reveals no gallop and no friction rub.    No murmur  heard.  Pulmonary/Chest: Effort normal and breath sounds normal. No respiratory distress. He has no wheezes. He has no rhonchi. He has no rales.   Abdominal: Soft. There is no tenderness.   Musculoskeletal: Normal range of motion.   Neurological: He is alert and oriented to person, place, and time.   Skin: Skin is warm and dry. No rash noted.   Psychiatric: He has a normal mood and affect. His behavior is normal.         ED Course   Procedures  Labs Reviewed - No data to display       Imaging Results    None          Medical Decision Making:   History:   Old Medical Records: I decided to obtain old medical records.            Scribe Attestation:   Scribe #1: I performed the above scribed service and the documentation accurately describes the services I performed. I attest to the accuracy of the note.               This document was produced by a scribe under my direction and in my presence. I agree with the content of the note and have made any necessary edits.     Gustavo Dumont MD    03/15/2020 3:26 PM           Clinical Impression:     1. Seasonal allergies    2. Sinus headache                ED Disposition Condition    Discharge Stable        ED Prescriptions     Medication Sig Dispense Start Date End Date Auth. Provider    predniSONE (DELTASONE) 10 MG tablet Take 1 tablet (10 mg total) by mouth once daily. for 5 days 5 tablet 3/15/2020 3/20/2020 Gustavo Dumont MD        Follow-up Information     Follow up With Specialties Details Why Contact Info    Sayda Cheung MD Internal Medicine, Wound Care Schedule an appointment as soon as possible for a visit in 1 week  [address] [place] [address]  [address]  [place] [address]  [phone]                                       Gustavo Dumont MD  03/15/20 9601

## 2020-03-30 RX ORDER — METOPROLOL SUCCINATE 100 MG/1
TABLET, EXTENDED RELEASE ORAL
Qty: 90 TABLET | Refills: 0 | OUTPATIENT
Start: 2020-03-30

## 2020-03-30 RX ORDER — ATORVASTATIN CALCIUM 40 MG/1
TABLET, FILM COATED ORAL
Qty: 90 TABLET | Refills: 0 | OUTPATIENT
Start: 2020-03-30

## 2020-05-12 ENCOUNTER — PATIENT MESSAGE (OUTPATIENT)
Dept: ADMINISTRATIVE | Facility: HOSPITAL | Age: 70
End: 2020-05-12

## 2020-10-01 ENCOUNTER — PATIENT MESSAGE (OUTPATIENT)
Dept: OTHER | Facility: OTHER | Age: 70
End: 2020-10-01

## 2020-10-05 ENCOUNTER — PATIENT MESSAGE (OUTPATIENT)
Dept: ADMINISTRATIVE | Facility: HOSPITAL | Age: 70
End: 2020-10-05

## 2020-10-15 ENCOUNTER — OFFICE VISIT (OUTPATIENT)
Dept: FAMILY MEDICINE | Facility: CLINIC | Age: 70
End: 2020-10-15
Payer: MEDICARE

## 2020-10-15 VITALS
OXYGEN SATURATION: 97 % | SYSTOLIC BLOOD PRESSURE: 138 MMHG | HEART RATE: 56 BPM | WEIGHT: 191.81 LBS | TEMPERATURE: 98 F | BODY MASS INDEX: 26.85 KG/M2 | DIASTOLIC BLOOD PRESSURE: 82 MMHG | HEIGHT: 71 IN

## 2020-10-15 DIAGNOSIS — R79.9 ABNORMAL FINDING OF BLOOD CHEMISTRY, UNSPECIFIED: ICD-10-CM

## 2020-10-15 DIAGNOSIS — Z87.891 PERSONAL HISTORY OF NICOTINE DEPENDENCE: ICD-10-CM

## 2020-10-15 DIAGNOSIS — I10 HYPERTENSION, ESSENTIAL: Primary | ICD-10-CM

## 2020-10-15 DIAGNOSIS — Z12.2 ENCOUNTER FOR SCREENING FOR MALIGNANT NEOPLASM OF RESPIRATORY ORGANS: ICD-10-CM

## 2020-10-15 DIAGNOSIS — E78.1 PURE HYPERGLYCERIDEMIA: ICD-10-CM

## 2020-10-15 PROCEDURE — 3075F SYST BP GE 130 - 139MM HG: CPT | Mod: CPTII,S$GLB,, | Performed by: INTERNAL MEDICINE

## 2020-10-15 PROCEDURE — 3008F PR BODY MASS INDEX (BMI) DOCUMENTED: ICD-10-PCS | Mod: CPTII,S$GLB,, | Performed by: INTERNAL MEDICINE

## 2020-10-15 PROCEDURE — 1101F PT FALLS ASSESS-DOCD LE1/YR: CPT | Mod: CPTII,S$GLB,, | Performed by: INTERNAL MEDICINE

## 2020-10-15 PROCEDURE — 1101F PR PT FALLS ASSESS DOC 0-1 FALLS W/OUT INJ PAST YR: ICD-10-PCS | Mod: CPTII,S$GLB,, | Performed by: INTERNAL MEDICINE

## 2020-10-15 PROCEDURE — 99999 PR PBB SHADOW E&M-EST. PATIENT-LVL III: ICD-10-PCS | Mod: PBBFAC,,, | Performed by: INTERNAL MEDICINE

## 2020-10-15 PROCEDURE — 1159F MED LIST DOCD IN RCRD: CPT | Mod: S$GLB,,, | Performed by: INTERNAL MEDICINE

## 2020-10-15 PROCEDURE — 99214 PR OFFICE/OUTPT VISIT, EST, LEVL IV, 30-39 MIN: ICD-10-PCS | Mod: S$GLB,,, | Performed by: INTERNAL MEDICINE

## 2020-10-15 PROCEDURE — 3008F BODY MASS INDEX DOCD: CPT | Mod: CPTII,S$GLB,, | Performed by: INTERNAL MEDICINE

## 2020-10-15 PROCEDURE — 1126F AMNT PAIN NOTED NONE PRSNT: CPT | Mod: S$GLB,,, | Performed by: INTERNAL MEDICINE

## 2020-10-15 PROCEDURE — 3079F PR MOST RECENT DIASTOLIC BLOOD PRESSURE 80-89 MM HG: ICD-10-PCS | Mod: CPTII,S$GLB,, | Performed by: INTERNAL MEDICINE

## 2020-10-15 PROCEDURE — 3075F PR MOST RECENT SYSTOLIC BLOOD PRESS GE 130-139MM HG: ICD-10-PCS | Mod: CPTII,S$GLB,, | Performed by: INTERNAL MEDICINE

## 2020-10-15 PROCEDURE — 3079F DIAST BP 80-89 MM HG: CPT | Mod: CPTII,S$GLB,, | Performed by: INTERNAL MEDICINE

## 2020-10-15 PROCEDURE — 1126F PR PAIN SEVERITY QUANTIFIED, NO PAIN PRESENT: ICD-10-PCS | Mod: S$GLB,,, | Performed by: INTERNAL MEDICINE

## 2020-10-15 PROCEDURE — 99999 PR PBB SHADOW E&M-EST. PATIENT-LVL III: CPT | Mod: PBBFAC,,, | Performed by: INTERNAL MEDICINE

## 2020-10-15 PROCEDURE — 99214 OFFICE O/P EST MOD 30 MIN: CPT | Mod: S$GLB,,, | Performed by: INTERNAL MEDICINE

## 2020-10-15 PROCEDURE — 1159F PR MEDICATION LIST DOCUMENTED IN MEDICAL RECORD: ICD-10-PCS | Mod: S$GLB,,, | Performed by: INTERNAL MEDICINE

## 2020-10-15 RX ORDER — AMLODIPINE BESYLATE 10 MG/1
10 TABLET ORAL DAILY
Qty: 90 TABLET | Refills: 3 | Status: SHIPPED | OUTPATIENT
Start: 2020-10-15 | End: 2020-11-17 | Stop reason: SDUPTHER

## 2020-10-15 RX ORDER — ATORVASTATIN CALCIUM 40 MG/1
40 TABLET, FILM COATED ORAL NIGHTLY
Qty: 90 TABLET | Refills: 3 | Status: SHIPPED | OUTPATIENT
Start: 2020-10-15 | End: 2023-04-04 | Stop reason: SDUPTHER

## 2020-10-15 RX ORDER — METOPROLOL SUCCINATE 100 MG/1
100 TABLET, EXTENDED RELEASE ORAL DAILY
Qty: 90 TABLET | Refills: 3 | OUTPATIENT
Start: 2020-10-15 | End: 2023-02-21

## 2020-10-15 RX ORDER — FENOFIBRATE 160 MG/1
160 TABLET ORAL DAILY
Qty: 90 TABLET | Refills: 3 | Status: SHIPPED | OUTPATIENT
Start: 2020-10-15 | End: 2020-11-17

## 2020-10-15 NOTE — PROGRESS NOTES
SUBJECTIVE     Chief Complaint   Patient presents with    Follow Up/ Renew Medications       HPI  Antolin Richardson is a 69 y.o. male with multiple medical diagnoses as listed in the medical history and problem list that presents for follow-up for med refills for HTN and HLD. Pt has been doing well since his last visit. He is fully compliant with meds and denies any adverse side effects. Pt tries to adhere to a low Na and low cholesterol diet. He does a lot of walking for exercise. Pt checks his BP at home every other day with readings of 130s/80s. Pt is without any other complaints today.     PAST MEDICAL HISTORY:  Past Medical History:   Diagnosis Date    AAA (abdominal aortic aneurysm) 12/2016    s/p endo repair ([facility])    Acid reflux     Anxiety     Coronary artery disease     Coronary artery disease involving native coronary artery of native heart without angina pectoris 1/3/2017    Hypertension     Myocardial infarction        PAST SURGICAL HISTORY:  Past Surgical History:   Procedure Laterality Date    ABDOMINAL AORTIC ANEURYSM REPAIR      APPENDECTOMY      COLONOSCOPY N/A 10/25/2018    Procedure: COLONOSCOPY;  Surgeon: Jackie Linares MD;  [facility]: [facility] ENDO;  Service: Endoscopy;  Laterality: N/A;    CORONARY ANGIOPLASTY  2009    coronary artery stent      x1    CYSTOSCOPY N/A 10/22/2018    Procedure: CYSTOSCOPY;  Surgeon: Poly Marcum MD;  Location: [facility] OR;  Service: Urology;  Laterality: N/A;    stent for abdominal aneurysm      TRANSURETHRAL RESECTION OF PROSTATE N/A 8/1/2018    Procedure: TRANSURETHRAL RESECTION OF PROSTATE (TURP);  Surgeon: Poly Marcum MD;  Location: [facility] OR;  Service: Urology;  Laterality: N/A;       SOCIAL HISTORY:  Social History     Socioeconomic History    Marital status:      Spouse name: Not on file    Number of children: Not on file    Years of education: Not on file    Highest education level: Not on file   Occupational  History    Not on file   Social Needs    Financial resource strain: Not on file    Food insecurity     Worry: Not on file     Inability: Not on file    Transportation needs     Medical: Not on file     Non-medical: Not on file   Tobacco Use    Smoking status: Current Every Day Smoker     Packs/day: 2.00     Years: 20.00     Pack years: 40.00    Smokeless tobacco: Never Used   Substance and Sexual Activity    Alcohol use: Yes     Comment: occasionally    Drug use: No    Sexual activity: Yes     Partners: Female   Lifestyle    Physical activity     Days per week: Not on file     Minutes per session: Not on file    Stress: Not on file   Relationships    Social connections     Talks on phone: Not on file     Gets together: Not on file     Attends [employer] service: Not on file     Active member of club or organization: Not on file     Attends meetings of clubs or organizations: Not on file     Relationship status: Not on file   Other Topics Concern    Not on file   Social History Narrative    Not on file       FAMILY HISTORY:  Family History   Problem Relation Age of Onset    Cancer Mother         brain    Dementia Father         alzheimer's    Hypertension Father        ALLERGIES AND MEDICATIONS: updated and reviewed.  Review of patient's allergies indicates:   Allergen Reactions    Lisinopril Itching     Current Outpatient Medications   Medication Sig Dispense Refill    amLODIPine (NORVASC) 10 MG tablet Take 1 tablet (10 mg total) by mouth once daily. 90 tablet 3    aspirin (ECOTRIN) 81 MG EC tablet Take 1 tablet (81 mg total) by mouth once daily. 90 tablet 3    atorvastatin (LIPITOR) 40 MG tablet Take 1 tablet (40 mg total) by mouth every evening. 90 tablet 3    gabapentin (NEURONTIN) 300 MG capsule TAKE 1 CAPSULE(300 MG) BY MOUTH EVERY EVENING 30 capsule 0    metoprolol succinate (TOPROL-XL) 100 MG 24 hr tablet Take 1 tablet (100 mg total) by mouth once daily. 90 tablet 3    oxybutynin  "(DITROPAN-XL) 10 MG 24 hr tablet TAKE 1 TABLET BY MOUTH ONCE DAILY 90 tablet 0    polyethylene glycol (GLYCOLAX) 17 gram/dose powder 1/2 to 1 cap daily to prevent constipation on pain meds 255 g 0    fenofibrate 160 MG Tab Take 1 tablet (160 mg total) by mouth once daily. 90 tablet 3    mirabegron (MYRBETRIQ) 50 mg Tb24 Take 1 tablet (50 mg total) by mouth once daily. 90 tablet 3     No current facility-administered medications for this visit.        ROS  Review of Systems   Constitutional: Negative for chills and fever.   HENT: Negative for hearing loss and sore throat.    Eyes: Negative for visual disturbance.   Respiratory: Negative for cough and shortness of breath.    Cardiovascular: Negative for chest pain, palpitations and leg swelling.   Gastrointestinal: Negative for abdominal pain, constipation, diarrhea, nausea and vomiting.   Genitourinary: Negative for dysuria, frequency and urgency.   Musculoskeletal: Negative for arthralgias, joint swelling and myalgias.   Skin: Negative for rash and wound.   Neurological: Negative for headaches.   Psychiatric/Behavioral: Negative for agitation and confusion. The patient is not nervous/anxious.          OBJECTIVE     Physical Exam  Vitals:    10/15/20 1617   BP: 138/82   Pulse:    Temp:     Body mass index is 26.75 kg/m².  Weight: 87 kg (191 lb 12.8 oz)   Height: 5' 11" (180.3 cm)     Physical Exam  Constitutional:       General: He is not in acute distress.     Appearance: He is well-developed.   HENT:      Head: Normocephalic and atraumatic.      Right Ear: External ear normal.      Left Ear: External ear normal.      Nose: Nose normal.   Eyes:      General: No scleral icterus.        Right eye: No discharge.         Left eye: No discharge.      Conjunctiva/sclera: Conjunctivae normal.   Neck:      Musculoskeletal: Normal range of motion and neck supple.      Vascular: No JVD.      Trachea: No tracheal deviation.   Cardiovascular:      Rate and Rhythm: Normal " rate and regular rhythm.      Heart sounds: Normal heart sounds. No murmur. No friction rub. No gallop.    Pulmonary:      Effort: Pulmonary effort is normal. No respiratory distress.      Breath sounds: Normal breath sounds. No wheezing.   Abdominal:      General: Bowel sounds are normal. There is no distension.      Palpations: Abdomen is soft. There is no mass.      Tenderness: There is no abdominal tenderness. There is no guarding or rebound.   Musculoskeletal: Normal range of motion.         General: No tenderness or deformity.   Skin:     General: Skin is warm and dry.      Findings: No erythema or rash.   Neurological:      Mental Status: He is alert and oriented to person, place, and time.      Motor: No abnormal muscle tone.      Coordination: Coordination normal.   Psychiatric:         Behavior: Behavior normal.         Thought Content: Thought content normal.         Judgment: Judgment normal.           Health Maintenance       Date Due Completion Date    TETANUS VACCINE 11/27/1970 ---    Lipid Panel 02/21/2020 2/21/2019    LDCT Lung Screen 02/27/2020 2/27/2019    Shingles Vaccine (3 of 3) 11/04/2020 9/9/2020    Aspirin/Antiplatelet Therapy 10/15/2021 10/15/2020    Colorectal Cancer Screening 10/25/2021 10/25/2018    Pneumococcal Vaccine (65+ Low/Medium Risk) (2 of 2 - PPSV23) 11/17/2022 11/17/2017            ASSESSMENT     69 y.o. male with     1. Hypertension, essential    2. Pure hyperglyceridemia    3. Abnormal finding of blood chemistry, unspecified     4. Encounter for screening for malignant neoplasm of respiratory organs    5. Personal history of nicotine dependence         PLAN:     1. Hypertension, essential  - BP well controlled; at goal of <140/90  - The current medical regimen is effective;  continue present plan and medications.  - amLODIPine (NORVASC) 10 MG tablet; Take 1 tablet (10 mg total) by mouth once daily.  Dispense: 90 tablet; Refill: 3  - metoprolol succinate (TOPROL-XL) 100 MG 24  hr tablet; Take 1 tablet (100 mg total) by mouth once daily.  Dispense: 90 tablet; Refill: 3  - CBC auto differential; Future  - Comprehensive Metabolic Panel; Future  - Hemoglobin A1C; Future  - TSH; Future    2. Pure hyperglyceridemia  - Check labs  - atorvastatin (LIPITOR) 40 MG tablet; Take 1 tablet (40 mg total) by mouth every evening.  Dispense: 90 tablet; Refill: 3  - fenofibrate 160 MG Tab; Take 1 tablet (160 mg total) by mouth once daily.  Dispense: 90 tablet; Refill: 3  - Lipid Panel; Future    3. Abnormal finding of blood chemistry, unspecified   - Hemoglobin A1C; Future    4. Encounter for screening for malignant neoplasm of respiratory organs  - CT Chest Lung Screening Low Dose; Future    5. Personal history of nicotine dependence   - CT Chest Lung Screening Low Dose; Future        RTC in 6 months     Sayda Cheung MD  10/15/2020 4:17 PM        No follow-ups on file.

## 2020-10-16 ENCOUNTER — LAB VISIT (OUTPATIENT)
Dept: LAB | Facility: HOSPITAL | Age: 70
End: 2020-10-16
Attending: INTERNAL MEDICINE
Payer: MEDICARE

## 2020-10-16 DIAGNOSIS — I10 HYPERTENSION, ESSENTIAL: ICD-10-CM

## 2020-10-16 DIAGNOSIS — E78.1 PURE HYPERGLYCERIDEMIA: ICD-10-CM

## 2020-10-16 DIAGNOSIS — N17.9 AKI (ACUTE KIDNEY INJURY): Primary | ICD-10-CM

## 2020-10-16 DIAGNOSIS — R79.9 ABNORMAL FINDING OF BLOOD CHEMISTRY, UNSPECIFIED: ICD-10-CM

## 2020-10-16 LAB
ALBUMIN SERPL BCP-MCNC: 4 G/DL (ref 3.5–5.2)
ALP SERPL-CCNC: 62 U/L (ref 55–135)
ALT SERPL W/O P-5'-P-CCNC: 29 U/L (ref 10–44)
ANION GAP SERPL CALC-SCNC: 7 MMOL/L (ref 8–16)
AST SERPL-CCNC: 22 U/L (ref 10–40)
BASOPHILS # BLD AUTO: 0.08 K/UL (ref 0–0.2)
BASOPHILS NFR BLD: 1 % (ref 0–1.9)
BILIRUB SERPL-MCNC: 0.4 MG/DL (ref 0.1–1)
BUN SERPL-MCNC: 17 MG/DL (ref 8–23)
CALCIUM SERPL-MCNC: 9.2 MG/DL (ref 8.7–10.5)
CHLORIDE SERPL-SCNC: 107 MMOL/L (ref 95–110)
CHOLEST SERPL-MCNC: 193 MG/DL (ref 120–199)
CHOLEST/HDLC SERPL: 6 {RATIO} (ref 2–5)
CO2 SERPL-SCNC: 25 MMOL/L (ref 23–29)
CREAT SERPL-MCNC: 1.3 MG/DL (ref 0.5–1.4)
DIFFERENTIAL METHOD: ABNORMAL
EOSINOPHIL # BLD AUTO: 0.2 K/UL (ref 0–0.5)
EOSINOPHIL NFR BLD: 2.7 % (ref 0–8)
ERYTHROCYTE [DISTWIDTH] IN BLOOD BY AUTOMATED COUNT: 13.7 % (ref 11.5–14.5)
EST. GFR  (AFRICAN AMERICAN): >60 ML/MIN/1.73 M^2
EST. GFR  (NON AFRICAN AMERICAN): 56 ML/MIN/1.73 M^2
ESTIMATED AVG GLUCOSE: 114 MG/DL (ref 68–131)
GLUCOSE SERPL-MCNC: 99 MG/DL (ref 70–110)
HBA1C MFR BLD HPLC: 5.6 % (ref 4–5.6)
HCT VFR BLD AUTO: 47.5 % (ref 40–54)
HDLC SERPL-MCNC: 32 MG/DL (ref 40–75)
HDLC SERPL: 16.6 % (ref 20–50)
HGB BLD-MCNC: 15.8 G/DL (ref 14–18)
IMM GRANULOCYTES # BLD AUTO: 0.05 K/UL (ref 0–0.04)
IMM GRANULOCYTES NFR BLD AUTO: 0.6 % (ref 0–0.5)
LDLC SERPL CALC-MCNC: 110.2 MG/DL (ref 63–159)
LYMPHOCYTES # BLD AUTO: 2.7 K/UL (ref 1–4.8)
LYMPHOCYTES NFR BLD: 34.7 % (ref 18–48)
MCH RBC QN AUTO: 29.5 PG (ref 27–31)
MCHC RBC AUTO-ENTMCNC: 33.3 G/DL (ref 32–36)
MCV RBC AUTO: 89 FL (ref 82–98)
MONOCYTES # BLD AUTO: 0.9 K/UL (ref 0.3–1)
MONOCYTES NFR BLD: 11 % (ref 4–15)
NEUTROPHILS # BLD AUTO: 4 K/UL (ref 1.8–7.7)
NEUTROPHILS NFR BLD: 50 % (ref 38–73)
NONHDLC SERPL-MCNC: 161 MG/DL
NRBC BLD-RTO: 0 /100 WBC
PLATELET # BLD AUTO: 232 K/UL (ref 150–350)
PMV BLD AUTO: 9.9 FL (ref 9.2–12.9)
POTASSIUM SERPL-SCNC: 4.5 MMOL/L (ref 3.5–5.1)
PROT SERPL-MCNC: 7.7 G/DL (ref 6–8.4)
RBC # BLD AUTO: 5.36 M/UL (ref 4.6–6.2)
SODIUM SERPL-SCNC: 139 MMOL/L (ref 136–145)
TRIGL SERPL-MCNC: 254 MG/DL (ref 30–150)
TSH SERPL DL<=0.005 MIU/L-ACNC: 0.45 UIU/ML (ref 0.4–4)
WBC # BLD AUTO: 7.9 K/UL (ref 3.9–12.7)

## 2020-10-16 PROCEDURE — 36415 COLL VENOUS BLD VENIPUNCTURE: CPT | Mod: PO

## 2020-10-16 PROCEDURE — 83036 HEMOGLOBIN GLYCOSYLATED A1C: CPT

## 2020-10-16 PROCEDURE — 80053 COMPREHEN METABOLIC PANEL: CPT

## 2020-10-16 PROCEDURE — 80061 LIPID PANEL: CPT

## 2020-10-16 PROCEDURE — 84443 ASSAY THYROID STIM HORMONE: CPT

## 2020-10-16 PROCEDURE — 85025 COMPLETE CBC W/AUTO DIFF WBC: CPT

## 2020-10-21 ENCOUNTER — HOSPITAL ENCOUNTER (OUTPATIENT)
Dept: RADIOLOGY | Facility: HOSPITAL | Age: 70
Discharge: HOME | End: 2020-10-21
Attending: INTERNAL MEDICINE
Payer: MEDICARE

## 2020-10-21 DIAGNOSIS — Z87.891 PERSONAL HISTORY OF NICOTINE DEPENDENCE: ICD-10-CM

## 2020-10-21 DIAGNOSIS — Z12.2 ENCOUNTER FOR SCREENING FOR MALIGNANT NEOPLASM OF RESPIRATORY ORGANS: ICD-10-CM

## 2020-10-21 PROBLEM — J43.8 OTHER EMPHYSEMA: Status: ACTIVE | Noted: 2020-10-21

## 2020-10-21 PROCEDURE — G0297 LDCT FOR LUNG CA SCREEN: HCPCS | Mod: TC

## 2020-10-21 PROCEDURE — G0297 CT CHEST LUNG SCREENING LOW DOSE: ICD-10-PCS | Mod: 26,,, | Performed by: RADIOLOGY

## 2020-10-21 PROCEDURE — G0297 LDCT FOR LUNG CA SCREEN: HCPCS | Mod: 26,,, | Performed by: RADIOLOGY

## 2020-11-17 DIAGNOSIS — I10 HYPERTENSION, ESSENTIAL: ICD-10-CM

## 2020-11-17 RX ORDER — AMLODIPINE BESYLATE 10 MG/1
10 TABLET ORAL DAILY
Qty: 90 TABLET | Refills: 2 | Status: SHIPPED | OUTPATIENT
Start: 2020-11-17 | End: 2021-07-14 | Stop reason: SDUPTHER

## 2020-11-17 NOTE — TELEPHONE ENCOUNTER
----- Message from Liliane Hernandez sent at 11/16/2020  1:56 PM CST -----  Type: Patient Call Back    Who called: Self     What is the request in detail: patient says he need to speak with the doctor about his medications. He would like to know why she has him on 2 different cholesterol medications. Please call     Can the clinic reply by [facility]? No     Would the patient rather a call back or a response via [facility]?  Call     Best call back number:.[phone]

## 2020-11-17 NOTE — ADDENDUM NOTE
Addended by: LAZARA CORDERO on: 11/17/2020 03:57 PM     Modules accepted: Orders     likely dilutional given IVF and AM lab error, no s/s of bleeding  will repeat cbc now  if downtrending will continue to monitor until to tomorrow

## 2020-11-17 NOTE — TELEPHONE ENCOUNTER
Please inform pt that he's on 2 different meds because his triglycerides were high. If he can commit to a low cholesterol diet, he can just drop down to taking Lipitor alone.     CORNELIUSI: I discontinued Fenofibrate during this encounter.

## 2020-12-11 ENCOUNTER — PATIENT MESSAGE (OUTPATIENT)
Dept: OTHER | Facility: OTHER | Age: 70
End: 2020-12-11

## 2021-05-12 ENCOUNTER — PATIENT MESSAGE (OUTPATIENT)
Dept: RESEARCH | Facility: HOSPITAL | Age: 71
End: 2021-05-12

## 2021-07-14 DIAGNOSIS — I10 HYPERTENSION, ESSENTIAL: ICD-10-CM

## 2021-07-14 RX ORDER — AMLODIPINE BESYLATE 10 MG/1
10 TABLET ORAL DAILY
Qty: 90 TABLET | Refills: 0 | Status: SHIPPED | OUTPATIENT
Start: 2021-07-14 | End: 2023-02-21 | Stop reason: SDUPTHER

## 2021-10-13 ENCOUNTER — HOSPITAL ENCOUNTER (EMERGENCY)
Facility: HOSPITAL | Age: 71
Discharge: HOME | End: 2021-10-13
Attending: EMERGENCY MEDICINE
Payer: MEDICARE

## 2021-10-13 VITALS
WEIGHT: 184 LBS | HEIGHT: 71 IN | OXYGEN SATURATION: 97 % | BODY MASS INDEX: 25.76 KG/M2 | RESPIRATION RATE: 18 BRPM | HEART RATE: 66 BPM | SYSTOLIC BLOOD PRESSURE: 158 MMHG | DIASTOLIC BLOOD PRESSURE: 78 MMHG | TEMPERATURE: 98 F

## 2021-10-13 DIAGNOSIS — R07.89 ATYPICAL CHEST PAIN: Primary | ICD-10-CM

## 2021-10-13 DIAGNOSIS — R07.9 CHEST PAIN: ICD-10-CM

## 2021-10-13 LAB
ALBUMIN SERPL BCP-MCNC: 3.9 G/DL (ref 3.5–5.2)
ALP SERPL-CCNC: 76 U/L (ref 55–135)
ALT SERPL W/O P-5'-P-CCNC: 35 U/L (ref 10–44)
ANION GAP SERPL CALC-SCNC: 7 MMOL/L (ref 8–16)
AST SERPL-CCNC: 19 U/L (ref 10–40)
BASOPHILS # BLD AUTO: 0.08 K/UL (ref 0–0.2)
BASOPHILS NFR BLD: 0.9 % (ref 0–1.9)
BILIRUB SERPL-MCNC: 0.5 MG/DL (ref 0.1–1)
BUN SERPL-MCNC: 13 MG/DL (ref 8–23)
CALCIUM SERPL-MCNC: 8.8 MG/DL (ref 8.7–10.5)
CHLORIDE SERPL-SCNC: 109 MMOL/L (ref 95–110)
CO2 SERPL-SCNC: 24 MMOL/L (ref 23–29)
CREAT SERPL-MCNC: 1 MG/DL (ref 0.5–1.4)
DIFFERENTIAL METHOD: ABNORMAL
EOSINOPHIL # BLD AUTO: 0.1 K/UL (ref 0–0.5)
EOSINOPHIL NFR BLD: 0.9 % (ref 0–8)
ERYTHROCYTE [DISTWIDTH] IN BLOOD BY AUTOMATED COUNT: 13.2 % (ref 11.5–14.5)
EST. GFR  (AFRICAN AMERICAN): >60 ML/MIN/1.73 M^2
EST. GFR  (NON AFRICAN AMERICAN): >60 ML/MIN/1.73 M^2
GLUCOSE SERPL-MCNC: 102 MG/DL (ref 70–110)
HCT VFR BLD AUTO: 47 % (ref 40–54)
HGB BLD-MCNC: 16.5 G/DL (ref 14–18)
IMM GRANULOCYTES # BLD AUTO: 0.04 K/UL (ref 0–0.04)
IMM GRANULOCYTES NFR BLD AUTO: 0.4 % (ref 0–0.5)
LYMPHOCYTES # BLD AUTO: 1.3 K/UL (ref 1–4.8)
LYMPHOCYTES NFR BLD: 13.8 % (ref 18–48)
MCH RBC QN AUTO: 29.6 PG (ref 27–31)
MCHC RBC AUTO-ENTMCNC: 35.1 G/DL (ref 32–36)
MCV RBC AUTO: 84 FL (ref 82–98)
MONOCYTES # BLD AUTO: 0.9 K/UL (ref 0.3–1)
MONOCYTES NFR BLD: 9.5 % (ref 4–15)
MYOGLOBIN: 56 NG/ML (ref 14–106)
NEUTROPHILS # BLD AUTO: 6.8 K/UL (ref 1.8–7.7)
NEUTROPHILS NFR BLD: 74.5 % (ref 38–73)
NRBC BLD-RTO: 0 /100 WBC
PLATELET # BLD AUTO: 246 K/UL (ref 150–450)
PMV BLD AUTO: 9.3 FL (ref 9.2–12.9)
POTASSIUM SERPL-SCNC: 3.4 MMOL/L (ref 3.5–5.1)
PROT SERPL-MCNC: 8.6 G/DL (ref 6–8.4)
RBC # BLD AUTO: 5.57 M/UL (ref 4.6–6.2)
SODIUM SERPL-SCNC: 140 MMOL/L (ref 136–145)
TROPONIN I SERPL DL<=0.01 NG/ML-MCNC: <0.02 NG/ML (ref 0–0.03)
WBC # BLD AUTO: 9.09 K/UL (ref 3.9–12.7)

## 2021-10-13 PROCEDURE — 99285 EMERGENCY DEPT VISIT HI MDM: CPT | Mod: 25

## 2021-10-13 PROCEDURE — 84484 ASSAY OF TROPONIN QUANT: CPT | Performed by: EMERGENCY MEDICINE

## 2021-10-13 PROCEDURE — 63600175 PHARM REV CODE 636 W HCPCS: Performed by: EMERGENCY MEDICINE

## 2021-10-13 PROCEDURE — 85025 COMPLETE CBC W/AUTO DIFF WBC: CPT | Performed by: EMERGENCY MEDICINE

## 2021-10-13 PROCEDURE — 93005 ELECTROCARDIOGRAM TRACING: CPT

## 2021-10-13 PROCEDURE — 93010 ELECTROCARDIOGRAM REPORT: CPT | Mod: ,,, | Performed by: INTERNAL MEDICINE

## 2021-10-13 PROCEDURE — 96374 THER/PROPH/DIAG INJ IV PUSH: CPT

## 2021-10-13 PROCEDURE — 93010 EKG 12-LEAD: ICD-10-PCS | Mod: ,,, | Performed by: INTERNAL MEDICINE

## 2021-10-13 PROCEDURE — 36415 COLL VENOUS BLD VENIPUNCTURE: CPT | Performed by: EMERGENCY MEDICINE

## 2021-10-13 PROCEDURE — 80053 COMPREHEN METABOLIC PANEL: CPT | Performed by: EMERGENCY MEDICINE

## 2021-10-13 PROCEDURE — 83874 ASSAY OF MYOGLOBIN: CPT | Performed by: EMERGENCY MEDICINE

## 2021-10-13 RX ORDER — LORAZEPAM 2 MG/ML
0.5 INJECTION INTRAMUSCULAR
Status: COMPLETED | OUTPATIENT
Start: 2021-10-13 | End: 2021-10-13

## 2021-10-13 RX ADMIN — LORAZEPAM 0.5 MG: 2 INJECTION INTRAMUSCULAR; INTRAVENOUS at 03:10

## 2022-05-30 ENCOUNTER — PATIENT MESSAGE (OUTPATIENT)
Dept: ADMINISTRATIVE | Facility: HOSPITAL | Age: 72
End: 2022-05-30
Payer: MEDICARE

## 2022-10-28 ENCOUNTER — TELEPHONE (OUTPATIENT)
Dept: FAMILY MEDICINE | Facility: CLINIC | Age: 72
End: 2022-10-28
Payer: MEDICARE

## 2022-10-28 NOTE — TELEPHONE ENCOUNTER
----- Message from Carrie Dillard sent at 10/28/2022  9:10 AM CDT -----  Type: Patient Call Back    Who called: self     What is the request in detail: Patient just moved to [place] and his new apartment complex is requesting a letter stating he needs a service dog. Can be faxed to them at, fax: [phone]    Can the clinic reply by [facility]? No     Would the patient rather a call back or a response via [facility]? Call back     Best call back number: [phone]

## 2022-10-28 NOTE — TELEPHONE ENCOUNTER
Informed pt Dr. Cheung would not be able to generate that letter as she had not seen pt inm ore than 2 year.  He states understanding.

## 2023-02-21 ENCOUNTER — HOSPITAL ENCOUNTER (OUTPATIENT)
Facility: HOSPITAL | Age: 73
Discharge: HOME | End: 2023-02-21
Attending: EMERGENCY MEDICINE | Admitting: INTERNAL MEDICINE
Payer: MEDICARE

## 2023-02-21 VITALS
OXYGEN SATURATION: 96 % | HEIGHT: 70 IN | SYSTOLIC BLOOD PRESSURE: 142 MMHG | BODY MASS INDEX: 23.77 KG/M2 | DIASTOLIC BLOOD PRESSURE: 69 MMHG | TEMPERATURE: 99 F | HEART RATE: 60 BPM | RESPIRATION RATE: 23 BRPM | WEIGHT: 166 LBS

## 2023-02-21 DIAGNOSIS — R07.9 CHEST PAIN AT REST: ICD-10-CM

## 2023-02-21 DIAGNOSIS — R07.89 CHEST PRESSURE: ICD-10-CM

## 2023-02-21 DIAGNOSIS — I16.0 HYPERTENSIVE URGENCY: Primary | ICD-10-CM

## 2023-02-21 DIAGNOSIS — I10 HYPERTENSION, ESSENTIAL: ICD-10-CM

## 2023-02-21 DIAGNOSIS — R07.9 CHEST PAIN: ICD-10-CM

## 2023-02-21 PROBLEM — Z87.898 HISTORY OF BRADYCARDIA: Status: ACTIVE | Noted: 2023-02-21

## 2023-02-21 LAB
ALBUMIN SERPL BCP-MCNC: 3.5 G/DL (ref 3.5–5.2)
ALP SERPL-CCNC: 65 U/L (ref 55–135)
ALT SERPL W/O P-5'-P-CCNC: 10 U/L (ref 10–44)
ANION GAP SERPL CALC-SCNC: 9 MMOL/L (ref 8–16)
ASCENDING AORTA: 3.8 CM
AST SERPL-CCNC: 15 U/L (ref 10–40)
AV INDEX (PROSTH): 1.08
AV MEAN GRADIENT: 3 MMHG
AV PEAK GRADIENT: 5 MMHG
AV VALVE AREA: 4.54 CM2
AV VELOCITY RATIO: 1.09
BASOPHILS # BLD AUTO: 0.08 K/UL (ref 0–0.2)
BASOPHILS NFR BLD: 0.8 % (ref 0–1.9)
BILIRUB SERPL-MCNC: 0.3 MG/DL (ref 0.1–1)
BNP SERPL-MCNC: 436 PG/ML (ref 0–99)
BSA FOR ECHO PROCEDURE: 1.93 M2
BUN SERPL-MCNC: 21 MG/DL (ref 8–23)
CALCIUM SERPL-MCNC: 9 MG/DL (ref 8.7–10.5)
CHLORIDE SERPL-SCNC: 111 MMOL/L (ref 95–110)
CHOLEST SERPL-MCNC: 191 MG/DL (ref 120–199)
CHOLEST/HDLC SERPL: 4.2 {RATIO} (ref 2–5)
CK SERPL-CCNC: 67 U/L (ref 20–200)
CO2 SERPL-SCNC: 21 MMOL/L (ref 23–29)
CREAT SERPL-MCNC: 1 MG/DL (ref 0.5–1.4)
CV ECHO LV RWT: 0.51 CM
D DIMER PPP IA.FEU-MCNC: 3.76 MG/L FEU
DIFFERENTIAL METHOD: ABNORMAL
DOP CALC AO PEAK VEL: 1.14 M/S
DOP CALC AO VTI: 27.1 CM
DOP CALC LVOT AREA: 4.2 CM2
DOP CALC LVOT DIAMETER: 2.31 CM
DOP CALC LVOT PEAK VEL: 1.24 M/S
DOP CALC LVOT STROKE VOLUME: 123.15 CM3
DOP CALCLVOT PEAK VEL VTI: 29.4 CM
E WAVE DECELERATION TIME: 198.65 MSEC
E/A RATIO: 1.13
E/E' RATIO: 11.43 M/S
ECHO LV POSTERIOR WALL: 1.17 CM (ref 0.6–1.1)
EJECTION FRACTION: 70 %
EOSINOPHIL # BLD AUTO: 0.2 K/UL (ref 0–0.5)
EOSINOPHIL NFR BLD: 2.4 % (ref 0–8)
ERYTHROCYTE [DISTWIDTH] IN BLOOD BY AUTOMATED COUNT: 14.2 % (ref 11.5–14.5)
EST. GFR  (NO RACE VARIABLE): >60 ML/MIN/1.73 M^2
FRACTIONAL SHORTENING: 39 % (ref 28–44)
GLUCOSE SERPL-MCNC: 95 MG/DL (ref 70–110)
HCT VFR BLD AUTO: 45.7 % (ref 40–54)
HDLC SERPL-MCNC: 45 MG/DL (ref 40–75)
HDLC SERPL: 23.6 % (ref 20–50)
HGB BLD-MCNC: 15.3 G/DL (ref 14–18)
IMM GRANULOCYTES # BLD AUTO: 0.06 K/UL (ref 0–0.04)
IMM GRANULOCYTES NFR BLD AUTO: 0.6 % (ref 0–0.5)
INTERVENTRICULAR SEPTUM: 1.17 CM (ref 0.6–1.1)
IVC DIAMETER: 18 CM
IVRT: 85.63 MSEC
LA MAJOR: 5.4 CM
LA MINOR: 4.56 CM
LA WIDTH: 4.6 CM
LDLC SERPL CALC-MCNC: 128.8 MG/DL (ref 63–159)
LEFT ATRIUM SIZE: 3.67 CM
LEFT ATRIUM VOLUME INDEX: 36.8 ML/M2
LEFT ATRIUM VOLUME: 70.95 CM3
LEFT INTERNAL DIMENSION IN SYSTOLE: 2.77 CM (ref 2.1–4)
LEFT VENTRICLE DIASTOLIC VOLUME INDEX: 49.3 ML/M2
LEFT VENTRICLE DIASTOLIC VOLUME: 95.14 ML
LEFT VENTRICLE MASS INDEX: 101 G/M2
LEFT VENTRICLE SYSTOLIC VOLUME INDEX: 14.9 ML/M2
LEFT VENTRICLE SYSTOLIC VOLUME: 28.76 ML
LEFT VENTRICULAR INTERNAL DIMENSION IN DIASTOLE: 4.56 CM (ref 3.5–6)
LEFT VENTRICULAR MASS: 195.03 G
LV LATERAL E/E' RATIO: 11.43 M/S
LV SEPTAL E/E' RATIO: 11.43 M/S
LVOT MG: 2.96 MMHG
LVOT MV: 0.79 CM/S
LYMPHOCYTES # BLD AUTO: 2.6 K/UL (ref 1–4.8)
LYMPHOCYTES NFR BLD: 27.2 % (ref 18–48)
MAGNESIUM SERPL-MCNC: 1.9 MG/DL (ref 1.6–2.6)
MCH RBC QN AUTO: 29.3 PG (ref 27–31)
MCHC RBC AUTO-ENTMCNC: 33.5 G/DL (ref 32–36)
MCV RBC AUTO: 87 FL (ref 82–98)
MONOCYTES # BLD AUTO: 1 K/UL (ref 0.3–1)
MONOCYTES NFR BLD: 10.8 % (ref 4–15)
MV PEAK A VEL: 0.71 M/S
MV PEAK E VEL: 0.8 M/S
MV STENOSIS PRESSURE HALF TIME: 57.61 MS
MV VALVE AREA P 1/2 METHOD: 3.82 CM2
NEUTROPHILS # BLD AUTO: 5.6 K/UL (ref 1.8–7.7)
NEUTROPHILS NFR BLD: 58.2 % (ref 38–73)
NONHDLC SERPL-MCNC: 146 MG/DL
NRBC BLD-RTO: 0 /100 WBC
PISA TR MAX VEL: 1.53 M/S
PLATELET # BLD AUTO: 205 K/UL (ref 150–450)
PMV BLD AUTO: 9.7 FL (ref 9.2–12.9)
POTASSIUM SERPL-SCNC: 4 MMOL/L (ref 3.5–5.1)
PROT SERPL-MCNC: 7.1 G/DL (ref 6–8.4)
PULM VEIN S/D RATIO: 1.66
PV PEAK D VEL: 0.41 M/S
PV PEAK S VEL: 0.68 M/S
PV PEAK VELOCITY: 1.1 CM/S
RA MAJOR: 4.6 CM
RA PRESSURE: 3 MMHG
RA WIDTH: 4.24 CM
RBC # BLD AUTO: 5.23 M/UL (ref 4.6–6.2)
RIGHT VENTRICULAR END-DIASTOLIC DIMENSION: 3.75 CM
SINUS: 3.32 CM
SODIUM SERPL-SCNC: 141 MMOL/L (ref 136–145)
STJ: 2.97 CM
TDI LATERAL: 0.07 M/S
TDI SEPTAL: 0.07 M/S
TDI: 0.07 M/S
TR MAX PG: 9 MMHG
TRICUSPID ANNULAR PLANE SYSTOLIC EXCURSION: 3.18 CM
TRIGL SERPL-MCNC: 86 MG/DL (ref 30–150)
TROPONIN I SERPL DL<=0.01 NG/ML-MCNC: 0.01 NG/ML (ref 0–0.03)
TROPONIN I SERPL DL<=0.01 NG/ML-MCNC: 0.02 NG/ML (ref 0–0.03)
TSH SERPL DL<=0.005 MIU/L-ACNC: 0.45 UIU/ML (ref 0.4–4)
TV PEAK GRADIENT: 1.25 MMHG
TV REST PULMONARY ARTERY PRESSURE: 12 MMHG
WBC # BLD AUTO: 9.63 K/UL (ref 3.9–12.7)

## 2023-02-21 PROCEDURE — G0378 HOSPITAL OBSERVATION PER HR: HCPCS

## 2023-02-21 PROCEDURE — 83735 ASSAY OF MAGNESIUM: CPT | Performed by: EMERGENCY MEDICINE

## 2023-02-21 PROCEDURE — 85379 FIBRIN DEGRADATION QUANT: CPT | Performed by: NURSE PRACTITIONER

## 2023-02-21 PROCEDURE — 80053 COMPREHEN METABOLIC PANEL: CPT | Performed by: EMERGENCY MEDICINE

## 2023-02-21 PROCEDURE — 63600175 PHARM REV CODE 636 W HCPCS: Performed by: EMERGENCY MEDICINE

## 2023-02-21 PROCEDURE — 96376 TX/PRO/DX INJ SAME DRUG ADON: CPT

## 2023-02-21 PROCEDURE — 25000242 PHARM REV CODE 250 ALT 637 W/ HCPCS: Performed by: EMERGENCY MEDICINE

## 2023-02-21 PROCEDURE — 99223 PR INITIAL HOSPITAL CARE,LEVL III: ICD-10-PCS | Mod: 25,,, | Performed by: INTERNAL MEDICINE

## 2023-02-21 PROCEDURE — 25500020 PHARM REV CODE 255: Performed by: INTERNAL MEDICINE

## 2023-02-21 PROCEDURE — 80061 LIPID PANEL: CPT | Performed by: EMERGENCY MEDICINE

## 2023-02-21 PROCEDURE — 99285 EMERGENCY DEPT VISIT HI MDM: CPT | Mod: 25

## 2023-02-21 PROCEDURE — 25000003 PHARM REV CODE 250: Performed by: NURSE PRACTITIONER

## 2023-02-21 PROCEDURE — 93010 ELECTROCARDIOGRAM REPORT: CPT | Mod: ,,, | Performed by: INTERNAL MEDICINE

## 2023-02-21 PROCEDURE — 84443 ASSAY THYROID STIM HORMONE: CPT | Performed by: EMERGENCY MEDICINE

## 2023-02-21 PROCEDURE — 93010 EKG 12-LEAD: ICD-10-PCS | Mod: ,,, | Performed by: INTERNAL MEDICINE

## 2023-02-21 PROCEDURE — 84484 ASSAY OF TROPONIN QUANT: CPT | Mod: 91 | Performed by: EMERGENCY MEDICINE

## 2023-02-21 PROCEDURE — 82550 ASSAY OF CK (CPK): CPT | Performed by: EMERGENCY MEDICINE

## 2023-02-21 PROCEDURE — 85025 COMPLETE CBC W/AUTO DIFF WBC: CPT | Performed by: EMERGENCY MEDICINE

## 2023-02-21 PROCEDURE — 83880 ASSAY OF NATRIURETIC PEPTIDE: CPT | Performed by: EMERGENCY MEDICINE

## 2023-02-21 PROCEDURE — 63600175 PHARM REV CODE 636 W HCPCS: Performed by: NURSE PRACTITIONER

## 2023-02-21 PROCEDURE — 25000003 PHARM REV CODE 250: Performed by: EMERGENCY MEDICINE

## 2023-02-21 PROCEDURE — 99223 1ST HOSP IP/OBS HIGH 75: CPT | Mod: 25,,, | Performed by: INTERNAL MEDICINE

## 2023-02-21 PROCEDURE — 96374 THER/PROPH/DIAG INJ IV PUSH: CPT

## 2023-02-21 PROCEDURE — 93005 ELECTROCARDIOGRAM TRACING: CPT

## 2023-02-21 RX ORDER — HYDRALAZINE HYDROCHLORIDE 25 MG/1
25 TABLET, FILM COATED ORAL 3 TIMES DAILY
Status: DISCONTINUED | OUTPATIENT
Start: 2023-02-21 | End: 2023-02-21 | Stop reason: HOSPADM

## 2023-02-21 RX ORDER — ASPIRIN 81 MG/1
81 TABLET ORAL DAILY
Status: DISCONTINUED | OUTPATIENT
Start: 2023-02-22 | End: 2023-02-21 | Stop reason: HOSPADM

## 2023-02-21 RX ORDER — ASPIRIN 325 MG
325 TABLET ORAL
Status: COMPLETED | OUTPATIENT
Start: 2023-02-21 | End: 2023-02-21

## 2023-02-21 RX ORDER — ATORVASTATIN CALCIUM 10 MG/1
40 TABLET, FILM COATED ORAL NIGHTLY
Status: DISCONTINUED | OUTPATIENT
Start: 2023-02-21 | End: 2023-02-21 | Stop reason: HOSPADM

## 2023-02-21 RX ORDER — MAG HYDROX/ALUMINUM HYD/SIMETH 200-200-20
30 SUSPENSION, ORAL (FINAL DOSE FORM) ORAL ONCE
Status: COMPLETED | OUTPATIENT
Start: 2023-02-21 | End: 2023-02-21

## 2023-02-21 RX ORDER — NITROGLYCERIN 0.4 MG/1
0.4 TABLET SUBLINGUAL
Status: COMPLETED | OUTPATIENT
Start: 2023-02-21 | End: 2023-02-21

## 2023-02-21 RX ORDER — AMLODIPINE BESYLATE 10 MG/1
10 TABLET ORAL DAILY
Qty: 90 TABLET | Refills: 3 | Status: SHIPPED | OUTPATIENT
Start: 2023-02-21

## 2023-02-21 RX ORDER — HYDRALAZINE HYDROCHLORIDE 25 MG/1
25 TABLET, FILM COATED ORAL 3 TIMES DAILY
Qty: 90 TABLET | Refills: 3 | OUTPATIENT
Start: 2023-02-21 | End: 2023-04-28

## 2023-02-21 RX ORDER — AMLODIPINE BESYLATE 5 MG/1
10 TABLET ORAL DAILY
Status: DISCONTINUED | OUTPATIENT
Start: 2023-02-21 | End: 2023-02-21 | Stop reason: HOSPADM

## 2023-02-21 RX ORDER — SODIUM CHLORIDE 0.9 % (FLUSH) 0.9 %
10 SYRINGE (ML) INJECTION
Status: DISCONTINUED | OUTPATIENT
Start: 2023-02-21 | End: 2023-02-21 | Stop reason: HOSPADM

## 2023-02-21 RX ORDER — HYDRALAZINE HYDROCHLORIDE 20 MG/ML
10 INJECTION INTRAMUSCULAR; INTRAVENOUS
Status: COMPLETED | OUTPATIENT
Start: 2023-02-21 | End: 2023-02-21

## 2023-02-21 RX ORDER — HYDRALAZINE HYDROCHLORIDE 20 MG/ML
10 INJECTION INTRAMUSCULAR; INTRAVENOUS ONCE
Status: COMPLETED | OUTPATIENT
Start: 2023-02-21 | End: 2023-02-21

## 2023-02-21 RX ORDER — LIDOCAINE HYDROCHLORIDE 20 MG/ML
15 SOLUTION OROPHARYNGEAL ONCE
Status: COMPLETED | OUTPATIENT
Start: 2023-02-21 | End: 2023-02-21

## 2023-02-21 RX ORDER — ENOXAPARIN SODIUM 100 MG/ML
40 INJECTION SUBCUTANEOUS EVERY 24 HOURS
Status: DISCONTINUED | OUTPATIENT
Start: 2023-02-21 | End: 2023-02-21 | Stop reason: HOSPADM

## 2023-02-21 RX ORDER — ACETAMINOPHEN 325 MG/1
650 TABLET ORAL EVERY 8 HOURS PRN
Status: DISCONTINUED | OUTPATIENT
Start: 2023-02-21 | End: 2023-02-21 | Stop reason: HOSPADM

## 2023-02-21 RX ADMIN — ACETAMINOPHEN 650 MG: 325 TABLET ORAL at 08:02

## 2023-02-21 RX ADMIN — NITROGLYCERIN 0.4 MG: 0.4 TABLET, ORALLY DISINTEGRATING SUBLINGUAL at 03:02

## 2023-02-21 RX ADMIN — LIDOCAINE HYDROCHLORIDE 15 ML: 20 SOLUTION ORAL; TOPICAL at 03:02

## 2023-02-21 RX ADMIN — ALUMINUM HYDROXIDE, MAGNESIUM HYDROXIDE, AND SIMETHICONE 30 ML: 200; 200; 20 SUSPENSION ORAL at 03:02

## 2023-02-21 RX ADMIN — HYDRALAZINE HYDROCHLORIDE 25 MG: 25 TABLET, FILM COATED ORAL at 05:02

## 2023-02-21 RX ADMIN — IOHEXOL 75 ML: 350 INJECTION, SOLUTION INTRAVENOUS at 11:02

## 2023-02-21 RX ADMIN — AMLODIPINE BESYLATE 10 MG: 5 TABLET ORAL at 08:02

## 2023-02-21 RX ADMIN — ASPIRIN 325 MG ORAL TABLET 325 MG: 325 PILL ORAL at 03:02

## 2023-02-21 RX ADMIN — HYDRALAZINE HYDROCHLORIDE 10 MG: 20 INJECTION INTRAMUSCULAR; INTRAVENOUS at 11:02

## 2023-02-21 RX ADMIN — HYDRALAZINE HYDROCHLORIDE 10 MG: 20 INJECTION INTRAMUSCULAR; INTRAVENOUS at 03:02

## 2023-02-21 NOTE — H&P
[facility] Emergency Dept  [facility] Medicine  History & Physical    Patient Name: Antolin Richardson  MRN: [id number]  Patient Class: OP- Observation  Admission Date: 2/21/2023  Attending Physician: Wei Frank MD   Primary Care Provider: Sayda Cheung MD         Patient information was obtained from patient and ER records.     Subjective:     Principal Problem:Coronary artery disease involving native coronary artery of native heart without angina pectoris    Chief Complaint:   Chief Complaint   Patient presents with    Chest Pain     Tightness and pressure X 1 and a half hours, was already awake, pressure is in the middle of his chest and does not radiate or move anywhere, has nausea but denies emesis or SOB        HPI: Antolin Galeana is a 72-year-old male with significant history of CAD status post MI with stent prior to 2005, peripheral artery disease status, AAA status post into repair,  current 1/2 a pack a day smoker, hypertension and hyperlipidemia who presents to the hospital for acute nonradiating midsternal chest tightness that occurred at 2 a.m. after he let his dogs out.  Symptoms associated with nausea vomiting and diaphoresis.  Patient took home aspirin and wife drove patient to ED. patient moved back to [place] 2 weeks ago from [place].  Patient drove 18 hours here.  Denies both lower extremity edema or pain.   In ED EKG sinus Arik.  Chest tightness resolved after nitro and aspirin.  Troponin trend times -3 however D-dimer positive.       Past Medical History:   Diagnosis Date    AAA (abdominal aortic aneurysm) 12/2016    s/p endo repair ([facility])    Acid reflux     Anxiety     Coronary artery disease     Coronary artery disease involving native coronary artery of native heart without angina pectoris 1/3/2017    Hypertension     Myocardial infarction        Past Surgical History:   Procedure Laterality Date    ABDOMINAL AORTIC ANEURYSM REPAIR      APPENDECTOMY       COLONOSCOPY N/A 10/25/2018    Procedure: COLONOSCOPY;  Surgeon: Jackie Linares MD;  Location: [facility] ENDO;  Service: Endoscopy;  Laterality: N/A;    CORONARY ANGIOPLASTY  2009    coronary artery stent      x1    CYSTOSCOPY N/A 10/22/2018    Procedure: CYSTOSCOPY;  Surgeon: Poly Marcum MD;  Location: [facility] OR;  Service: Urology;  Laterality: N/A;    stent for abdominal aneurysm      TRANSURETHRAL RESECTION OF PROSTATE N/A 8/1/2018    Procedure: TRANSURETHRAL RESECTION OF PROSTATE (TURP);  Surgeon: Poly Marcum MD;  Location: [facility] OR;  Service: Urology;  Laterality: N/A;       Review of patient's allergies indicates:   Allergen Reactions    Lisinopril Itching       No current facility-administered medications on file prior to encounter.     Current Outpatient Medications on File Prior to Encounter   Medication Sig    amLODIPine (NORVASC) 10 MG tablet Take 1 tablet (10 mg total) by mouth once daily.    aspirin (ECOTRIN) 81 MG EC tablet Take 1 tablet (81 mg total) by mouth once daily.    atorvastatin (LIPITOR) 40 MG tablet Take 1 tablet (40 mg total) by mouth every evening.    gabapentin (NEURONTIN) 300 MG capsule TAKE 1 CAPSULE(300 MG) BY MOUTH EVERY EVENING    metoprolol succinate (TOPROL-XL) 100 MG 24 hr tablet Take 1 tablet (100 mg total) by mouth once daily.    mirabegron (MYRBETRIQ) 50 mg Tb24 Take 1 tablet (50 mg total) by mouth once daily.    oxybutynin (DITROPAN-XL) 10 MG 24 hr tablet TAKE 1 TABLET BY MOUTH ONCE DAILY    polyethylene glycol (GLYCOLAX) 17 gram/dose powder 1/2 to 1 cap daily to prevent constipation on pain meds     Family History       Problem Relation (Age of Onset)    Cancer Mother    Dementia Father    Hypertension Father          Tobacco Use    Smoking status: Every Day     Packs/day: 2.00     Years: 20.00     Pack years: 40.00     Types: Cigarettes    Smokeless tobacco: Never   Substance and Sexual Activity    Alcohol use: Yes     Comment: occasionally     Drug use: No    Sexual activity: Yes     Partners: Female     Review of Systems   Constitutional:  Negative for activity change and appetite change.   HENT: Negative.     Eyes: Negative.    Respiratory:  Positive for chest tightness and shortness of breath. Negative for wheezing.    Cardiovascular:  Positive for chest pain. Negative for palpitations and leg swelling.   Gastrointestinal: Negative.    Genitourinary: Negative.    Neurological:  Positive for headaches. Negative for weakness.   Psychiatric/Behavioral: Negative.     Objective:     Vital Signs (Most Recent):  Temp: 98.5 °F (36.9 °C) (02/21/23 1002)  Pulse: (!) 53 (02/21/23 1002)  Resp: (!) 26 (02/21/23 1001)  BP: (!) 175/79 (02/21/23 1002)  SpO2: 96 % (02/21/23 1001)   Vital Signs (24h Range):  Temp:  [97.8 °F (36.6 °C)-98.5 °F (36.9 °C)] 98.5 °F (36.9 °C)  Pulse:  [47-57] 53  Resp:  [15-26] 26  SpO2:  [96 %-100 %] 96 %  BP: (156-225)/() 175/79     Weight: 75.3 kg (166 lb)  Body mass index is 23.82 kg/m².    Physical Exam  Constitutional:       Appearance: Normal appearance. He is not ill-appearing.   HENT:      Head: Normocephalic and atraumatic.   Eyes:      Extraocular Movements: Extraocular movements intact.      Pupils: Pupils are equal, round, and reactive to light.   Cardiovascular:      Rate and Rhythm: Normal rate and regular rhythm.      Pulses: Normal pulses.      Heart sounds: Normal heart sounds.   Pulmonary:      Effort: Pulmonary effort is normal.      Breath sounds: Normal breath sounds.   Abdominal:      Palpations: Abdomen is soft.   Musculoskeletal:         General: Normal range of motion.      Cervical back: Normal range of motion and neck supple.      Right lower leg: No edema.      Left lower leg: No edema.   Skin:     General: Skin is warm.   Neurological:      General: No focal deficit present.      Mental Status: He is alert. Mental status is at baseline.   Psychiatric:         Mood and Affect: Mood normal.          CRANIAL NERVES     CN III, IV, VI   Pupils are equal, round, and reactive to light.     Significant Labs: All pertinent labs within the past 24 hours have been reviewed.    Significant Imaging: I have reviewed all pertinent imaging results/findings within the past 24 hours.    Assessment/Plan:     * Coronary artery disease involving native coronary artery of native heart without angina pectoris  Patient with history of CAD status post MI/stent prior to Lily who was brought by his wife to the ED for acute nonradiating midsternal chest tightness that occur at 2:00 a.m. after letting his dog out.  Symptoms associated nausea vomiting and diaphoresis.  EKG sinus Arik and troponin trend x3 negative  Symptoms resolved after aspirin and nitro in ED  Patient with recent travel from [place] to [place] 2 weeks ago 18 hour drive  Positive D-dimer we will get ultrasound both lower extremity and CT  Cardiology following offered left heart catheterization however patient would like outpatient ischemic workup  If CT negative for PE then patient will be discharged home  Continue aspirin statin will hold metoprolol given history of bradycardia      History of bradycardia  History of bradycardia in which metoprolol was stopped February 2022.  EKG sinus Arik currently on monitor heart rate 60s  Continue to hold metoprolol      Mixed hyperlipidemia  Add lipid panel continue home statin        Abdominal aortic aneurysm  Status post repair.  Discussed tight control blood pressure and stop smoking      Current every day smoker  Smokes 1.5 packs a day.  Encourage smoking cessation.      Hypertension, essential  Uncontrolled.  Recently amlodipine was stopped.  Resume home amlodipine and metoprolol with parameters    VTE Risk Mitigation (From admission, onward)         Ordered     enoxaparin injection 40 mg  Daily         02/21/23 1051     Place BÁRBARA hose  Until discontinued         02/21/23 1051     Place sequential  compression device  Until discontinued         02/21/23 1051               As clarification February 21, 2023 patient placed in observation under my care in collaboration with Dr. Wei Black.    Josefina Alarcon NP  Department of Hospital Medicine   [facility] [place] - Emergency Dept

## 2023-02-21 NOTE — HPI
Antolin Galeana is a 72-year-old male with significant history of CAD status post MI with stent prior to 2005, peripheral artery disease status, AAA status post into repair,  current 1/2 a pack a day smoker, hypertension and hyperlipidemia who presents to the hospital for acute nonradiating midsternal chest tightness that occurred at 2 a.m. after he let his dogs out.  Symptoms associated with nausea vomiting and diaphoresis.  Patient took home aspirin and wife drove patient to ED. patient moved back to [place] 2 weeks ago from [place].  Patient drove 18 hours here.  Denies both lower extremity edema or pain.   In ED EKG sinus Arik.  Chest tightness resolved after nitro and aspirin.  Troponin trend times -3 however D-dimer positive.

## 2023-02-21 NOTE — ED NOTES
Assumed care of patient and AAOx4, stable,on cardiac monitor with continuous O2 monitoring, NAD noted.

## 2023-02-21 NOTE — CONSULTS
"[facility] - Emergency Dept  Cardiology  Consult Note    Patient Name: Antolin Richardson  MRN: [id number]  Admission Date: 2/21/2023  Hospital Length of Stay: 0 days  Code Status: Full Code   Attending Provider: Wei Frank MD   Consulting Provider: Geronimo Bautista MD  Primary Care Physician: Sayda Cheung MD  Principal Problem:<principal problem not specified>    Patient information was obtained from patient and ER records.     Consults  Subjective:     Chief Complaint:  CP     HPI:      71 yo M presenting to the ED with chest pain. His past medical history is significant for hypertension, hyperlipidemia, CAD (s/p PCI 2009), h/o MI, AAA (s/p endo repair 12/2016), tobacco abuse. He started experiencing acute, non-radiating mid-sternal chest "tightness, maybe ingestion" pain around 1h PTA today. States pain has been alleviating since onset. Denies worsening pain with deep inspiration. Also reports associated nausea. Attempted Tx with baby ASA. Denies cough, dyspnea, abdominal pain, or other associated symptoms.      Reports CP relieved after NTG, denies SOB  EKG sinus bradycardia 53 otherwise ok  Troponin negative x 3      Had echo and stress test 5/2022 in [place] - results not viewable in care everywhere. Patient reports they were ok    Previously saw Dr Michael  # AAA s/p endovasc repair 12/2016 ([facility])  # HTN, controlled  # CAD, hx of distant PCI, MPI 1/2017 normal  # HLP, on atorva 40mg  # tob abuse, pt again strongly encouraged to quit.  He has previously been enrolled in the smoking class.    CTA abd/pel 11/20/17  Interval placement of aortobiiliac endovascular stent graft which is patent with no extravasation and with partial collapse of the native surrounding aneurysm sac.  Additional findings as detailed above including enlarged prostate gland, diverticulosis without CT findings of acute diverticulitis, left renal masses suggesting low density and high density cysts.     Ex " MPI 1/10/17  The patient exercised for 5.5 minutes on a High Ramp protocol, corresponding to a functional capacity of 7 estimated METS, achieving a peak heart rate of 151 bpm, which is 97% of the age predicted maximum heart rate. -CP/EKG.  Nuclear Quantitative Functional Analysis:   LVEF: 49 %  Impression: NORMAL MYOCARDIAL PERFUSION  1. The perfusion scan is free of evidence for myocardial ischemia or injury.   2. There is a mild intensity fixed defect in the inferior wall of the left ventricle, secondary to diaphragm attenuation.   3. Resting wall motion is physiologic.   4. There is resting LV dysfunction with a reduced ejection fraction of 49 %.   5. The ventricular volumes are normal at rest and stress.   6. The extracardiac distribution of radioactivity is normal.   7. When compared to the previous study from 03/11/2014, no change     LE art US 1/9/17  1.  With regards to the right lower extremity there is no evidence of hemodynamically significant peripheral vascular disease. LASHAUN 1.07  2.  With regards to the left lower extremity there is no evidence of hemodynamically significant peripheral vascular disease. LASHAUN 1.08     Stress Test: L MPI 3/11/14  Nuclear Quantitative Functional Analysis:   LVEF: >= 70 %  Impression: NORMAL MYOCARDIAL PERFUSION  1. The perfusion scan is free of evidence for myocardial ischemia or injury.   2. There is a moderate intensity fixed defect in the inferior wall of the left ventricle, secondary to diaphragm attenuation.   3. Resting wall motion is physiologic.   4. Resting LV function is normal.   5. The ventricular volumes are normal at rest and stress.   6. The extracardiac distribution of radioactivity is normal.       Past Medical History:   Diagnosis Date    AAA (abdominal aortic aneurysm) 12/2016    s/p endo repair ([facility])    Acid reflux     Anxiety     Coronary artery disease     Coronary artery disease involving native coronary artery of native heart without  angina pectoris 1/3/2017    Hypertension     Myocardial infarction        Past Surgical History:   Procedure Laterality Date    ABDOMINAL AORTIC ANEURYSM REPAIR      APPENDECTOMY      COLONOSCOPY N/A 10/25/2018    Procedure: COLONOSCOPY;  Surgeon: Jackie Linares MD;  Location: [facility] ENDO;  Service: Endoscopy;  Laterality: N/A;    CORONARY ANGIOPLASTY  2009    coronary artery stent      x1    CYSTOSCOPY N/A 10/22/2018    Procedure: CYSTOSCOPY;  Surgeon: Poly Marcum MD;  Location: [facility] OR;  Service: Urology;  Laterality: N/A;    stent for abdominal aneurysm      TRANSURETHRAL RESECTION OF PROSTATE N/A 8/1/2018    Procedure: TRANSURETHRAL RESECTION OF PROSTATE (TURP);  Surgeon: Poly Marcum MD;  Location: [facility] OR;  Service: Urology;  Laterality: N/A;       Review of patient's allergies indicates:   Allergen Reactions    Lisinopril Itching       No current facility-administered medications on file prior to encounter.     Current Outpatient Medications on File Prior to Encounter   Medication Sig    amLODIPine (NORVASC) 10 MG tablet Take 1 tablet (10 mg total) by mouth once daily.    aspirin (ECOTRIN) 81 MG EC tablet Take 1 tablet (81 mg total) by mouth once daily.    atorvastatin (LIPITOR) 40 MG tablet Take 1 tablet (40 mg total) by mouth every evening.    gabapentin (NEURONTIN) 300 MG capsule TAKE 1 CAPSULE(300 MG) BY MOUTH EVERY EVENING    metoprolol succinate (TOPROL-XL) 100 MG 24 hr tablet Take 1 tablet (100 mg total) by mouth once daily.    mirabegron (MYRBETRIQ) 50 mg Tb24 Take 1 tablet (50 mg total) by mouth once daily.    oxybutynin (DITROPAN-XL) 10 MG 24 hr tablet TAKE 1 TABLET BY MOUTH ONCE DAILY    polyethylene glycol (GLYCOLAX) 17 gram/dose powder 1/2 to 1 cap daily to prevent constipation on pain meds     Family History       Problem Relation (Age of Onset)    Cancer Mother    Dementia Father    Hypertension Father          Tobacco Use    Smoking status: Every Day      Packs/day: 2.00     Years: 20.00     Pack years: 40.00     Types: Cigarettes    Smokeless tobacco: Never   Substance and Sexual Activity    Alcohol use: Yes     Comment: occasionally    Drug use: No    Sexual activity: Yes     Partners: Female     Review of Systems   Constitutional: Negative for decreased appetite.   HENT:  Negative for ear discharge.    Eyes:  Negative for blurred vision.   Endocrine: Negative for polyphagia.   Skin:  Negative for nail changes.   Genitourinary:  Negative for bladder incontinence.   Neurological:  Negative for aphonia.   Psychiatric/Behavioral:  Negative for hallucinations.    Allergic/Immunologic: Negative for hives.   Objective:     Vital Signs (Most Recent):  Temp: 97.8 °F (36.6 °C) (02/21/23 0253)  Pulse: (!) 52 (02/21/23 0632)  Resp: 19 (02/21/23 0632)  BP: (!) 161/70 (02/21/23 0804)  SpO2: 98 % (02/21/23 0632)   Vital Signs (24h Range):  Temp:  [97.8 °F (36.6 °C)] 97.8 °F (36.6 °C)  Pulse:  [47-57] 52  Resp:  [15-20] 19  SpO2:  [97 %-100 %] 98 %  BP: (160-225)/() 161/70     Weight: 75.3 kg (166 lb)  Body mass index is 23.82 kg/m².    SpO2: 98 %         Intake/Output Summary (Last 24 hours) at 2/21/2023 0939  Last data filed at 2/21/2023 0503  Gross per 24 hour   Intake --   Output 700 ml   Net -700 ml       Lines/Drains/Airways       Peripheral Intravenous Line  Duration                  Peripheral IV - Single Lumen 02/21/23 0315 20 G Right Antecubital <1 day                    Physical Exam  Constitutional:       Appearance: He is well-developed.   HENT:      Head: Normocephalic and atraumatic.   Eyes:      Conjunctiva/sclera: Conjunctivae normal.      Pupils: Pupils are equal, round, and reactive to light.   Cardiovascular:      Rate and Rhythm: Normal rate.      Pulses: Intact distal pulses.      Heart sounds: Normal heart sounds.   Pulmonary:      Effort: Pulmonary effort is normal.      Breath sounds: Normal breath sounds.   Abdominal:      General: Bowel  sounds are normal.      Palpations: Abdomen is soft.   Musculoskeletal:         General: Normal range of motion.      Cervical back: Normal range of motion and neck supple.   Skin:     General: Skin is warm and dry.   Neurological:      Mental Status: He is alert and oriented to person, place, and time.       Significant Labs: All pertinent lab results from the last 24 hours have been reviewed.    Significant Imaging: Echocardiogram: 2D echo with color flow doppler: No results found for this or any previous visit.    Assessment and Plan:     Mixed hyperlipidemia  On statin    Coronary artery disease involving native coronary artery of native heart without angina pectoris  CP improved with NTG. EKG nonischemic. Troponin negative x 3. Discussed repeat stress test or C - he would like to go home and schedule ischemic evaluation as an out patient. Check today - ok for d/c if stable    Abdominal aortic aneurysm  S/P repair 2016    Current every day smoker  counseled    Hypertension, essential  stable        VTE Risk Mitigation (From admission, onward)    None          Thank you for your consult. I will follow-up with patient. Please contact us if you have any additional questions.    Geronimo Bautista MD  Cardiology   [facility] - Emergency Dept

## 2023-02-21 NOTE — SUBJECTIVE & OBJECTIVE
Past Medical History:   Diagnosis Date    AAA (abdominal aortic aneurysm) 12/2016    s/p endo repair ([place])    Acid reflux     Anxiety     Coronary artery disease     Coronary artery disease involving native coronary artery of native heart without angina pectoris 1/3/2017    Hypertension     Myocardial infarction        Past Surgical History:   Procedure Laterality Date    ABDOMINAL AORTIC ANEURYSM REPAIR      APPENDECTOMY      COLONOSCOPY N/A 10/25/2018    Procedure: COLONOSCOPY;  Surgeon: Jackie Linares MD;  Location: [facility] ENDO;  Service: Endoscopy;  Laterality: N/A;    CORONARY ANGIOPLASTY  2009    coronary artery stent      x1    CYSTOSCOPY N/A 10/22/2018    Procedure: CYSTOSCOPY;  Surgeon: Poly Marcum MD;  [facility]: [facility];  Service: Urology;  Laterality: N/A;    stent for abdominal aneurysm      TRANSURETHRAL RESECTION OF PROSTATE N/A 8/1/2018    Procedure: TRANSURETHRAL RESECTION OF PROSTATE (TURP);  Surgeon: Poly Marcum MD;  Location: [facility] OR;  Service: Urology;  Laterality: N/A;       Review of patient's allergies indicates:   Allergen Reactions    Lisinopril Itching       No current facility-administered medications on file prior to encounter.     Current Outpatient Medications on File Prior to Encounter   Medication Sig    amLODIPine (NORVASC) 10 MG tablet Take 1 tablet (10 mg total) by mouth once daily.    aspirin (ECOTRIN) 81 MG EC tablet Take 1 tablet (81 mg total) by mouth once daily.    atorvastatin (LIPITOR) 40 MG tablet Take 1 tablet (40 mg total) by mouth every evening.    gabapentin (NEURONTIN) 300 MG capsule TAKE 1 CAPSULE(300 MG) BY MOUTH EVERY EVENING    metoprolol succinate (TOPROL-XL) 100 MG 24 hr tablet Take 1 tablet (100 mg total) by mouth once daily.    mirabegron (MYRBETRIQ) 50 mg Tb24 Take 1 tablet (50 mg total) by mouth once daily.    oxybutynin (DITROPAN-XL) 10 MG 24 hr tablet TAKE 1 TABLET BY MOUTH ONCE DAILY    polyethylene glycol (GLYCOLAX) 17  gram/dose powder 1/2 to 1 cap daily to prevent constipation on pain meds     Family History       Problem Relation (Age of Onset)    Cancer Mother    Dementia Father    Hypertension Father          Tobacco Use    Smoking status: Every Day     Packs/day: 2.00     Years: 20.00     Pack years: 40.00     Types: Cigarettes    Smokeless tobacco: Never   Substance and Sexual Activity    Alcohol use: Yes     Comment: occasionally    Drug use: No    Sexual activity: Yes     Partners: Female     Review of Systems   Constitutional:  Negative for activity change and appetite change.   HENT: Negative.     Eyes: Negative.    Respiratory:  Positive for chest tightness and shortness of breath. Negative for wheezing.    Cardiovascular:  Positive for chest pain. Negative for palpitations and leg swelling.   Gastrointestinal: Negative.    Genitourinary: Negative.    Neurological:  Positive for headaches. Negative for weakness.   Psychiatric/Behavioral: Negative.     Objective:     Vital Signs (Most Recent):  Temp: 98.5 °F (36.9 °C) (02/21/23 1002)  Pulse: (!) 53 (02/21/23 1002)  Resp: (!) 26 (02/21/23 1001)  BP: (!) 175/79 (02/21/23 1002)  SpO2: 96 % (02/21/23 1001)   Vital Signs (24h Range):  Temp:  [97.8 °F (36.6 °C)-98.5 °F (36.9 °C)] 98.5 °F (36.9 °C)  Pulse:  [47-57] 53  Resp:  [15-26] 26  SpO2:  [96 %-100 %] 96 %  BP: (156-225)/() 175/79     Weight: 75.3 kg (166 lb)  Body mass index is 23.82 kg/m².    Physical Exam  Constitutional:       Appearance: Normal appearance. He is not ill-appearing.   HENT:      Head: Normocephalic and atraumatic.   Eyes:      Extraocular Movements: Extraocular movements intact.      Pupils: Pupils are equal, round, and reactive to light.   Cardiovascular:      Rate and Rhythm: Normal rate and regular rhythm.      Pulses: Normal pulses.      Heart sounds: Normal heart sounds.   Pulmonary:      Effort: Pulmonary effort is normal.      Breath sounds: Normal breath sounds.   Abdominal:       Palpations: Abdomen is soft.   Musculoskeletal:         General: Normal range of motion.      Cervical back: Normal range of motion and neck supple.      Right lower leg: No edema.      Left lower leg: No edema.   Skin:     General: Skin is warm.   Neurological:      General: No focal deficit present.      Mental Status: He is alert. Mental status is at baseline.   Psychiatric:         Mood and Affect: Mood normal.         CRANIAL NERVES     CN III, IV, VI   Pupils are equal, round, and reactive to light.     Significant Labs: All pertinent labs within the past 24 hours have been reviewed.    Significant Imaging: I have reviewed all pertinent imaging results/findings within the past 24 hours.

## 2023-02-21 NOTE — ED PROVIDER NOTES
"Encounter Date: 2/21/2023    SCRIBE #1 NOTE: I, Poly Price, am scribing for, and in the presence of,  Zurdo Saleem MD.     History     Chief Complaint   Patient presents with    Chest Pain     Tightness and pressure X 1 and a half hours, was already awake, pressure is in the middle of his chest and does not radiate or move anywhere, has nausea but denies emesis or SOB     71 yo M presenting to the ED with chest pain. His past medical history is significant for hypertension, hyperlipidemia, CAD (s/p PCI 2009), h/o MI, AAA (s/p endo repair 12/2016), tobacco abuse. He started experiencing acute, non-radiating mid-sternal chest "tightness, maybe ingestion" pain around 1h PTA today. States pain has been alleviating since onset. Denies worsening pain with deep inspiration. Also reports associated nausea. Attempted Tx with baby ASA. Denies cough, dyspnea, abdominal pain, or other associated symptoms.     The history is provided by the patient.   Review of patient's allergies indicates:   Allergen Reactions    Lisinopril Itching     Past Medical History:   Diagnosis Date    AAA (abdominal aortic aneurysm) 12/2016    s/p endo repair ([place])    Acid reflux     Anxiety     Coronary artery disease     Coronary artery disease involving native coronary artery of native heart without angina pectoris 1/3/2017    Hypertension     Myocardial infarction      Past Surgical History:   Procedure Laterality Date    ABDOMINAL AORTIC ANEURYSM REPAIR      APPENDECTOMY      COLONOSCOPY N/A 10/25/2018    Procedure: COLONOSCOPY;  Surgeon: Jackie Linares MD;  Location: [facility] ENDO;  Service: Endoscopy;  Laterality: N/A;    CORONARY ANGIOPLASTY  2009    coronary artery stent      x1    CYSTOSCOPY N/A 10/22/2018    Procedure: CYSTOSCOPY;  Surgeon: Poly Marcum MD;  Location: [facility] OR;  Service: Urology;  Laterality: N/A;    stent for abdominal aneurysm      TRANSURETHRAL RESECTION OF PROSTATE N/A 8/1/2018    Procedure: " TRANSURETHRAL RESECTION OF PROSTATE (TURP);  Surgeon: Poly Marcum MD;  Location: [facility];  Service: Urology;  Laterality: N/A;     Family History   Problem Relation Age of Onset    Cancer Mother         brain    Dementia Father         alzheimer's    Hypertension Father      Social History     Tobacco Use    Smoking status: Every Day     Packs/day: 2.00     Years: 20.00     Pack years: 40.00     Types: Cigarettes    Smokeless tobacco: Never   Substance Use Topics    Alcohol use: Yes     Comment: occasionally    Drug use: No     Review of Systems   Constitutional:  Negative for chills and fever.   HENT:  Negative for congestion.    Respiratory:  Negative for cough and shortness of breath.    Cardiovascular:  Positive for chest pain.   Gastrointestinal:  Positive for nausea. Negative for abdominal pain and diarrhea.   Genitourinary:  Negative for dysuria.   Musculoskeletal:  Negative for back pain.   Skin:  Negative for rash.   Neurological:  Negative for headaches.     Physical Exam     Initial Vitals [02/21/23 0253]   BP Pulse Resp Temp SpO2   (!) 225/107 (!) 56 18 97.8 °F (36.6 °C) 99 %      MAP       --         Physical Exam    Nursing note and vitals reviewed.  Constitutional: He appears well-developed. He is not diaphoretic. No distress.   HENT:   Head: Normocephalic.   Eyes: EOM are normal.   Cardiovascular:  Normal rate, regular rhythm and normal heart sounds.           No murmur heard.  Pulmonary/Chest: Effort normal and breath sounds normal. He has no wheezes.   Abdominal: Abdomen is soft. He exhibits no distension. There is no abdominal tenderness.   Musculoskeletal:         General: Normal range of motion.     Neurological: He is alert.   Skin: Skin is warm.       ED Course   Critical Care    Date/Time: 3/8/2023 7:05 AM  Performed by: Zurdo Saleme MD  Authorized by: Zurdo Saleem MD   Total critical care time (exclusive of procedural time) : 50 minutes  Critical care time was exclusive  of separately billable procedures and treating other patients and teaching time.  Critical care was necessary to treat or prevent imminent or life-threatening deterioration of the following conditions: cardiac failure and circulatory failure.  Critical care was time spent personally by me on the following activities: blood draw for specimens, development of treatment plan with patient or surrogate, interpretation of cardiac output measurements, evaluation of patient's response to treatment, examination of patient, obtaining history from patient or surrogate, ordering and performing treatments and interventions, ordering and review of laboratory studies, ordering and review of radiographic studies and pulse oximetry.  Comments: Hypertension requiring IV antihypertensives.      Labs Reviewed   CBC W/ AUTO DIFFERENTIAL - Abnormal; Notable for the following components:       Result Value    Immature Granulocytes 0.6 (*)     Immature Grans (Abs) 0.06 (*)     All other components within normal limits   COMPREHENSIVE METABOLIC PANEL - Abnormal; Notable for the following components:    Chloride 111 (*)     CO2 21 (*)     All other components within normal limits   B-TYPE NATRIURETIC PEPTIDE - Abnormal; Notable for the following components:     (*)     All other components within normal limits   D DIMER, QUANTITATIVE - Abnormal; Notable for the following components:    D-Dimer 3.76 (*)     All other components within normal limits   TROPONIN I   TROPONIN I   CK   LIPID PANEL   TSH   MAGNESIUM   LIPID PANEL   CK   TSH   MAGNESIUM   B-TYPE NATRIURETIC PEPTIDE        ECG Results              EKG 12-lead (Final result)  Result time 02/22/23 19:36:31      Final result by Interface, Lab In [facility] (02/22/23 19:36:31)                   Narrative:    Test Reason : R07.89,    Vent. Rate : 053 BPM     Atrial Rate : 053 BPM     P-R Int : 160 ms          QRS Dur : 082 ms      QT Int : 442 ms       P-R-T Axes : 055 082 076  degrees     QTc Int : 414 ms    Sinus bradycardia  Possible Left atrial enlargement  Borderline Abnormal ECG  When compared with ECG of 13-OCT-2021 14:29,  Significant changes have occurred  Confirmed by Geronimo Bautista MD ([id number]) on 2/22/2023 7:36:21 PM    Referred By: CHANDRA   SELF           Confirmed By:Geronimo Bautista MD                                     EKG 12-LEAD (Final result)  Result time 03/02/23 08:41:11      Final result by Unknown User (03/02/23 08:41:11)                                      Imaging Results              US Lower Extremity Veins Bilateral (Final result)  Result time 02/21/23 15:46:32      Final result by Duglas Mcmillan MD (02/21/23 15:46:32)                   Impression:      No evidence of deep venous thrombosis in either lower extremity.      Electronically signed by: Duglas Mcmillan MD  Date:    02/21/2023  Time:    15:46               Narrative:    EXAMINATION:  US LOWER EXTREMITY VEINS BILATERAL    CLINICAL HISTORY:  chest pain , recent travel. positive d diemr;    TECHNIQUE:  Duplex and color flow Doppler and dynamic compression was performed of the bilateral lower extremity veins was performed.    COMPARISON:  None    FINDINGS:  Right thigh veins: The common femoral, femoral, popliteal, upper greater saphenous, and deep femoral veins are patent and free of thrombus. The veins are normally compressible and have normal phasic flow and augmentation response.    Right calf veins: The visualized calf veins are patent.    Left thigh veins: The common femoral, femoral, popliteal, upper greater saphenous, and deep femoral veins are patent and free of thrombus. The veins are normally compressible and have normal phasic flow and augmentation response.    Left calf veins: The visualized calf veins are patent.    Miscellaneous: None                                       CT Head Without Contrast (Final result)  Result time 02/21/23 12:40:37      Final result by Felipe Banda MD (02/21/23  12:40:37)                   Impression:      No definite acute intracranial findings.      Electronically signed by: Felipe Banda  Date:    02/21/2023  Time:    12:40               Narrative:    EXAMINATION:  CT HEAD WITHOUT CONTRAST    CLINICAL HISTORY:  Headache, new or worsening (Age >= 50y);    TECHNIQUE:  Low dose axial images were obtained through the head.  Coronal and sagittal reformations were also performed. Contrast was not administered.    COMPARISON:  None.    FINDINGS:  Blood: Note that sensitivity for detection of recent hemorrhage is limited given residual circulating contrast from earlier same day CTA chest imaging.  Noting this, no definite acute renal hemorrhage is seen.    Parenchyma: No definite loss of gray-white differentiation to suggest acute or subacute transcortical infarct. Parenchymal volume loss.  Nonspecific areas of white matter hypoattenuation may relate to sequela of chronic small vessel ischemic disease.    Ventricles/Extra-axial spaces: No abnormal extra-axial fluid collection. Basal cisterns are patent.    Vessels: Atherosclerotic calcifications.    Orbits: Unremarkable.    Scalp: Unremarkable.    Skull: There are no depressed skull fractures or destructive bone lesions.    Sinuses and mastoids: Scattered relatively minimal paranasal sinus mucosal thickening with possible small retention cysts.    Other findings: None                                       CTA Chest Non-Coronary (PE Studies) (Final result)  Result time 02/21/23 11:21:18      Final result by Felipe Banda MD (02/21/23 11:21:18)                   Impression:      1. No convincing evidence of acute pulmonary embolus.  2. No definite acute intrathoracic findings.  3. Additional details, as provided in the body of the report.      Electronically signed by: Felipe Banda  Date:    02/21/2023  Time:    11:21               Narrative:    EXAMINATION:  CTA CHEST NON CORONARY (PE STUDIES)    CLINICAL  HISTORY:  Pulmonary embolism (PE) suspected, high prob;    TECHNIQUE:  Low dose axial images, sagittal and coronal reformations were obtained from the thoracic inlet to the lung bases following the IV administration of 75 mL of Omnipaque 350.  Contrast timing was optimized to evaluate the pulmonary arteries.  MIP images were performed.    COMPARISON:  Chest radiograph 02/21/2023.  CT chest 10/21/2020.    FINDINGS:  Exam quality: Satisfactory.    Pulmonary embolism: No acute or chronic pulmonary embolism.    Central pulmonary arteries: Normal caliber.    Aorta: Note that measurement of the aortic dimensions may be inaccurate due to motion.  Moderate calcified noncalcified atheromatous plaque.  Noting this, there appears to be aneurysmal dilatation of the ascending thoracic aorta to at least 4.3 cm.    Heart: No right heart strain. Normal size.    Coronary arteries: Atherosclerotic calcifications present.    Pericardium: Normal. No effusion, thickening, or calcification.    Support tubes and lines: None.    Base of neck/thyroid: Normal.    Lymph nodes: Enlarged right hilar lymph node measuring 16 mm short axis (series 2, image 271).  More inferior right hilar node measures 11 mm short axis (series 2, image 291).  Several additional prominent number, but normally sized mediastinal and hilar lymph nodes are noted elsewhere.  Some of these appears slightly decreased in size when compared to the 10/21/2020 CT.  Findings of uncertain etiology or significance, may be reactive.    Esophagus: Normal.    Pleura: No effusion, thickening, or calcification.    Upper abdomen: Partially imaged exophytic 14 mm lesion in the medial upper pole left kidney, similar configuration to 10/21/2020 CT chest.  This had previously been characterized as hyperdense cysts on prior CT urogram performed 12/05/2018.    Body wall: Unremarkable    Airways: Nonspecific debris noted proximal right mainstem bronchus.  Mild bronchial wall thickening most  evident at the level of the lower lobes.    Lungs: Assessment of the lungs limited due to respiratory motion.  Centrilobular predominant emphysema.  4-5 mm solid nodule in the right middle lobe (series 3, image 346); this appears relatively similar when compared to 10/21/2020 CT.  5-6 mm fissural based solid nodule in the left lung (series 3, image 215) also appears essentially unchanged since the 10/21/2020 CT.  Long-term stability of both nodules would favor a benign etiology.  Several calcified granuloma noted.    Bones: No focal lesion.                                       X-Ray Chest 1 View (Final result)  Result time 02/22/23 08:16:25      Final result by RADIOLOGIST, VIRTUAL (02/22/23 08:16:25)                   Impression:      1. Question ill-defined right basilar pneumonia. 2. Ectatic and/or tortuous thoracic aorta.      Electronically signed by: Virtual Radiologist  Date:    02/22/2023  Time:    08:16               Narrative:    EXAMINATION:  XR Chest    CLINICAL HISTORY:  Chest wall pain    TECHNIQUE:  Imaging protocol: Radiologic exam of the chest. Views: 1 view.    COMPARISON:  No relevant prior studies available.    FINDINGS:  Lungs: Ill-defined right basilar opacities are suspected. Pleural spaces: Unremarkable. No pleural effusion. No pneumothorax. Heart/Mediastinum: Heart size within normal limits. Vasculature: Question ectatic and/or tortuous thoracic aorta. Bones/joints: No acute findings.                                       Medications   aspirin tablet 325 mg (325 mg Oral Given 2/21/23 0328)   aluminum-magnesium hydroxide-simethicone 200-200-20 mg/5 mL suspension 30 mL (30 mLs Oral Given 2/21/23 0330)     And   LIDOcaine HCl 2% oral solution 15 mL (15 mLs Oral Given 2/21/23 0330)   nitroGLYCERIN SL tablet 0.4 mg (0.4 mg Sublingual Given 2/21/23 0330)   hydrALAZINE injection 10 mg (10 mg Intravenous Given 2/21/23 0329)   iohexoL (OMNIPAQUE 350) injection 75 mL (75 mLs Intravenous Given 2/21/23  1100)   hydrALAZINE injection 10 mg (10 mg Intravenous Given 2/21/23 1142)     Medical Decision Making:   History:   Old Medical Records: I decided to obtain old medical records.  Initial Assessment:     72-year-old male presenting for chest pain.  Reported nausea and chest tightness the middle of the night.  History of CAD with previous stent placement.  Heart score of 5.  No arrhythmia or ST changes on ECG.  Initial troponin within normal limits.  Moderate concern based on reported symptoms.  Symptoms improved.  Patient did necessitate IV dose of antihypertensive due to severely elevated blood pressure on arrival.  Differential Diagnosis:     ACS, chest wall pain  Independently Interpreted Test(s):   I have ordered and independently interpreted EKG Reading(s) - see prior notes  Clinical Tests:   Lab Tests: Ordered and Reviewed  Radiological Study: Ordered and Reviewed  Medical Tests: Ordered and Reviewed  ED Management:    Problems considered includes chest pain nausea (Signs & symptoms, differentials)  Chronic problems impacting care includes hypertension, hypercholesteremia, CAD.  Please see workup section for emergency physician independent ECG interpretation.  Imaging independently reviewed.  Agree with radiologist's interpretation. Imaging includes no infiltrates noted on chest x-ray  All labs reviewed and considered in the patient's differential diagnosis. Discussion of labs includes troponin within normal limits.  Prior records were reviewed and considered in the differential diagnosis. This includes evaluation for previous records.  No cardiac catheterization within the last year noted in our EMR.    Medications given/ considered includes sublingual nitro. Possible complications includes hypotension.    Plan was discussed with the patient.  This includes plan for admission, Cardiology consultation    All questions or concerns have been addressed.    Additional MDM:   Heart Score:    History:           Moderately suspicious.  ECG:             Normal  Age:               >65 years  Risk factors: >= 3 risk factors or history of atherosclerotic disease  Troponin:       Less than or equal to normal limit  Final Score: 5       Scribe Attestation:   Scribe #1: I performed the above scribed service and the documentation accurately describes the services I performed. I attest to the accuracy of the note.      ED Course as of 03/08/23 0705 Tue Feb 21, 2023   0304 EKG 12-lead  Time 2:51 a.m.     Rate 53, sinus, regular rhythm, normal axis.   QRS 82 .  No ST elevation or depression.  No T-wave inversion no Q-waves present     Normal sinus rhythm.   [JM]   0527 Had long discussion with the patient for admission due to heart score 5 and being high-risk patient.  Per EMR the patient has not had a cardiac catheterization within the last year.  Last catheterization roughly 5 years prior.  Recommendations for admission however patient like to check with his insurance 1st to see if his admission will be covered. [JM]   0624 Case discussed with Josefina.  [JM]      ED Course User Index  [JM] Zurdo Saleem MD                 Clinical Impression:   Final diagnoses:  [R07.89] Chest pressure  [R07.9] Chest pain  [I16.0] Hypertensive urgency (Primary)        ED Disposition Condition    Observation Stable        I, Zurdo Saleem, personally performed the services described in this documentation. All medical record entries made by the scribe were at my direction and in my presence. I have reviewed the chart and agree that the record reflects my personal performance and is accurate and complete.          Zurdo Saleem MD  02/21/23 0735       Zurdo Saleem MD  03/08/23 0705

## 2023-02-21 NOTE — DISCHARGE SUMMARY
[facility] Emergency Dept  Hospital Medicine  Discharge Summary      Patient Name: Antolin Richardson  MRN: [id number]  JACKSON: [id number]  Patient Class: OP- Observation  Admission Date: 2/21/2023  Hospital Length of Stay: 0 days  Discharge Date and Time: 2/21/23  Attending Physician: Wei Frank MD  Discharging Provider: Josefina Alarcon NP  Primary Care Provider: Sayda Cheung MD    Primary Care Team: Networked reference to record PCT     HPI:   Antolin Galeana is a 72-year-old male with significant history of CAD status post MI with stent prior to 2005, peripheral artery disease status, AAA status post into repair,  current 1/2 a pack a day smoker, hypertension and hyperlipidemia who presents to the hospital for acute nonradiating midsternal chest tightness that occurred at 2 a.m. after he let his dogs out.  Symptoms associated with nausea vomiting and diaphoresis.  Patient took home aspirin and wife drove patient to ED. patient moved back to [place] 2 weeks ago from [place].  Patient drove 18 hours here.  Denies both lower extremity edema or pain.   In ED EKG sinus Arik.  Chest tightness resolved after nitro and aspirin.  Troponin trend times -3 however D-dimer positive.       * No surgery found *      Hospital Course:   Admission for chest pain.  Patient with history of CAD status post MI stent prior to Lily.  ACS ruled out.  D-dimer positive both lower extremity ultrasound no DVT and CT no PE.  Cardiology offered left heart catheterization however patient would like outpatient ischemic workup.  Continue aspirin statin.  Continue to hold metoprolol secondary to bradycardia.  Patient hemodynamically stable for discharge home.   All findings and plan were explained to the patient. All questions and concerns were answered. Patient verbalized understanding. Patient is in stable condition to d/c home and has been informed to follow up with PCP and Cardiologist.         Goals of Care Treatment  Preferences:  Code Status: Full Code      Consults:       Cardiac/Vascular  * Coronary artery disease involving native coronary artery of native heart without angina pectoris  Patient with history of CAD status post MI/stent prior to Lily who was brought by his wife to the ED for acute nonradiating midsternal chest tightness that occur at 2:00 a.m. after letting his dog out.  Symptoms associated nausea vomiting and diaphoresis.  EKG sinus Arik and troponin trend x3 negative  Symptoms resolved after aspirin and nitro in ED  Patient with recent travel from [place] to [place] 2 weeks ago 18 hour drive  Positive D-dimer we will get ultrasound both lower extremity and CT  Cardiology following offered left heart catheterization however patient would like outpatient ischemic workup  If CT negative for PE then patient will be discharged home  Continue aspirin statin will hold metoprolol given history of bradycardia      History of bradycardia  History of bradycardia in which metoprolol was stopped February 2022.  EKG sinus Arik currently on monitor heart rate 60s  Continue to hold metoprolol      Mixed hyperlipidemia  Add lipid panel continue home statin        Abdominal aortic aneurysm  Status post repair.  Discussed tight control blood pressure and stop smoking      Hypertension, essential  Uncontrolled.  Recently amlodipine was stopped.  Resume home amlodipine and metoprolol with parameters    Other  Current every day smoker  Smokes 1.5 packs a day.  Encourage smoking cessation.      Final Active Diagnoses:    Diagnosis Date Noted POA    PRINCIPAL PROBLEM:  Coronary artery disease involving native coronary artery of native heart without angina pectoris [I25.10] 01/03/2017 Yes    History of bradycardia [Z87.898] 02/21/2023 Not Applicable    Mixed hyperlipidemia [E78.2] 03/02/2017 Yes    Abdominal aortic aneurysm [I71.40] 12/12/2016 Yes    Hypertension, essential [I10] 03/11/2014 Yes    Current every day  smoker [F17.200] 03/11/2014 Yes     Chronic      Problems Resolved During this Admission:       Discharged Condition: good    Disposition: Home or Self Care    Follow Up:   Follow-up Information       Geronimo Bautista MD. Schedule an appointment as soon as possible for a visit.    Specialty: Cardiology  Contact information:  [address]  [address]  [place] [address]  [phone]                           Patient Instructions:      Diet Cardiac     Notify your health care provider if you experience any of the following:  temperature >100.4     Notify your health care provider if you experience any of the following:  difficulty breathing or increased cough     Notify your health care provider if you experience any of the following:  persistent nausea and vomiting or diarrhea     Notify your health care provider if you experience any of the following:  severe uncontrolled pain     Activity as tolerated       Significant Diagnostic Studies: Labs: All labs within the past 24 hours have been reviewed    Pending Diagnostic Studies:       None           Medications:  Reconciled Home Medications:      Medication List        START taking these medications      hydrALAZINE 25 MG tablet  Commonly known as: APRESOLINE  Take 1 tablet (25 mg total) by mouth 3 (three) times daily.            CONTINUE taking these medications      amLODIPine 10 MG tablet  Commonly known as: NORVASC  Take 1 tablet (10 mg total) by mouth once daily.     aspirin 81 MG EC tablet  Commonly known as: ECOTRIN  Take 1 tablet (81 mg total) by mouth once daily.     atorvastatin 40 MG tablet  Commonly known as: LIPITOR  Take 1 tablet (40 mg total) by mouth every evening.     gabapentin 300 MG capsule  Commonly known as: NEURONTIN  TAKE 1 CAPSULE(300 MG) BY MOUTH EVERY EVENING     polyethylene glycol 17 gram/dose powder  Commonly known as: GLYCOLAX  1/2 to 1 cap daily to prevent constipation on pain meds            STOP taking these medications       metoprolol succinate 100 MG 24 hr tablet  Commonly known as: TOPROL-XL     mirabegron 50 mg Tb24  Commonly known as: MYRBETRIQ     oxybutynin 10 MG 24 hr tablet  Commonly known as: DITROPAN-XL              Indwelling Lines/Drains at time of discharge:   Lines/Drains/Airways       None                   Time spent on the discharge of patient: 30 minutes         Josefina Alarcon NP  Department of Hospital Medicine  [facility] - Emergency Dept

## 2023-02-21 NOTE — NURSING
Discharge instructions given to patient prior to discharge. Patient states understanding of information provided. IV discontinued tolerated well. All personal belongings with patient at the time of discharge. Ambulated independently to discharge.

## 2023-02-21 NOTE — ASSESSMENT & PLAN NOTE
Uncontrolled.  Recently amlodipine was stopped.  Resume home amlodipine and metoprolol with parameters

## 2023-02-21 NOTE — SUBJECTIVE & OBJECTIVE
Past Medical History:   Diagnosis Date    AAA (abdominal aortic aneurysm) 12/2016    s/p endo repair ([place])    Acid reflux     Anxiety     Coronary artery disease     Coronary artery disease involving native coronary artery of native heart without angina pectoris 1/3/2017    Hypertension     Myocardial infarction        Past Surgical History:   Procedure Laterality Date    ABDOMINAL AORTIC ANEURYSM REPAIR      APPENDECTOMY      COLONOSCOPY N/A 10/25/2018    Procedure: COLONOSCOPY;  Surgeon: Jackie Linares MD;  Location: [facility] ENDO;  Service: Endoscopy;  Laterality: N/A;    CORONARY ANGIOPLASTY  2009    coronary artery stent      x1    CYSTOSCOPY N/A 10/22/2018    Procedure: CYSTOSCOPY;  Surgeon: Poly Marcum MD;  Location: [facility];  Service: Urology;  Laterality: N/A;    stent for abdominal aneurysm      TRANSURETHRAL RESECTION OF PROSTATE N/A 8/1/2018    Procedure: TRANSURETHRAL RESECTION OF PROSTATE (TURP);  Surgeon: Poly Marcum MD;  Location: [facility] OR;  Service: Urology;  Laterality: N/A;       Review of patient's allergies indicates:   Allergen Reactions    Lisinopril Itching       No current facility-administered medications on file prior to encounter.     Current Outpatient Medications on File Prior to Encounter   Medication Sig    amLODIPine (NORVASC) 10 MG tablet Take 1 tablet (10 mg total) by mouth once daily.    aspirin (ECOTRIN) 81 MG EC tablet Take 1 tablet (81 mg total) by mouth once daily.    atorvastatin (LIPITOR) 40 MG tablet Take 1 tablet (40 mg total) by mouth every evening.    gabapentin (NEURONTIN) 300 MG capsule TAKE 1 CAPSULE(300 MG) BY MOUTH EVERY EVENING    metoprolol succinate (TOPROL-XL) 100 MG 24 hr tablet Take 1 tablet (100 mg total) by mouth once daily.    mirabegron (MYRBETRIQ) 50 mg Tb24 Take 1 tablet (50 mg total) by mouth once daily.    oxybutynin (DITROPAN-XL) 10 MG 24 hr tablet TAKE 1 TABLET BY MOUTH ONCE DAILY    polyethylene glycol (GLYCOLAX) 17  gram/dose powder 1/2 to 1 cap daily to prevent constipation on pain meds     Family History       Problem Relation (Age of Onset)    Cancer Mother    Dementia Father    Hypertension Father          Tobacco Use    Smoking status: Every Day     Packs/day: 2.00     Years: 20.00     Pack years: 40.00     Types: Cigarettes    Smokeless tobacco: Never   Substance and Sexual Activity    Alcohol use: Yes     Comment: occasionally    Drug use: No    Sexual activity: Yes     Partners: Female     Review of Systems   Constitutional: Negative for decreased appetite.   HENT:  Negative for ear discharge.    Eyes:  Negative for blurred vision.   Endocrine: Negative for polyphagia.   Skin:  Negative for nail changes.   Genitourinary:  Negative for bladder incontinence.   Neurological:  Negative for aphonia.   Psychiatric/Behavioral:  Negative for hallucinations.    Allergic/Immunologic: Negative for hives.   Objective:     Vital Signs (Most Recent):  Temp: 97.8 °F (36.6 °C) (02/21/23 0253)  Pulse: (!) 52 (02/21/23 0632)  Resp: 19 (02/21/23 0632)  BP: (!) 161/70 (02/21/23 0804)  SpO2: 98 % (02/21/23 0632)   Vital Signs (24h Range):  Temp:  [97.8 °F (36.6 °C)] 97.8 °F (36.6 °C)  Pulse:  [47-57] 52  Resp:  [15-20] 19  SpO2:  [97 %-100 %] 98 %  BP: (160-225)/() 161/70     Weight: 75.3 kg (166 lb)  Body mass index is 23.82 kg/m².    SpO2: 98 %         Intake/Output Summary (Last 24 hours) at 2/21/2023 0939  Last data filed at 2/21/2023 0503  Gross per 24 hour   Intake --   Output 700 ml   Net -700 ml       Lines/Drains/Airways       Peripheral Intravenous Line  Duration                  Peripheral IV - Single Lumen 02/21/23 0315 20 G Right Antecubital <1 day                    Physical Exam  Constitutional:       Appearance: He is well-developed.   HENT:      Head: Normocephalic and atraumatic.   Eyes:      Conjunctiva/sclera: Conjunctivae normal.      Pupils: Pupils are equal, round, and reactive to light.   Cardiovascular:       Rate and Rhythm: Normal rate.      Pulses: Intact distal pulses.      Heart sounds: Normal heart sounds.   Pulmonary:      Effort: Pulmonary effort is normal.      Breath sounds: Normal breath sounds.   Abdominal:      General: Bowel sounds are normal.      Palpations: Abdomen is soft.   Musculoskeletal:         General: Normal range of motion.      Cervical back: Normal range of motion and neck supple.   Skin:     General: Skin is warm and dry.   Neurological:      Mental Status: He is alert and oriented to person, place, and time.       Significant Labs: All pertinent lab results from the last 24 hours have been reviewed.    Significant Imaging: Echocardiogram: 2D echo with color flow doppler: No results found for this or any previous visit.

## 2023-02-21 NOTE — ASSESSMENT & PLAN NOTE
History of bradycardia in which metoprolol was stopped February 2022.  EKG sinus Arik currently on monitor heart rate 60s  Continue to hold metoprolol

## 2023-02-21 NOTE — HPI
"   71 yo M presenting to the ED with chest pain. His past medical history is significant for hypertension, hyperlipidemia, CAD (s/p PCI 2009), h/o MI, AAA (s/p endo repair 12/2016), tobacco abuse. He started experiencing acute, non-radiating mid-sternal chest "tightness, maybe ingestion" pain around 1h PTA today. States pain has been alleviating since onset. Denies worsening pain with deep inspiration. Also reports associated nausea. Attempted Tx with baby ASA. Denies cough, dyspnea, abdominal pain, or other associated symptoms.      Reports CP relieved after NTG, denies SOB  EKG sinus bradycardia 53 otherwise ok  Troponin negative x 3      Had echo and stress test 5/2022 in [place] - results not viewable in care everywhere. Patient reports they were ok    Previously saw Dr Michael  # AAA s/p endovasc repair 12/2016 ([facility])  # HTN, controlled  # CAD, hx of distant PCI, MPI 1/2017 normal  # HLP, on atorva 40mg  # tob abuse, pt again strongly encouraged to quit.  He has previously been enrolled in the smoking class.    CTA abd/pel 11/20/17  Interval placement of aortobiiliac endovascular stent graft which is patent with no extravasation and with partial collapse of the native surrounding aneurysm sac.  Additional findings as detailed above including enlarged prostate gland, diverticulosis without CT findings of acute diverticulitis, left renal masses suggesting low density and high density cysts.     Ex MPI 1/10/17  The patient exercised for 5.5 minutes on a High Ramp protocol, corresponding to a functional capacity of 7 estimated METS, achieving a peak heart rate of 151 bpm, which is 97% of the age predicted maximum heart rate. -CP/EKG.  Nuclear Quantitative Functional Analysis:   LVEF: 49 %  Impression: NORMAL MYOCARDIAL PERFUSION  1. The perfusion scan is free of evidence for myocardial ischemia or injury.   2. There is a mild intensity fixed defect in the inferior wall of the left " ventricle, secondary to diaphragm attenuation.   3. Resting wall motion is physiologic.   4. There is resting LV dysfunction with a reduced ejection fraction of 49 %.   5. The ventricular volumes are normal at rest and stress.   6. The extracardiac distribution of radioactivity is normal.   7. When compared to the previous study from 03/11/2014, no change     LE art US 1/9/17  1.  With regards to the right lower extremity there is no evidence of hemodynamically significant peripheral vascular disease. LASHAUN 1.07  2.  With regards to the left lower extremity there is no evidence of hemodynamically significant peripheral vascular disease. LASHAUN 1.08     Stress Test: L MPI 3/11/14  Nuclear Quantitative Functional Analysis:   LVEF: >= 70 %  Impression: NORMAL MYOCARDIAL PERFUSION  1. The perfusion scan is free of evidence for myocardial ischemia or injury.   2. There is a moderate intensity fixed defect in the inferior wall of the left ventricle, secondary to diaphragm attenuation.   3. Resting wall motion is physiologic.   4. Resting LV function is normal.   5. The ventricular volumes are normal at rest and stress.   6. The extracardiac distribution of radioactivity is normal.

## 2023-02-21 NOTE — HOSPITAL COURSE
Admission for chest pain.  Patient with history of CAD status post MI stent prior to Lily.  ACS ruled out.  D-dimer positive both lower extremity ultrasound no DVT and CT no PE.  Cardiology offered left heart catheterization however patient would like outpatient ischemic workup.  Continue aspirin statin.  Continue to hold metoprolol secondary to bradycardia.  Patient hemodynamically stable for discharge home.   All findings and plan were explained to the patient. All questions and concerns were answered. Patient verbalized understanding. Patient is in stable condition to d/c home and has been informed to follow up with PCP and Cardiologist.

## 2023-02-21 NOTE — ASSESSMENT & PLAN NOTE
Patient with history of CAD status post MI/stent prior to Lily who was brought by his wife to the ED for acute nonradiating midsternal chest tightness that occur at 2:00 a.m. after letting his dog out.  Symptoms associated nausea vomiting and diaphoresis.  EKG sinus Arik and troponin trend x3 negative  Symptoms resolved after aspirin and nitro in ED  Patient with recent travel from [place] to [place] 2 weeks ago 18 hour drive  Positive D-dimer we will get ultrasound both lower extremity and CT  Cardiology following offered left heart catheterization however patient would like outpatient ischemic workup  If CT negative for PE then patient will be discharged home  Continue aspirin statin will hold metoprolol given history of bradycardia

## 2023-02-21 NOTE — ASSESSMENT & PLAN NOTE
CP improved with NTG. EKG nonischemic. Troponin negative x 3. Discussed repeat stress test or LHC - he would like to go home and schedule ischemic evaluation as an out patient. Check today - ok for d/c if stable

## 2023-04-04 ENCOUNTER — OFFICE VISIT (OUTPATIENT)
Dept: CARDIOLOGY | Facility: CLINIC | Age: 73
End: 2023-04-04
Payer: MEDICARE

## 2023-04-04 VITALS
BODY MASS INDEX: 23.07 KG/M2 | WEIGHT: 161.19 LBS | HEART RATE: 66 BPM | OXYGEN SATURATION: 97 % | SYSTOLIC BLOOD PRESSURE: 138 MMHG | RESPIRATION RATE: 18 BRPM | DIASTOLIC BLOOD PRESSURE: 72 MMHG | HEIGHT: 70 IN

## 2023-04-04 DIAGNOSIS — Z87.898 HISTORY OF BRADYCARDIA: ICD-10-CM

## 2023-04-04 DIAGNOSIS — R07.9 CHEST PAIN, CARDIAC: ICD-10-CM

## 2023-04-04 DIAGNOSIS — E78.2 MIXED HYPERLIPIDEMIA: ICD-10-CM

## 2023-04-04 DIAGNOSIS — E78.1 PURE HYPERGLYCERIDEMIA: ICD-10-CM

## 2023-04-04 DIAGNOSIS — I25.10 CORONARY ARTERY DISEASE INVOLVING NATIVE CORONARY ARTERY OF NATIVE HEART WITHOUT ANGINA PECTORIS: ICD-10-CM

## 2023-04-04 DIAGNOSIS — I10 HYPERTENSION, ESSENTIAL: Primary | ICD-10-CM

## 2023-04-04 PROCEDURE — 3008F BODY MASS INDEX DOCD: CPT | Mod: CPTII,S$GLB,, | Performed by: INTERNAL MEDICINE

## 2023-04-04 PROCEDURE — 1159F PR MEDICATION LIST DOCUMENTED IN MEDICAL RECORD: ICD-10-PCS | Mod: CPTII,S$GLB,, | Performed by: INTERNAL MEDICINE

## 2023-04-04 PROCEDURE — 3075F PR MOST RECENT SYSTOLIC BLOOD PRESS GE 130-139MM HG: ICD-10-PCS | Mod: CPTII,S$GLB,, | Performed by: INTERNAL MEDICINE

## 2023-04-04 PROCEDURE — 3078F DIAST BP <80 MM HG: CPT | Mod: CPTII,S$GLB,, | Performed by: INTERNAL MEDICINE

## 2023-04-04 PROCEDURE — 3288F PR FALLS RISK ASSESSMENT DOCUMENTED: ICD-10-PCS | Mod: CPTII,S$GLB,, | Performed by: INTERNAL MEDICINE

## 2023-04-04 PROCEDURE — 1101F PR PT FALLS ASSESS DOC 0-1 FALLS W/OUT INJ PAST YR: ICD-10-PCS | Mod: CPTII,S$GLB,, | Performed by: INTERNAL MEDICINE

## 2023-04-04 PROCEDURE — 1101F PT FALLS ASSESS-DOCD LE1/YR: CPT | Mod: CPTII,S$GLB,, | Performed by: INTERNAL MEDICINE

## 2023-04-04 PROCEDURE — 3288F FALL RISK ASSESSMENT DOCD: CPT | Mod: CPTII,S$GLB,, | Performed by: INTERNAL MEDICINE

## 2023-04-04 PROCEDURE — 3008F PR BODY MASS INDEX (BMI) DOCUMENTED: ICD-10-PCS | Mod: CPTII,S$GLB,, | Performed by: INTERNAL MEDICINE

## 2023-04-04 PROCEDURE — 99999 PR PBB SHADOW E&M-EST. PATIENT-LVL III: ICD-10-PCS | Mod: PBBFAC,,, | Performed by: INTERNAL MEDICINE

## 2023-04-04 PROCEDURE — 1126F AMNT PAIN NOTED NONE PRSNT: CPT | Mod: CPTII,S$GLB,, | Performed by: INTERNAL MEDICINE

## 2023-04-04 PROCEDURE — 1159F MED LIST DOCD IN RCRD: CPT | Mod: CPTII,S$GLB,, | Performed by: INTERNAL MEDICINE

## 2023-04-04 PROCEDURE — 3078F PR MOST RECENT DIASTOLIC BLOOD PRESSURE < 80 MM HG: ICD-10-PCS | Mod: CPTII,S$GLB,, | Performed by: INTERNAL MEDICINE

## 2023-04-04 PROCEDURE — 1126F PR PAIN SEVERITY QUANTIFIED, NO PAIN PRESENT: ICD-10-PCS | Mod: CPTII,S$GLB,, | Performed by: INTERNAL MEDICINE

## 2023-04-04 PROCEDURE — 3075F SYST BP GE 130 - 139MM HG: CPT | Mod: CPTII,S$GLB,, | Performed by: INTERNAL MEDICINE

## 2023-04-04 PROCEDURE — 99999 PR PBB SHADOW E&M-EST. PATIENT-LVL III: CPT | Mod: PBBFAC,,, | Performed by: INTERNAL MEDICINE

## 2023-04-04 PROCEDURE — 99214 OFFICE O/P EST MOD 30 MIN: CPT | Mod: S$GLB,,, | Performed by: INTERNAL MEDICINE

## 2023-04-04 PROCEDURE — 99214 PR OFFICE/OUTPT VISIT, EST, LEVL IV, 30-39 MIN: ICD-10-PCS | Mod: S$GLB,,, | Performed by: INTERNAL MEDICINE

## 2023-04-04 RX ORDER — ATORVASTATIN CALCIUM 40 MG/1
40 TABLET, FILM COATED ORAL NIGHTLY
Qty: 90 TABLET | Refills: 3 | Status: SHIPPED | OUTPATIENT
Start: 2023-04-04

## 2023-04-04 RX ORDER — IBUPROFEN 200 MG
1 TABLET ORAL DAILY
Qty: 30 PATCH | Refills: 11 | OUTPATIENT
Start: 2023-04-04 | End: 2023-04-28

## 2023-04-04 NOTE — PROGRESS NOTES
Subjective:   Patient ID:  Antolin Richardson is a 72 y.o. male who presents for follow-up of No chief complaint on file.      HPI    CAD - PCI 2005, AAA - s/p repir 2016, HTN, HLD    Admitted 2/21/23  Antolin Galeana is a 72-year-old male with significant history of CAD status post MI with stent prior to 2005, peripheral artery disease status, AAA status post into repair,  current 1/2 a pack a day smoker, hypertension and hyperlipidemia who presents to the hospital for acute nonradiating midsternal chest tightness that occurred at 2 a.m. after he let his dogs out.  Symptoms associated with nausea vomiting and diaphoresis.  Patient took home aspirin and wife drove patient to ED. patient moved back to [place] 2 weeks ago from [place].  Patient drove 18 hours here.  Denies both lower extremity edema or pain.   In ED EKG sinus Arik.  Chest tightness resolved after nitro and aspirin.  Troponin trend times -3 however D-dimer positive.     Admission for chest pain.  Patient with history of CAD status post MI stent prior to Lily.  ACS ruled out.  D-dimer positive both lower extremity ultrasound no DVT and CT no PE.  Cardiology offered left heart catheterization however patient would like outpatient ischemic workup.  Continue aspirin statin.  Continue to hold metoprolol secondary to bradycardia.  Patient hemodynamically stable for discharge home.   All findings and plan were explained to the patient. All questions and concerns were answered. Patient verbalized understanding. Patient is in stable condition to d/c home and has been informed to follow up with PCP and Cardiologist.    Reports CP relieved after NTG, denies SOB  EKG sinus bradycardia 53 otherwise ok  Troponin negative x 3       Had echo and stress test 5/2022 in [place] - results not viewable in care everywhere. Patient reports they were ok     Previously saw Dr Michael  # AAA s/p endovasc repair 12/2016 ([facility])  # HTN,  controlled  # CAD, hx of distant PCI, MPI 1/2017 normal  # HLP, on atorva 40mg  # tob abuse, pt again strongly encouraged to quit.  He has previously been enrolled in the smoking class.    Echo 2/21/23  The left ventricle is normal in size with concentric hypertrophy and normal systolic function.  The estimated ejection fraction is 70%.  Indeterminate left ventricular diastolic function.  Normal right ventricular size with normal right ventricular systolic function.  Mild left atrial enlargement.  Normal central venous pressure (3 mmHg).  The estimated PA systolic pressure is 12 mmHg.  The ascending aorta is mildly dilated. 3.8 cm     CTA abd/pel 11/20/17  Interval placement of aortobiiliac endovascular stent graft which is patent with no extravasation and with partial collapse of the native surrounding aneurysm sac.  Additional findings as detailed above including enlarged prostate gland, diverticulosis without CT findings of acute diverticulitis, left renal masses suggesting low density and high density cysts.     Ex MPI 1/10/17  The patient exercised for 5.5 minutes on a High Ramp protocol, corresponding to a functional capacity of 7 estimated METS, achieving a peak heart rate of 151 bpm, which is 97% of the age predicted maximum heart rate. -CP/EKG.  Nuclear Quantitative Functional Analysis:   LVEF: 49 %  Impression: NORMAL MYOCARDIAL PERFUSION  1. The perfusion scan is free of evidence for myocardial ischemia or injury.   2. There is a mild intensity fixed defect in the inferior wall of the left ventricle, secondary to diaphragm attenuation.   3. Resting wall motion is physiologic.   4. There is resting LV dysfunction with a reduced ejection fraction of 49 %.   5. The ventricular volumes are normal at rest and stress.   6. The extracardiac distribution of radioactivity is normal.   7. When compared to the previous study from 03/11/2014, no change     LE art US 1/9/17  1.  With regards to the right lower  extremity there is no evidence of hemodynamically significant peripheral vascular disease. LASHAUN 1.07  2.  With regards to the left lower extremity there is no evidence of hemodynamically significant peripheral vascular disease. LASHAUN 1.08     Stress Test: L MPI 3/11/14  Nuclear Quantitative Functional Analysis:   LVEF: >= 70 %  Impression: NORMAL MYOCARDIAL PERFUSION  1. The perfusion scan is free of evidence for myocardial ischemia or injury.   2. There is a moderate intensity fixed defect in the inferior wall of the left ventricle, secondary to diaphragm attenuation.   3. Resting wall motion is physiologic.   4. Resting LV function is normal.   5. The ventricular volumes are normal at rest and stress.   6. The extracardiac distribution of radioactivity is normal.      4/4/23 Denies CP or SOB since discharge. Requesting nicotine patch  BP controlled    Review of Systems   Constitutional: Negative for decreased appetite.   HENT:  Negative for ear discharge.    Eyes:  Negative for blurred vision.   Endocrine: Negative for polyphagia.   Skin:  Negative for nail changes.   Genitourinary:  Negative for bladder incontinence.   Neurological:  Negative for aphonia.   Psychiatric/Behavioral:  Negative for hallucinations.    Allergic/Immunologic: Negative for hives.     Objective:   Physical Exam  Constitutional:       Appearance: He is well-developed.   HENT:      Head: Normocephalic and atraumatic.   Eyes:      Conjunctiva/sclera: Conjunctivae normal.      Pupils: Pupils are equal, round, and reactive to light.   Cardiovascular:      Rate and Rhythm: Normal rate.      Pulses: Intact distal pulses.      Heart sounds: Normal heart sounds.   Pulmonary:      Effort: Pulmonary effort is normal.      Breath sounds: Normal breath sounds.   Abdominal:      General: Bowel sounds are normal.      Palpations: Abdomen is soft.   Musculoskeletal:         General: Normal range of motion.      Cervical back: Normal range of motion and neck  supple.   Skin:     General: Skin is warm and dry.   Neurological:      Mental Status: He is alert and oriented to person, place, and time.       Assessment:      1. Hypertension, essential    2. Coronary artery disease involving native coronary artery of native heart without angina pectoris    3. Mixed hyperlipidemia    4. History of bradycardia    5. Chest pain, cardiac        Plan:     Lexiscan myoview for  recent CP  Continue Rx for HTN, HLD, CAD, AAA

## 2023-04-26 ENCOUNTER — HOSPITAL ENCOUNTER (OUTPATIENT)
Dept: CARDIOLOGY | Facility: HOSPITAL | Age: 73
Discharge: HOME | End: 2023-04-26
Attending: INTERNAL MEDICINE
Payer: MEDICARE

## 2023-04-26 ENCOUNTER — HOSPITAL ENCOUNTER (OUTPATIENT)
Dept: RADIOLOGY | Facility: HOSPITAL | Age: 73
Discharge: HOME | End: 2023-04-26
Attending: INTERNAL MEDICINE
Payer: MEDICARE

## 2023-04-26 DIAGNOSIS — E78.2 MIXED HYPERLIPIDEMIA: ICD-10-CM

## 2023-04-26 DIAGNOSIS — R07.9 CHEST PAIN, CARDIAC: ICD-10-CM

## 2023-04-26 DIAGNOSIS — I10 HYPERTENSION, ESSENTIAL: ICD-10-CM

## 2023-04-26 DIAGNOSIS — I25.10 CORONARY ARTERY DISEASE INVOLVING NATIVE CORONARY ARTERY OF NATIVE HEART WITHOUT ANGINA PECTORIS: ICD-10-CM

## 2023-04-26 DIAGNOSIS — E78.1 PURE HYPERGLYCERIDEMIA: ICD-10-CM

## 2023-04-26 DIAGNOSIS — Z87.898 HISTORY OF BRADYCARDIA: ICD-10-CM

## 2023-04-26 LAB
CV STRESS BASE HR: 64 BPM
DIASTOLIC BLOOD PRESSURE: 79 MMHG
NUC STRESS DIASTOLIC VOLUME INDEX: 109
NUC STRESS EJECTION FRACTION: 67 %
NUC STRESS SYSTOLIC VOLUME INDEX: 37
OHS CV CPX 85 PERCENT MAX PREDICTED HEART RATE MALE: 128
OHS CV CPX MAX PREDICTED HEART RATE: 150
OHS CV CPX PATIENT IS FEMALE: 0
OHS CV CPX PATIENT IS MALE: 1
OHS CV CPX PEAK DIASTOLIC BLOOD PRESSURE: 68 MMHG
OHS CV CPX PEAK HEAR RATE: 107 BPM
OHS CV CPX PEAK RATE PRESSURE PRODUCT: NORMAL
OHS CV CPX PEAK SYSTOLIC BLOOD PRESSURE: 142 MMHG
OHS CV CPX PERCENT MAX PREDICTED HEART RATE ACHIEVED: 71
OHS CV CPX RATE PRESSURE PRODUCT PRESENTING: 8960
SYSTOLIC BLOOD PRESSURE: 140 MMHG

## 2023-04-26 PROCEDURE — A9502 TC99M TETROFOSMIN: HCPCS

## 2023-04-26 PROCEDURE — 78452 NUCLEAR STRESS - CARDIOLOGY INTERPRETED (CUPID ONLY): ICD-10-PCS | Mod: 26,,, | Performed by: INTERNAL MEDICINE

## 2023-04-26 PROCEDURE — 78452 HT MUSCLE IMAGE SPECT MULT: CPT | Mod: 26,,, | Performed by: INTERNAL MEDICINE

## 2023-04-26 PROCEDURE — 78452 HT MUSCLE IMAGE SPECT MULT: CPT

## 2023-04-26 PROCEDURE — 93016 CV STRESS TEST SUPVJ ONLY: CPT | Mod: ,,, | Performed by: INTERNAL MEDICINE

## 2023-04-26 PROCEDURE — 93018 CV STRESS TEST I&R ONLY: CPT | Mod: ,,, | Performed by: INTERNAL MEDICINE

## 2023-04-26 PROCEDURE — 63600175 PHARM REV CODE 636 W HCPCS: Performed by: INTERNAL MEDICINE

## 2023-04-26 PROCEDURE — 93016 NUCLEAR STRESS - CARDIOLOGY INTERPRETED (CUPID ONLY): ICD-10-PCS | Mod: ,,, | Performed by: INTERNAL MEDICINE

## 2023-04-26 PROCEDURE — 93017 CV STRESS TEST TRACING ONLY: CPT

## 2023-04-26 PROCEDURE — 93018 NUCLEAR STRESS - CARDIOLOGY INTERPRETED (CUPID ONLY): ICD-10-PCS | Mod: ,,, | Performed by: INTERNAL MEDICINE

## 2023-04-26 RX ORDER — REGADENOSON 0.08 MG/ML
0.4 INJECTION, SOLUTION INTRAVENOUS ONCE
Status: COMPLETED | OUTPATIENT
Start: 2023-04-26 | End: 2023-04-26

## 2023-04-26 RX ADMIN — REGADENOSON 0.4 MG: 0.08 INJECTION, SOLUTION INTRAVENOUS at 09:04

## 2023-04-27 ENCOUNTER — NURSE TRIAGE (OUTPATIENT)
Dept: ADMINISTRATIVE | Facility: CLINIC | Age: 73
End: 2023-04-27
Payer: MEDICARE

## 2023-04-27 ENCOUNTER — OFFICE VISIT (OUTPATIENT)
Dept: URGENT CARE | Facility: CLINIC | Age: 73
End: 2023-04-27
Payer: MEDICARE

## 2023-04-27 VITALS
HEIGHT: 71 IN | BODY MASS INDEX: 22.54 KG/M2 | DIASTOLIC BLOOD PRESSURE: 71 MMHG | WEIGHT: 161 LBS | TEMPERATURE: 98 F | OXYGEN SATURATION: 95 % | SYSTOLIC BLOOD PRESSURE: 134 MMHG | HEART RATE: 85 BPM | RESPIRATION RATE: 16 BRPM

## 2023-04-27 DIAGNOSIS — J01.90 ACUTE VIRAL SINUSITIS: Primary | ICD-10-CM

## 2023-04-27 DIAGNOSIS — R68.83 CHILLS: ICD-10-CM

## 2023-04-27 DIAGNOSIS — B97.89 ACUTE VIRAL SINUSITIS: Primary | ICD-10-CM

## 2023-04-27 DIAGNOSIS — B34.9 ACUTE VIRAL SYNDROME: ICD-10-CM

## 2023-04-27 LAB
CTP QC/QA: YES
CTP QC/QA: YES
POC MOLECULAR INFLUENZA A AGN: NEGATIVE
POC MOLECULAR INFLUENZA B AGN: NEGATIVE
SARS-COV-2 AG RESP QL IA.RAPID: NEGATIVE

## 2023-04-27 PROCEDURE — 87811 SARS CORONAVIRUS 2 ANTIGEN POCT, MANUAL READ: ICD-10-PCS | Mod: QW,S$GLB,,

## 2023-04-27 PROCEDURE — 99213 OFFICE O/P EST LOW 20 MIN: CPT | Mod: S$GLB,,,

## 2023-04-27 PROCEDURE — 87502 POCT INFLUENZA A/B MOLECULAR: ICD-10-PCS | Mod: QW,S$GLB,,

## 2023-04-27 PROCEDURE — 87811 SARS-COV-2 COVID19 W/OPTIC: CPT | Mod: QW,S$GLB,,

## 2023-04-27 PROCEDURE — 99213 PR OFFICE/OUTPT VISIT, EST, LEVL III, 20-29 MIN: ICD-10-PCS | Mod: S$GLB,,,

## 2023-04-27 PROCEDURE — 87502 INFLUENZA DNA AMP PROBE: CPT | Mod: QW,S$GLB,,

## 2023-04-27 NOTE — PATIENT INSTRUCTIONS
You are negative for flu and COVID today. It may be too soon to test. Your symptoms are likely from a viral illness at this time.     When sick with a viral illness, cover mouth and nose when sneezing and coughing. Wash hands frequently. Sanitize areas at home. Wear a mask in public if you need. Stay home if you are having fevers, chills, body aches, fatigue. Symptoms should resolve in 7-14 days. There is no specific treatment for viral illnesses. We treat symptoms with supportive care. Getting plenty of rest but completing light activities daily, eating a well-balanced diet, drinking plenty of fluids, managing stress levels can aid in faster recovery.      Avoid or lessen your usage of cigarettes at this time as this can worsen you symptoms.     Try a decongestant and corticosteroid nasal spray like flonase for the next few days for sinus relief. Initial: 2 sprays in each nostril once daily for 1 week. Reduce to 1 spray in each nostril once per day. An antihistamine like zyrtec, claritin, allegra can also be helpful for sinus relief. Neti pot irrigation, humidifier in your room, saline nasal spray and warm compresses to face can help. Regular (Guaifenesin) Mucinex 600 mg twice per day for 10 days can help thin secretions for better clearance. Drink plenty of fluids with this. Honey is a natural cough suppressant.     Please only use over the counter cough and cold medications for 3-5 days at a time to avoid rebound symptoms.     Getting plenty of rest can aid in a faster recovery of illnesses.     Avoid OTC cough and cold medications that can raise your BP such as dextromethorphan, pseudoephedrine, phenylephrine, naphazoline and oxymetazoline. Try DASH diet and buy a blood pressure cuff for home monitoring. Check blood pressure at least 2 times per day and create a log. Avoid eating foods that are high in salt.     Alternate Tylenol and ibuprofen every 4 hours as needed for fever and body aches.  Please take NSAIDs  with a full glass of water and food to avoid GI upset.  Get plenty of rest.  Drink at least 3 L of water per day to clear/thin secretions.      Please follow-up with your primary care provider or return to the clinic if not better/worsening symptoms in 1 week.         Diarrhea  Try a bland diet for the next few days. I included information on this in your discharge paperwork. Avoid acidic/spicy/processed foods. You can take over the counter pepto or imodium for symptoms but please understand that this may cause constipation. Drink plenty of fluids.      Follow up with PCP if diarrhea persists past 7 days.     Report to the ER if you have chest pain, shortness of breath, palpitations, unable to tolerate fluids, severe abdominal pain with high fevers.

## 2023-04-27 NOTE — TELEPHONE ENCOUNTER
Reason for Disposition   [1] SEVERE pain (e.g., excruciating, unable to walk) AND [2] not improved after 2 hours of pain medicine    Additional Information   Negative: Followed an ankle injury   Negative: Foot pain is main symptom   Negative: Thigh or calf pain is main symptom   Negative: Ankle swelling is main symptom   Negative: Thigh or calf swelling is main symptom   Negative: [1] Red area or streak AND [2] fever   Negative: Patient sounds very sick or weak to the triager   Negative: Entire foot is cool or blue in comparison to other side   Negative: [1] Swollen joint AND [2] fever    Protocols used: Ankle Pain-A-AH  Pt's spouse states he had a nuclear test with Dr. Bautista on yesterday. States he had chills and fever of 101 on yesterday when he arrived home. She gave Tylenol and his temp decreased to 98.9.    States pt has severe pain in both of his ankles with swelling. OTC pain medication does not help. Denies chest pain or difficulty breathing. Advised per Triage Care Advice to see a Healthcare Provider within 4 hrs. No Sameday appointments available. Advised to bring pt to Urgent Care for evaluation. She verbalized understanding.

## 2023-04-27 NOTE — PROGRESS NOTES
"Subjective:      Patient ID: Antolin Richardson is a 72 y.o. male.    Vitals:  height is 5' 11" (1.803 m) and weight is 73 kg (161 lb). His oral temperature is 98.4 °F (36.9 °C). His blood pressure is 134/71 and his pulse is 85. His respiration is 16 and oxygen saturation is 95%.     Chief Complaint: Chills    Pt C. C of chills, diarrhea, body aches( legs/ankles), headaches. He reports nausea yesterday but resolved. Symptoms started 1 day ago. Ibuprofen was taken for treatment. Denies eating contaminated foods prior to symptoms. No cough, sore throat, earaches, abd pain, emesis, CP, SOB.     Constitution: Positive for chills. Negative for fatigue and fever.   HENT:  Negative for ear pain, congestion, postnasal drip, sinus pain, sinus pressure and sore throat.    Cardiovascular:  Negative for chest pain.   Eyes:  Negative for eye discharge, double vision and blurred vision.   Respiratory:  Negative for cough and shortness of breath.    Gastrointestinal:  Positive for nausea and diarrhea. Negative for abdominal pain and constipation.   Musculoskeletal:  Positive for muscle ache.    Objective:     Physical Exam   Constitutional: He is oriented to person, place, and time. He appears well-developed. He is cooperative.  Non-toxic appearance. He does not appear ill. No distress.   HENT:   Head: Normocephalic and atraumatic.   Ears:   Right Ear: Hearing, tympanic membrane, external ear and ear canal normal.   Left Ear: Hearing, tympanic membrane, external ear and ear canal normal.   Nose: No mucosal edema, rhinorrhea, nasal deformity or congestion. No epistaxis. Right sinus exhibits frontal sinus tenderness. Right sinus exhibits no maxillary sinus tenderness. Left sinus exhibits frontal sinus tenderness. Left sinus exhibits no maxillary sinus tenderness.   Mouth/Throat: Uvula is midline and mucous membranes are normal. No trismus in the jaw. Normal dentition. No uvula swelling. Posterior oropharyngeal erythema present. No " oropharyngeal exudate or posterior oropharyngeal edema. No tonsillar exudate.   Eyes: Conjunctivae and lids are normal. Pupils are equal, round, and reactive to light. No scleral icterus.   Neck: Trachea normal and phonation normal. Neck supple. No edema present. No erythema present. No neck rigidity present.   Cardiovascular: Normal rate, regular rhythm, normal heart sounds and normal pulses.   Pulmonary/Chest: Effort normal and breath sounds normal. No stridor. No respiratory distress. He has no decreased breath sounds. He has no wheezes. He has no rhonchi. He has no rales.   Abdominal: Normal appearance. He exhibits no distension. Soft. There is no abdominal tenderness. There is no rebound and no guarding.   Musculoskeletal: Normal range of motion.         General: No deformity. Normal range of motion.   Lymphadenopathy:        Head (right side): No preauricular and no posterior auricular adenopathy present.        Head (left side): No preauricular and no posterior auricular adenopathy present.     He has no cervical adenopathy.   Neurological: He is alert and oriented to person, place, and time. He exhibits normal muscle tone. Coordination normal.   Skin: Skin is warm, dry, intact, not diaphoretic and not pale. Capillary refill takes less than 2 seconds.   Psychiatric: His speech is normal and behavior is normal. Judgment and thought content normal.   Nursing note and vitals reviewed.      Results for orders placed or performed in visit on 04/27/23   SARS Coronavirus 2 Antigen, POCT Manual Read   Result Value Ref Range    SARS Coronavirus 2 Antigen Negative Negative     Acceptable Yes    POCT Influenza A/B MOLECULAR   Result Value Ref Range    POC Molecular Influenza A Ag Negative Negative, Not Reported    POC Molecular Influenza B Ag Negative Negative, Not Reported     Acceptable Yes        Assessment:     1. Acute viral sinusitis    2. Acute viral syndrome    3. Chills         Plan:       Acute viral sinusitis    Acute viral syndrome    Chills  -     SARS Coronavirus 2 Antigen, POCT Manual Read  -     POCT Influenza A/B MOLECULAR            Discussed results/diagnosis/plan with patient in clinic. Strict precautions given to patient to monitor for worsening signs and symptoms. Advised to follow up with PCP or specialist.  Explained side effects of medications prescribed with patient and informed him/her to discontinue use if he/she has any side effects and to inform UC or PCP if this occurs. All questions answered. Strict ED verses clinic return precautions stressed and given in depth. Advised if symptoms worsens of fail to improve he/she should go to the Emergency Room. Discharge and follow-up instructions given verbally/printed with the patient who expressed understanding and willingness to comply with my recommendations. Patient voiced understanding and in agreement with current treatment plan. Patient exits the exam room in no acute distress. Conversant and engaged during discharge discussion, verbalized understanding.

## 2023-04-28 ENCOUNTER — HOSPITAL ENCOUNTER (EMERGENCY)
Facility: HOSPITAL | Age: 73
Discharge: HOME | End: 2023-04-28
Attending: EMERGENCY MEDICINE
Payer: MEDICARE

## 2023-04-28 VITALS
BODY MASS INDEX: 22.54 KG/M2 | HEIGHT: 71 IN | HEART RATE: 79 BPM | DIASTOLIC BLOOD PRESSURE: 59 MMHG | OXYGEN SATURATION: 98 % | SYSTOLIC BLOOD PRESSURE: 104 MMHG | RESPIRATION RATE: 16 BRPM | TEMPERATURE: 98 F | WEIGHT: 161 LBS

## 2023-04-28 DIAGNOSIS — Z87.09 HISTORY OF SINUSITIS: Primary | ICD-10-CM

## 2023-04-28 DIAGNOSIS — B34.9 VIRAL SYNDROME: ICD-10-CM

## 2023-04-28 DIAGNOSIS — R50.9 FEVER: ICD-10-CM

## 2023-04-28 LAB
BACTERIA #/AREA URNS HPF: ABNORMAL /HPF
BILIRUB UR QL STRIP: NEGATIVE
CLARITY UR: CLEAR
COLOR UR: YELLOW
CTP QC/QA: YES
CTP QC/QA: YES
GLUCOSE UR QL STRIP: NEGATIVE
HGB UR QL STRIP: ABNORMAL
HYALINE CASTS #/AREA URNS LPF: 4 /LPF
KETONES UR QL STRIP: NEGATIVE
LEUKOCYTE ESTERASE UR QL STRIP: NEGATIVE
MICROSCOPIC COMMENT: ABNORMAL
NITRITE UR QL STRIP: NEGATIVE
PH UR STRIP: 6 [PH] (ref 5–8)
POC MOLECULAR INFLUENZA A AGN: NEGATIVE
POC MOLECULAR INFLUENZA B AGN: NEGATIVE
PROT UR QL STRIP: ABNORMAL
RBC #/AREA URNS HPF: 2 /HPF (ref 0–4)
SARS-COV-2 RDRP RESP QL NAA+PROBE: NEGATIVE
SP GR UR STRIP: >1.03 (ref 1–1.03)
URN SPEC COLLECT METH UR: ABNORMAL
UROBILINOGEN UR STRIP-ACNC: NEGATIVE EU/DL
WBC #/AREA URNS HPF: 2 /HPF (ref 0–5)

## 2023-04-28 PROCEDURE — 99284 EMERGENCY DEPT VISIT MOD MDM: CPT | Mod: 25

## 2023-04-28 PROCEDURE — 81000 URINALYSIS NONAUTO W/SCOPE: CPT | Performed by: NURSE PRACTITIONER

## 2023-04-28 PROCEDURE — 87502 INFLUENZA DNA AMP PROBE: CPT

## 2023-04-28 PROCEDURE — 25000003 PHARM REV CODE 250: Performed by: NURSE PRACTITIONER

## 2023-04-28 RX ORDER — ONDANSETRON 4 MG/1
4 TABLET, ORALLY DISINTEGRATING ORAL EVERY 8 HOURS PRN
Qty: 12 TABLET | Refills: 0 | Status: SHIPPED | OUTPATIENT
Start: 2023-04-28

## 2023-04-28 RX ORDER — ONDANSETRON 4 MG/1
4 TABLET, ORALLY DISINTEGRATING ORAL
Status: COMPLETED | OUTPATIENT
Start: 2023-04-28 | End: 2023-04-28

## 2023-04-28 RX ORDER — AMOXICILLIN AND CLAVULANATE POTASSIUM 875; 125 MG/1; MG/1
1 TABLET, FILM COATED ORAL 2 TIMES DAILY
Qty: 14 TABLET | Refills: 0 | Status: SHIPPED | OUTPATIENT
Start: 2023-04-28 | End: 2023-05-22 | Stop reason: ALTCHOICE

## 2023-04-28 RX ORDER — AMOXICILLIN AND CLAVULANATE POTASSIUM 875; 125 MG/1; MG/1
1 TABLET, FILM COATED ORAL
Status: COMPLETED | OUTPATIENT
Start: 2023-04-28 | End: 2023-04-28

## 2023-04-28 RX ADMIN — AMOXICILLIN AND CLAVULANATE POTASSIUM 1 TABLET: 875; 125 TABLET, FILM COATED ORAL at 10:04

## 2023-04-28 RX ADMIN — ONDANSETRON 4 MG: 4 TABLET, ORALLY DISINTEGRATING ORAL at 09:04

## 2023-04-28 NOTE — ED PROVIDER NOTES
"Encounter Date: 4/28/2023    SCRIBE #1 NOTE: I, Helga Estelle, am scribing for, and in the presence of,  Alton Angela DNP. I have scribed the following portions of the note - Other sections scribed: HPI, ROS, PE, MDM.     History     Chief Complaint   Patient presents with    Generalized Body Aches     Pt to ED with complaints of "pain all over." Reports had a stress test done on Wednesday, states ever since "had ankle pain back pain fever chills cough diarrhea." Reports went to UC yesterday and "they didn't do anything for him." Pt in NADN.      72 year old male presents to ER for generalized body aches that began 2 days ago. Patient has history of AAA, CAD, MI, and HTN. Patient reports he went to Urgent Care yesterday and states they "didn't do anything for him". Patient reports he had a stress test done Wednesday and has been feeling sick every since. Associated symptoms are fever (max 101.0), chills, diarrhea, headaches, nausea, and cough. Patient also complains of lower back pain. Patient reports taking Advil with relief form fever, but not pain. Patient denies rhinorrhea, ear pain, vomiting, dysuria, urinary urgency or frequency, abdominal pain, rash, or sore throat. Patient has allergy to lisinopril.     The history is provided by the patient. No  was used.   Review of patient's allergies indicates:   Allergen Reactions    Lisinopril Itching     Past Medical History:   Diagnosis Date    AAA (abdominal aortic aneurysm) 12/2016    s/p endo repair ([facility])    Acid reflux     Anxiety     Coronary artery disease     Coronary artery disease involving native coronary artery of native heart without angina pectoris 1/3/2017    Hypertension     Myocardial infarction      Past Surgical History:   Procedure Laterality Date    ABDOMINAL AORTIC ANEURYSM REPAIR      APPENDECTOMY      COLONOSCOPY N/A 10/25/2018    Procedure: COLONOSCOPY;  Surgeon: Jackie Linares MD;  Location: [facility] ENDO;  " Service: Endoscopy;  Laterality: N/A;    CORONARY ANGIOPLASTY  2009    coronary artery stent      x1    CYSTOSCOPY N/A 10/22/2018    Procedure: CYSTOSCOPY;  Surgeon: Poly Marcum MD;  Location: [facility] OR;  Service: Urology;  Laterality: N/A;    stent for abdominal aneurysm      TRANSURETHRAL RESECTION OF PROSTATE N/A 8/1/2018    Procedure: TRANSURETHRAL RESECTION OF PROSTATE (TURP);  Surgeon: Poly Marcum MD;  Location: [facility] OR;  Service: Urology;  Laterality: N/A;     Family History   Problem Relation Age of Onset    Cancer Mother         brain    Dementia Father         alzheimer's    Hypertension Father      Social History     Tobacco Use    Smoking status: Every Day     Packs/day: 2.00     Years: 20.00     Pack years: 40.00     Types: Cigarettes    Smokeless tobacco: Never   Substance Use Topics    Alcohol use: Yes     Comment: occasionally    Drug use: No     Review of Systems   Constitutional:  Positive for chills and fever (max 101.0). Negative for appetite change, diaphoresis and fatigue.   HENT:  Negative for congestion, ear discharge, ear pain, postnasal drip, rhinorrhea, sinus pressure, sneezing, sore throat and voice change.    Eyes:  Negative for discharge, itching and visual disturbance.   Respiratory:  Positive for cough. Negative for shortness of breath and wheezing.    Cardiovascular:  Negative for chest pain, palpitations and leg swelling.   Gastrointestinal:  Positive for diarrhea and nausea. Negative for abdominal pain and vomiting.   Endocrine: Negative for polydipsia, polyphagia and polyuria.   Genitourinary:  Negative for difficulty urinating, dysuria, frequency, hematuria, penile discharge, penile pain, penile swelling and urgency.   Musculoskeletal:  Positive for back pain (lower) and myalgias. Negative for arthralgias.   Skin:  Negative for rash and wound.   Neurological:  Positive for headaches. Negative for dizziness, seizures, syncope and weakness.   Hematological:   Negative for adenopathy. Does not bruise/bleed easily.   Psychiatric/Behavioral:  Negative for agitation and self-injury. The patient is not nervous/anxious.      Physical Exam     Initial Vitals [04/28/23 0812]   BP Pulse Resp Temp SpO2   (!) 104/59 79 16 97.5 °F (36.4 °C) 98 %      MAP       --         Physical Exam    Nursing note and vitals reviewed.  Constitutional: He appears well-developed and well-nourished. He is not diaphoretic. No distress.   HENT:   Head: Normocephalic and atraumatic.   Right Ear: External ear normal.   Left Ear: External ear normal.   Nose: Nose normal.   Eyes: Pupils are equal, round, and reactive to light. Right eye exhibits no discharge. Left eye exhibits no discharge. No scleral icterus.   Neck:   Normal range of motion.  Pulmonary/Chest: No respiratory distress.   Abdominal: He exhibits no distension.   Musculoskeletal:         General: Normal range of motion.      Cervical back: Normal range of motion.     Neurological: He is alert and oriented to person, place, and time.   Skin: Skin is dry. Capillary refill takes less than 2 seconds.       ED Course   Procedures  Labs Reviewed   URINALYSIS, REFLEX TO URINE CULTURE - Abnormal; Notable for the following components:       Result Value    Specific Gravity, UA >1.030 (*)     Protein, UA 2+ (*)     Occult Blood UA 1+ (*)     All other components within normal limits    Narrative:     Specimen Source->Urine   URINALYSIS MICROSCOPIC - Abnormal; Notable for the following components:    Hyaline Casts, UA 4 (*)     All other components within normal limits    Narrative:     Specimen Source->Urine   POCT INFLUENZA A/B MOLECULAR   SARS-COV-2 RDRP GENE          Imaging Results              X-Ray Chest PA And Lateral (Final result)  Result time 04/28/23 09:08:21      Final result by Ricardo Segundo DO (04/28/23 09:08:21)                   Impression:      No acute cardiopulmonary abnormality.      Electronically signed by: Ricardo  Ratna  Date:    04/28/2023  Time:    09:08               Narrative:    EXAMINATION:  XR CHEST PA AND LATERAL    CLINICAL HISTORY:  Fever, unspecified    TECHNIQUE:  PA and lateral views of the chest were performed.    COMPARISON:  02/21/2023.    FINDINGS:  The lungs are well expanded and clear. No focal opacities are seen. The pleural spaces are clear.    The cardiac silhouette is unremarkable.    The visualized osseous structures are intact.                                       Medications   ondansetron disintegrating tablet 4 mg (4 mg Oral Given 4/28/23 0905)   amoxicillin-clavulanate 875-125mg per tablet 1 tablet (1 tablet Oral Given 4/28/23 1010)     Medical Decision Making:   History:   Old Medical Records: I decided to obtain old medical records.  Initial Assessment:   72 y.o. male presents to the ER for generalized body aches that began 2 days ago after getting a stress test done. Patient has associated chills, fever, diarrhea, headaches, nausea, and back pain. Patient has history of AAA, CAD, MI, and HTN. Chest XR ordered. Urinalysis ordered. Patient has been given Zofran 4mg in the ER.    Differential Diagnosis:   Includes but is not limited to: sinusitis, COVID-19, viral illness, URI  Clinical Tests:   Lab Tests: Ordered and Reviewed  Radiological Study: Ordered and Reviewed        Scribe Attestation:   Scribe #1: I performed the above scribed service and the documentation accurately describes the services I performed. I attest to the accuracy of the note.      ED Course as of 04/28/23 1237   Fri Apr 28, 2023   0815 BP(!): 104/59 [VC]   0815 Temp: 97.5 °F (36.4 °C) [VC]   0815 Temp Source: Oral [VC]   0815 Pulse: 79 [VC]   0815 Resp: 16 [VC]   0815 SpO2: 98 % [VC]   0857 SARS-CoV-2 RNA, Amplification, Qual: Negative [VC]   0857 POC Molecular Influenza A Ag: Negative [VC]   0857 POC Molecular Influenza B Ag: Negative [VC]   0915 X-Ray Chest PA And Lateral  No acute cardiopulmonary abnormality. [VC]   0945  Urinalysis Microscopic(!)  Neg for uti. [VC]      ED Course User Index  [VC] Alton Angela DNP        Medical Decision Making  Following results of all diagnostics I was called to the room to discuss the patient with the patient's family member who was present.  She was concerned that he had not been eating or drinking.  He reported that this was likely due to his nausea which was well controlled with the Zofran in the emergency department.  I feel it will be safe to discharge him with Zofran home and he understands that he should push fluids and nutrition.  Antibiotics prescribed for patient greater than 65 years of age with fever and diagnosed sinusitis.  Discharged home in good condition to follow-up as directed.    Problems Addressed:  Fever: acute illness or injury     Details: Treat with Tylenol and ibuprofen over-the-counter  History of sinusitis: acute illness or injury with systemic symptoms     Details: Systemic symptoms included fever.  Patient prescribed Augmentin in the emergency department today.  Viral syndrome: acute illness or injury with systemic symptoms     Details: Systemic symptoms include nausea vomiting diarrhea and fever.  Symptomatic therapy as outlined on AVS.  No signs of dehydration noted.    Amount and/or Complexity of Data Reviewed  Radiology: ordered. Decision-making details documented in ED Course.    Risk  Prescription drug management.             Scribe attestation: Scribe attestation: I, Alton Angela DNP ACNP-BC FNP-C ENP-C,  personally performed the services described in this documentation. All medical record entries made by the scribe were at my direction and in my presence.  I have reviewed the chart and agree that the record reflects my personal performance and is accurate and complete.    Clinical Impression:   Final diagnoses:  [R50.9] Fever  [Z87.09] History of sinusitis (Primary)  [B34.9] Viral syndrome     Vital signs at the time of disposition were:  BP (!)  "104/59 (BP Location: Right arm, Patient Position: Sitting)   Pulse 79   Temp 97.5 °F (36.4 °C) (Oral)   Resp 16   Ht 5' 11" (1.803 m)   Wt 73 kg (161 lb)   SpO2 98%   BMI 22.45 kg/m²       See AVS for additional recommendations. Medications listed herein were prescribed after reviewing the patient's allergies, medication list, history, most recent laboratories as available.  Referrals below were provided after reviewing the patient's previous medical providers. He understands he  should return for any worsening or changes in condition.  Prior to discharge the patient was asked if he  had any additional concerns or complaints and he declined. The patient was given an opportunity to ask questions and all were answered to his satisfaction.    ED Disposition Condition    Discharge Stable          ED Prescriptions       Medication Sig Dispense Start Date End Date Auth. Provider    ondansetron (ZOFRAN-ODT) 4 MG TbDL Take 1 tablet (4 mg total) by mouth every 8 (eight) hours as needed (nausea/vomiting). 12 tablet 4/28/2023 -- Alton Angela DNP    amoxicillin-clavulanate 875-125mg (AUGMENTIN) 875-125 mg per tablet Take 1 tablet by mouth 2 (two) times daily. 14 tablet 4/28/2023 -- Alton Angela DNP          Follow-up Information       Follow up With Specialties Details Why Contact Info    Sayda Cheung MD Internal Medicine, Wound Care Schedule an appointment as soon as possible for a visit   [address] [place] [address]  [address]  [place] [address]  [phone]               Alton Angela DNP  04/28/23 1238    "

## 2023-04-28 NOTE — DISCHARGE INSTRUCTIONS
Alternate tylenol/ibuprofen every 3h as directed on packages.    Take antibiotics as ordered.   Zofran for nausea/vomiting.  High fiber diet to slow diarrhea.  Avoid imodium unless you feel dehydration may be occurring.  If so you may use imodium otc sparingly as directed on package.  Return to the Emergency Department for any worsening, change in condition, or any emergent concerns.   
Detail Level: Zone

## 2023-05-01 ENCOUNTER — OFFICE VISIT (OUTPATIENT)
Dept: CARDIOLOGY | Facility: CLINIC | Age: 73
End: 2023-05-01
Payer: MEDICARE

## 2023-05-01 VITALS
OXYGEN SATURATION: 99 % | SYSTOLIC BLOOD PRESSURE: 133 MMHG | DIASTOLIC BLOOD PRESSURE: 74 MMHG | HEIGHT: 71 IN | WEIGHT: 155 LBS | HEART RATE: 65 BPM | RESPIRATION RATE: 18 BRPM | BODY MASS INDEX: 21.7 KG/M2

## 2023-05-01 DIAGNOSIS — I25.10 CORONARY ARTERY DISEASE INVOLVING NATIVE CORONARY ARTERY OF NATIVE HEART WITHOUT ANGINA PECTORIS: Primary | ICD-10-CM

## 2023-05-01 DIAGNOSIS — Z87.898 HISTORY OF BRADYCARDIA: ICD-10-CM

## 2023-05-01 DIAGNOSIS — I10 HYPERTENSION, ESSENTIAL: ICD-10-CM

## 2023-05-01 DIAGNOSIS — E78.2 MIXED HYPERLIPIDEMIA: ICD-10-CM

## 2023-05-01 DIAGNOSIS — I71.43 INFRARENAL ABDOMINAL AORTIC ANEURYSM (AAA) WITHOUT RUPTURE: ICD-10-CM

## 2023-05-01 DIAGNOSIS — R07.9 CHEST PAIN, CARDIAC: ICD-10-CM

## 2023-05-01 PROCEDURE — 1159F MED LIST DOCD IN RCRD: CPT | Mod: CPTII,S$GLB,, | Performed by: INTERNAL MEDICINE

## 2023-05-01 PROCEDURE — 1125F AMNT PAIN NOTED PAIN PRSNT: CPT | Mod: CPTII,S$GLB,, | Performed by: INTERNAL MEDICINE

## 2023-05-01 PROCEDURE — 99999 PR PBB SHADOW E&M-EST. PATIENT-LVL III: ICD-10-PCS | Mod: PBBFAC,,, | Performed by: INTERNAL MEDICINE

## 2023-05-01 PROCEDURE — 3078F PR MOST RECENT DIASTOLIC BLOOD PRESSURE < 80 MM HG: ICD-10-PCS | Mod: CPTII,S$GLB,, | Performed by: INTERNAL MEDICINE

## 2023-05-01 PROCEDURE — 1101F PT FALLS ASSESS-DOCD LE1/YR: CPT | Mod: CPTII,S$GLB,, | Performed by: INTERNAL MEDICINE

## 2023-05-01 PROCEDURE — 3008F BODY MASS INDEX DOCD: CPT | Mod: CPTII,S$GLB,, | Performed by: INTERNAL MEDICINE

## 2023-05-01 PROCEDURE — 3008F PR BODY MASS INDEX (BMI) DOCUMENTED: ICD-10-PCS | Mod: CPTII,S$GLB,, | Performed by: INTERNAL MEDICINE

## 2023-05-01 PROCEDURE — 1125F PR PAIN SEVERITY QUANTIFIED, PAIN PRESENT: ICD-10-PCS | Mod: CPTII,S$GLB,, | Performed by: INTERNAL MEDICINE

## 2023-05-01 PROCEDURE — 3288F PR FALLS RISK ASSESSMENT DOCUMENTED: ICD-10-PCS | Mod: CPTII,S$GLB,, | Performed by: INTERNAL MEDICINE

## 2023-05-01 PROCEDURE — 3288F FALL RISK ASSESSMENT DOCD: CPT | Mod: CPTII,S$GLB,, | Performed by: INTERNAL MEDICINE

## 2023-05-01 PROCEDURE — 99214 OFFICE O/P EST MOD 30 MIN: CPT | Mod: S$GLB,,, | Performed by: INTERNAL MEDICINE

## 2023-05-01 PROCEDURE — 3078F DIAST BP <80 MM HG: CPT | Mod: CPTII,S$GLB,, | Performed by: INTERNAL MEDICINE

## 2023-05-01 PROCEDURE — 99999 PR PBB SHADOW E&M-EST. PATIENT-LVL III: CPT | Mod: PBBFAC,,, | Performed by: INTERNAL MEDICINE

## 2023-05-01 PROCEDURE — 3075F SYST BP GE 130 - 139MM HG: CPT | Mod: CPTII,S$GLB,, | Performed by: INTERNAL MEDICINE

## 2023-05-01 PROCEDURE — 1159F PR MEDICATION LIST DOCUMENTED IN MEDICAL RECORD: ICD-10-PCS | Mod: CPTII,S$GLB,, | Performed by: INTERNAL MEDICINE

## 2023-05-01 PROCEDURE — 99214 PR OFFICE/OUTPT VISIT, EST, LEVL IV, 30-39 MIN: ICD-10-PCS | Mod: S$GLB,,, | Performed by: INTERNAL MEDICINE

## 2023-05-01 PROCEDURE — 3075F PR MOST RECENT SYSTOLIC BLOOD PRESS GE 130-139MM HG: ICD-10-PCS | Mod: CPTII,S$GLB,, | Performed by: INTERNAL MEDICINE

## 2023-05-01 PROCEDURE — 1101F PR PT FALLS ASSESS DOC 0-1 FALLS W/OUT INJ PAST YR: ICD-10-PCS | Mod: CPTII,S$GLB,, | Performed by: INTERNAL MEDICINE

## 2023-05-01 NOTE — PROGRESS NOTES
Subjective:   Patient ID:  Antolin Richardson is a 72 y.o. male who presents for follow-up of Results      HPI    CAD - PCI 2005, AAA - s/p repir 2016, HTN, HLD     Admitted 2/21/23  Antolin Galeana is a 72-year-old male with significant history of CAD status post MI with stent prior to 2005, peripheral artery disease status, AAA status post into repair,  current 1/2 a pack a day smoker, hypertension and hyperlipidemia who presents to the hospital for acute nonradiating midsternal chest tightness that occurred at 2 a.m. after he let his dogs out.  Symptoms associated with nausea vomiting and diaphoresis.  Patient took home aspirin and wife drove patient to ED. patient moved back to [place] 2 weeks ago from [place].  Patient drove 18 hours here.  Denies both lower extremity edema or pain.   In ED EKG sinus Arik.  Chest tightness resolved after nitro and aspirin.  Troponin trend times -3 however D-dimer positive.      Admission for chest pain.  Patient with history of CAD status post MI stent prior to Lily.  ACS ruled out.  D-dimer positive both lower extremity ultrasound no DVT and CT no PE.  Cardiology offered left heart catheterization however patient would like outpatient ischemic workup.  Continue aspirin statin.  Continue to hold metoprolol secondary to bradycardia.  Patient hemodynamically stable for discharge home.   All findings and plan were explained to the patient. All questions and concerns were answered. Patient verbalized understanding. Patient is in stable condition to d/c home and has been informed to follow up with PCP and Cardiologist.     Reports CP relieved after NTG, denies SOB  EKG sinus bradycardia 53 otherwise ok  Troponin negative x 3       Had echo and stress test 5/2022 in [place] - results not viewable in care everywhere. Patient reports they were ok     Previously saw Dr Michael  # AAA s/p endovasc repair 12/2016 ([facility])  # HTN, controlled  # CAD, hx  of distant PCI, MPI 1/2017 normal  # HLP, on atorva 40mg  # tob abuse, pt again strongly encouraged to quit.  He has previously been enrolled in the smoking class.    Stress test 4/26/23    Normal myocardial perfusion scan. There is no evidence of myocardial ischemia or infarction.    There is a  mild to moderate intensity fixed perfusion abnormality in the inferior wall of the left ventricle secondary to diaphragm attenuation.    The gated perfusion images showed an ejection fraction of 67% post stress.    There is normal wall motion post stress.     Echo 2/21/23  The left ventricle is normal in size with concentric hypertrophy and normal systolic function.  The estimated ejection fraction is 70%.  Indeterminate left ventricular diastolic function.  Normal right ventricular size with normal right ventricular systolic function.  Mild left atrial enlargement.  Normal central venous pressure (3 mmHg).  The estimated PA systolic pressure is 12 mmHg.  The ascending aorta is mildly dilated. 3.8 cm     CTA abd/pel 11/20/17  Interval placement of aortobiiliac endovascular stent graft which is patent with no extravasation and with partial collapse of the native surrounding aneurysm sac.  Additional findings as detailed above including enlarged prostate gland, diverticulosis without CT findings of acute diverticulitis, left renal masses suggesting low density and high density cysts.        LE art US 1/9/17  1.  With regards to the right lower extremity there is no evidence of hemodynamically significant peripheral vascular disease. LASHAUN 1.07  2.  With regards to the left lower extremity there is no evidence of hemodynamically significant peripheral vascular disease. LASHAUN 1.08        4/4/23 Denies CP or SOB since discharge. Requesting nicotine patch  BP controlled  Lexiscan myoview for recent CP   Continue Rx for HTN, HLD, CAD, AAA     5/1/23 CP resolved. Denies SOB  BP controlled    Review of Systems   Constitutional: Negative  for decreased appetite.   HENT:  Negative for ear discharge.    Eyes:  Negative for blurred vision.   Endocrine: Negative for polyphagia.   Skin:  Negative for nail changes.   Genitourinary:  Negative for bladder incontinence.   Neurological:  Negative for aphonia.   Psychiatric/Behavioral:  Negative for hallucinations.    Allergic/Immunologic: Negative for hives.     Objective:   Physical Exam  Constitutional:       Appearance: He is well-developed.   HENT:      Head: Normocephalic and atraumatic.   Eyes:      Conjunctiva/sclera: Conjunctivae normal.      Pupils: Pupils are equal, round, and reactive to light.   Cardiovascular:      Rate and Rhythm: Normal rate.      Pulses: Intact distal pulses.      Heart sounds: Normal heart sounds.   Pulmonary:      Effort: Pulmonary effort is normal.      Breath sounds: Normal breath sounds.   Abdominal:      General: Bowel sounds are normal.      Palpations: Abdomen is soft.   Musculoskeletal:         General: Normal range of motion.      Cervical back: Normal range of motion and neck supple.   Skin:     General: Skin is warm and dry.   Neurological:      Mental Status: He is alert and oriented to person, place, and time.       Assessment:      1. Coronary artery disease involving native coronary artery of native heart without angina pectoris    2. Hypertension, essential    3. Infrarenal abdominal aortic aneurysm (AAA) without rupture    4. Mixed hyperlipidemia    5. History of bradycardia    6. Chest pain, cardiac        Plan:     Stress test stable and CP resolved - continue observation  Continue Rx for HTN, HLD, CAD, AAA   OV 6 months

## 2023-05-22 ENCOUNTER — OFFICE VISIT (OUTPATIENT)
Dept: FAMILY MEDICINE | Facility: CLINIC | Age: 73
End: 2023-05-22
Payer: MEDICARE

## 2023-05-22 VITALS
DIASTOLIC BLOOD PRESSURE: 60 MMHG | HEIGHT: 71 IN | HEART RATE: 63 BPM | OXYGEN SATURATION: 97 % | WEIGHT: 168.19 LBS | SYSTOLIC BLOOD PRESSURE: 114 MMHG | BODY MASS INDEX: 23.55 KG/M2 | TEMPERATURE: 98 F

## 2023-05-22 DIAGNOSIS — Z12.11 ENCOUNTER FOR COLORECTAL CANCER SCREENING: ICD-10-CM

## 2023-05-22 DIAGNOSIS — F51.01 PRIMARY INSOMNIA: ICD-10-CM

## 2023-05-22 DIAGNOSIS — K82.9 GALLBLADDER DISEASE: ICD-10-CM

## 2023-05-22 DIAGNOSIS — R30.0 DYSURIA: ICD-10-CM

## 2023-05-22 DIAGNOSIS — Z00.00 ANNUAL PHYSICAL EXAM: Primary | ICD-10-CM

## 2023-05-22 DIAGNOSIS — Z12.12 ENCOUNTER FOR COLORECTAL CANCER SCREENING: ICD-10-CM

## 2023-05-22 DIAGNOSIS — J43.8 OTHER EMPHYSEMA: ICD-10-CM

## 2023-05-22 PROCEDURE — 3288F FALL RISK ASSESSMENT DOCD: CPT | Mod: CPTII,S$GLB,, | Performed by: INTERNAL MEDICINE

## 2023-05-22 PROCEDURE — 99999 PR PBB SHADOW E&M-EST. PATIENT-LVL V: ICD-10-PCS | Mod: PBBFAC,,, | Performed by: INTERNAL MEDICINE

## 2023-05-22 PROCEDURE — 1159F MED LIST DOCD IN RCRD: CPT | Mod: CPTII,S$GLB,, | Performed by: INTERNAL MEDICINE

## 2023-05-22 PROCEDURE — 1160F RVW MEDS BY RX/DR IN RCRD: CPT | Mod: CPTII,S$GLB,, | Performed by: INTERNAL MEDICINE

## 2023-05-22 PROCEDURE — 99999 PR PBB SHADOW E&M-EST. PATIENT-LVL V: CPT | Mod: PBBFAC,,, | Performed by: INTERNAL MEDICINE

## 2023-05-22 PROCEDURE — 1159F PR MEDICATION LIST DOCUMENTED IN MEDICAL RECORD: ICD-10-PCS | Mod: CPTII,S$GLB,, | Performed by: INTERNAL MEDICINE

## 2023-05-22 PROCEDURE — 3078F PR MOST RECENT DIASTOLIC BLOOD PRESSURE < 80 MM HG: ICD-10-PCS | Mod: CPTII,S$GLB,, | Performed by: INTERNAL MEDICINE

## 2023-05-22 PROCEDURE — 1101F PR PT FALLS ASSESS DOC 0-1 FALLS W/OUT INJ PAST YR: ICD-10-PCS | Mod: CPTII,S$GLB,, | Performed by: INTERNAL MEDICINE

## 2023-05-22 PROCEDURE — 99397 PER PM REEVAL EST PAT 65+ YR: CPT | Mod: GZ,S$GLB,, | Performed by: INTERNAL MEDICINE

## 2023-05-22 PROCEDURE — 99215 OFFICE O/P EST HI 40 MIN: CPT | Mod: PO | Performed by: INTERNAL MEDICINE

## 2023-05-22 PROCEDURE — 1126F AMNT PAIN NOTED NONE PRSNT: CPT | Mod: CPTII,S$GLB,, | Performed by: INTERNAL MEDICINE

## 2023-05-22 PROCEDURE — 1126F PR PAIN SEVERITY QUANTIFIED, NO PAIN PRESENT: ICD-10-PCS | Mod: CPTII,S$GLB,, | Performed by: INTERNAL MEDICINE

## 2023-05-22 PROCEDURE — 1101F PT FALLS ASSESS-DOCD LE1/YR: CPT | Mod: CPTII,S$GLB,, | Performed by: INTERNAL MEDICINE

## 2023-05-22 PROCEDURE — 1160F PR REVIEW ALL MEDS BY PRESCRIBER/CLIN PHARMACIST DOCUMENTED: ICD-10-PCS | Mod: CPTII,S$GLB,, | Performed by: INTERNAL MEDICINE

## 2023-05-22 PROCEDURE — 3008F BODY MASS INDEX DOCD: CPT | Mod: CPTII,S$GLB,, | Performed by: INTERNAL MEDICINE

## 2023-05-22 PROCEDURE — 3074F PR MOST RECENT SYSTOLIC BLOOD PRESSURE < 130 MM HG: ICD-10-PCS | Mod: CPTII,S$GLB,, | Performed by: INTERNAL MEDICINE

## 2023-05-22 PROCEDURE — 3288F PR FALLS RISK ASSESSMENT DOCUMENTED: ICD-10-PCS | Mod: CPTII,S$GLB,, | Performed by: INTERNAL MEDICINE

## 2023-05-22 PROCEDURE — 3074F SYST BP LT 130 MM HG: CPT | Mod: CPTII,S$GLB,, | Performed by: INTERNAL MEDICINE

## 2023-05-22 PROCEDURE — 3008F PR BODY MASS INDEX (BMI) DOCUMENTED: ICD-10-PCS | Mod: CPTII,S$GLB,, | Performed by: INTERNAL MEDICINE

## 2023-05-22 PROCEDURE — 99397 PR PREVENTIVE VISIT,EST,65 & OVER: ICD-10-PCS | Mod: GZ,S$GLB,, | Performed by: INTERNAL MEDICINE

## 2023-05-22 PROCEDURE — 3078F DIAST BP <80 MM HG: CPT | Mod: CPTII,S$GLB,, | Performed by: INTERNAL MEDICINE

## 2023-05-22 RX ORDER — TRAZODONE HYDROCHLORIDE 50 MG/1
50 TABLET ORAL NIGHTLY
Qty: 30 TABLET | Refills: 0 | Status: SHIPPED | OUTPATIENT
Start: 2023-05-22 | End: 2024-05-21

## 2023-05-22 RX ORDER — HYDRALAZINE HYDROCHLORIDE 25 MG/1
25 TABLET, FILM COATED ORAL 3 TIMES DAILY
COMMUNITY
Start: 2023-05-07 | End: 2023-08-14 | Stop reason: SDUPTHER

## 2023-05-22 NOTE — PROGRESS NOTES
SUBJECTIVE     Chief Complaint   Patient presents with    Annual Exam       HPI  Antolin Richardson is a 72 y.o. male with multiple medical diagnoses as listed in the medical history and problem list that presents for annual exam. Pt has been doing well since his last visit. He has a good appetite and eats well. He does exercise by walking and doing home exercises daily. He sleeps for ~5 hours nightly 2/2 difficulty staying asleep. Pt does not take OTC supplements. He does not have any current stressors. Pt is not UTD on age appropriate CA screening.    PAST MEDICAL HISTORY:  Past Medical History:   Diagnosis Date    AAA (abdominal aortic aneurysm) 12/2016    s/p endo repair (Fela)    Acid reflux     Anxiety     Coronary artery disease     Coronary artery disease involving native coronary artery of native heart without angina pectoris 1/3/2017    Hypertension     Myocardial infarction        PAST SURGICAL HISTORY:  Past Surgical History:   Procedure Laterality Date    ABDOMINAL AORTIC ANEURYSM REPAIR      APPENDECTOMY      COLONOSCOPY N/A 10/25/2018    Procedure: COLONOSCOPY;  Surgeon: Jackie Linares MD;  Location: [facility] ENDO;  Service: Endoscopy;  Laterality: N/A;    CORONARY ANGIOPLASTY  2009    coronary artery stent      x1    CYSTOSCOPY N/A 10/22/2018    Procedure: CYSTOSCOPY;  Surgeon: Poly Marcum MD;  Location: [facility] OR;  Service: Urology;  Laterality: N/A;    stent for abdominal aneurysm      TRANSURETHRAL RESECTION OF PROSTATE N/A 8/1/2018    Procedure: TRANSURETHRAL RESECTION OF PROSTATE (TURP);  Surgeon: Poly Marcum MD;  Location: [facility] OR;  Service: Urology;  Laterality: N/A;       SOCIAL HISTORY:  Social History     Socioeconomic History    Marital status:    Tobacco Use    Smoking status: Every Day     Packs/day: 2.00     Years: 20.00     Pack years: 40.00     Types: Cigarettes    Smokeless tobacco: Never   Substance and Sexual Activity    Alcohol use: Yes     Comment:  occasionally    Drug use: No    Sexual activity: Yes     Partners: Female       FAMILY HISTORY:  Family History   Problem Relation Age of Onset    Cancer Mother         brain    Dementia Father         alzheimer's    Hypertension Father        ALLERGIES AND MEDICATIONS: updated and reviewed.  Review of patient's allergies indicates:   Allergen Reactions    Lisinopril Itching     Current Outpatient Medications   Medication Sig Dispense Refill    amLODIPine (NORVASC) 10 MG tablet Take 1 tablet (10 mg total) by mouth once daily. 90 tablet 3    aspirin (ECOTRIN) 81 MG EC tablet Take 1 tablet (81 mg total) by mouth once daily. 90 tablet 3    atorvastatin (LIPITOR) 40 MG tablet Take 1 tablet (40 mg total) by mouth every evening. 90 tablet 3    hydrALAZINE (APRESOLINE) 25 MG tablet Take 25 mg by mouth 3 (three) times daily.      ondansetron (ZOFRAN-ODT) 4 MG TbDL Take 1 tablet (4 mg total) by mouth every 8 (eight) hours as needed (nausea/vomiting). (Patient not taking: Reported on 5/22/2023) 12 tablet 0    traZODone (DESYREL) 50 MG tablet Take 1 tablet (50 mg total) by mouth every evening. 30 tablet 0     No current facility-administered medications for this visit.       ROS  Review of Systems   Constitutional:  Positive for chills and fever.   HENT:  Negative for hearing loss and sore throat.    Eyes:  Negative for visual disturbance.   Respiratory:  Negative for cough and shortness of breath.    Cardiovascular:  Negative for chest pain, palpitations and leg swelling.   Gastrointestinal:  Positive for diarrhea. Negative for abdominal pain, constipation, nausea and vomiting.   Genitourinary:  Negative for dysuria, frequency and urgency.   Musculoskeletal:  Negative for arthralgias, joint swelling and myalgias.   Skin:  Negative for rash and wound.   Neurological:  Negative for headaches.   Psychiatric/Behavioral:  Negative for agitation and confusion. The patient is not nervous/anxious.        OBJECTIVE     Physical  "Exam  Vitals:    05/22/23 0945   BP: 114/60   Pulse: 63   Temp: 97.8 °F (36.6 °C)    Body mass index is 23.46 kg/m².  Weight: 76.3 kg (168 lb 3.4 oz)   Height: 5' 11" (180.3 cm)     Physical Exam  Constitutional:       General: He is not in acute distress.     Appearance: He is well-developed.   HENT:      Head: Normocephalic and atraumatic.      Right Ear: Tympanic membrane, ear canal and external ear normal.      Left Ear: Tympanic membrane, ear canal and external ear normal.      Nose: Nose normal.   Eyes:      General: No scleral icterus.        Right eye: No discharge.         Left eye: No discharge.      Conjunctiva/sclera: Conjunctivae normal.   Neck:      Vascular: No JVD.      Trachea: No tracheal deviation.   Cardiovascular:      Rate and Rhythm: Normal rate and regular rhythm.      Heart sounds: Normal heart sounds. No murmur heard.    No friction rub. No gallop.   Pulmonary:      Effort: Pulmonary effort is normal. No respiratory distress.      Breath sounds: Normal breath sounds. No wheezing.   Abdominal:      General: Bowel sounds are normal. There is no distension.      Palpations: Abdomen is soft. There is no mass.      Tenderness: There is no abdominal tenderness. There is no right CVA tenderness, left CVA tenderness, guarding or rebound.   Musculoskeletal:         General: No tenderness or deformity. Normal range of motion.      Cervical back: Normal range of motion and neck supple.   Skin:     General: Skin is warm and dry.      Findings: No erythema or rash.   Neurological:      Mental Status: He is alert and oriented to person, place, and time.      Motor: No abnormal muscle tone.      Coordination: Coordination normal.   Psychiatric:         Behavior: Behavior normal.         Thought Content: Thought content normal.         Judgment: Judgment normal.         Health Maintenance         Date Due Completion Date    TETANUS VACCINE Never done ---    Colorectal Cancer Screening 10/25/2021 10/25/2018 "    COVID-19 Vaccine (3 - Booster for Zachary series) 01/14/2022 11/19/2021    Pneumococcal Vaccines (Age 65+) (3 - PPSV23 if available, else PCV20) 11/17/2022 11/17/2017    LDCT Lung Screen 02/21/2024 2/21/2023    Lipid Panel 02/21/2024 2/21/2023    High Dose Statin 04/27/2024 4/27/2023              ASSESSMENT     72 y.o. male with     1. Annual physical exam    2. Primary insomnia    3. Other emphysema    4. Dysuria    5. Gallbladder disease    6. Encounter for colorectal cancer screening        PLAN:     1. Annual physical exam  - Counseled on age appropriate medical preventative services, including age appropriate cancer screenings, over all nutritional health, need for a consistent exercise regimen and an over all push towards maintaining a vigorous and active lifestyle.  Counseled on age appropriate vaccines and discussed upcoming health care needs based on age/gender.  Spent time with patient counseling on need for a good patient/doctor relationship moving forward.  Discussed use of common OTC medications and supplements.  Discussed common dietary aids and use of caffeine and the need for good sleep hygiene and stress management.    2. Primary insomnia  - start trial trazodone  - traZODone (DESYREL) 50 MG tablet; Take 1 tablet (50 mg total) by mouth every evening.  Dispense: 30 tablet; Refill: 0    3. Other emphysema  - Stable; no acute issues    4. Dysuria  - r/o UTI  - Urinalysis; Future  - Urine culture; Future    5. Gallbladder disease  - Ambulatory referral/consult to General Surgery; Future    6. Encounter for colorectal cancer screening  - Ambulatory referral/consult to Endo Procedure ; Future        RTC in 4 weeks for repeat assessment of current treatment plan       Sayda Cheung MD  05/22/2023 10:00 AM        No follow-ups on file.

## 2023-05-25 ENCOUNTER — OFFICE VISIT (OUTPATIENT)
Dept: SURGERY | Facility: CLINIC | Age: 73
End: 2023-05-25
Payer: MEDICARE

## 2023-05-25 VITALS
OXYGEN SATURATION: 96 % | DIASTOLIC BLOOD PRESSURE: 76 MMHG | SYSTOLIC BLOOD PRESSURE: 135 MMHG | HEIGHT: 71 IN | WEIGHT: 165.44 LBS | HEART RATE: 62 BPM | BODY MASS INDEX: 23.16 KG/M2

## 2023-05-25 DIAGNOSIS — K82.9 GALLBLADDER DISEASE: ICD-10-CM

## 2023-05-25 DIAGNOSIS — I71.40 ABDOMINAL AORTIC ANEURYSM (AAA) WITHOUT RUPTURE, UNSPECIFIED PART: Primary | ICD-10-CM

## 2023-05-25 DIAGNOSIS — I71.43 INFRARENAL ABDOMINAL AORTIC ANEURYSM (AAA) WITHOUT RUPTURE: ICD-10-CM

## 2023-05-25 PROCEDURE — 1159F MED LIST DOCD IN RCRD: CPT | Mod: CPTII,S$GLB,, | Performed by: SURGERY

## 2023-05-25 PROCEDURE — 1101F PT FALLS ASSESS-DOCD LE1/YR: CPT | Mod: CPTII,S$GLB,, | Performed by: SURGERY

## 2023-05-25 PROCEDURE — 3078F PR MOST RECENT DIASTOLIC BLOOD PRESSURE < 80 MM HG: ICD-10-PCS | Mod: CPTII,S$GLB,, | Performed by: SURGERY

## 2023-05-25 PROCEDURE — 3288F PR FALLS RISK ASSESSMENT DOCUMENTED: ICD-10-PCS | Mod: CPTII,S$GLB,, | Performed by: SURGERY

## 2023-05-25 PROCEDURE — 99999 PR PBB SHADOW E&M-EST. PATIENT-LVL IV: ICD-10-PCS | Mod: PBBFAC,,, | Performed by: SURGERY

## 2023-05-25 PROCEDURE — 1126F AMNT PAIN NOTED NONE PRSNT: CPT | Mod: CPTII,S$GLB,, | Performed by: SURGERY

## 2023-05-25 PROCEDURE — 3288F FALL RISK ASSESSMENT DOCD: CPT | Mod: CPTII,S$GLB,, | Performed by: SURGERY

## 2023-05-25 PROCEDURE — 1126F PR PAIN SEVERITY QUANTIFIED, NO PAIN PRESENT: ICD-10-PCS | Mod: CPTII,S$GLB,, | Performed by: SURGERY

## 2023-05-25 PROCEDURE — 99204 OFFICE O/P NEW MOD 45 MIN: CPT | Mod: S$GLB,,, | Performed by: SURGERY

## 2023-05-25 PROCEDURE — 3075F SYST BP GE 130 - 139MM HG: CPT | Mod: CPTII,S$GLB,, | Performed by: SURGERY

## 2023-05-25 PROCEDURE — 1159F PR MEDICATION LIST DOCUMENTED IN MEDICAL RECORD: ICD-10-PCS | Mod: CPTII,S$GLB,, | Performed by: SURGERY

## 2023-05-25 PROCEDURE — 3075F PR MOST RECENT SYSTOLIC BLOOD PRESS GE 130-139MM HG: ICD-10-PCS | Mod: CPTII,S$GLB,, | Performed by: SURGERY

## 2023-05-25 PROCEDURE — 3008F BODY MASS INDEX DOCD: CPT | Mod: CPTII,S$GLB,, | Performed by: SURGERY

## 2023-05-25 PROCEDURE — 3078F DIAST BP <80 MM HG: CPT | Mod: CPTII,S$GLB,, | Performed by: SURGERY

## 2023-05-25 PROCEDURE — 3008F PR BODY MASS INDEX (BMI) DOCUMENTED: ICD-10-PCS | Mod: CPTII,S$GLB,, | Performed by: SURGERY

## 2023-05-25 PROCEDURE — 99999 PR PBB SHADOW E&M-EST. PATIENT-LVL IV: CPT | Mod: PBBFAC,,, | Performed by: SURGERY

## 2023-05-25 PROCEDURE — 1101F PR PT FALLS ASSESS DOC 0-1 FALLS W/OUT INJ PAST YR: ICD-10-PCS | Mod: CPTII,S$GLB,, | Performed by: SURGERY

## 2023-05-25 PROCEDURE — 99204 PR OFFICE/OUTPT VISIT, NEW, LEVL IV, 45-59 MIN: ICD-10-PCS | Mod: S$GLB,,, | Performed by: SURGERY

## 2023-05-25 NOTE — H&P
History & Physical    SUBJECTIVE:     History of Present Illness:  Patient is a 72 y.o. male presents with ''gallbladder disease'. Was told in [place] several months ago after a trauma that based on imaging he had 'gallbladder problem' and maybe a 'hole in the gallbladder'. He said surgery was offered but he declined. Since that time he has had RUQ flank pains, very occasional and mild.  He does hx a significant hx of a AAA EVAR repair many years ago and has not been followed for this.  Generally he eats well with no n/v or causing of pain.  No f/c/n/v/sob/cp      Chief Complaint   Patient presents with    Abdominal Pain       Review of patient's allergies indicates:   Allergen Reactions    Lisinopril Itching       Current Outpatient Medications   Medication Sig Dispense Refill    amLODIPine (NORVASC) 10 MG tablet Take 1 tablet (10 mg total) by mouth once daily. 90 tablet 3    aspirin (ECOTRIN) 81 MG EC tablet Take 1 tablet (81 mg total) by mouth once daily. 90 tablet 3    atorvastatin (LIPITOR) 40 MG tablet Take 1 tablet (40 mg total) by mouth every evening. 90 tablet 3    hydrALAZINE (APRESOLINE) 25 MG tablet Take 25 mg by mouth 3 (three) times daily.      ondansetron (ZOFRAN-ODT) 4 MG TbDL Take 1 tablet (4 mg total) by mouth every 8 (eight) hours as needed (nausea/vomiting). 12 tablet 0    traZODone (DESYREL) 50 MG tablet Take 1 tablet (50 mg total) by mouth every evening. 30 tablet 0     No current facility-administered medications for this visit.       Past Medical History:   Diagnosis Date    AAA (abdominal aortic aneurysm) 12/2016    s/p endo repair ([facility])    Acid reflux     Anxiety     Coronary artery disease     Coronary artery disease involving native coronary artery of native heart without angina pectoris 1/3/2017    Hypertension     Myocardial infarction      Past Surgical History:   Procedure Laterality Date    ABDOMINAL AORTIC ANEURYSM REPAIR      APPENDECTOMY      COLONOSCOPY N/A 10/25/2018  "   Procedure: COLONOSCOPY;  Surgeon: Jackie Linares MD;  Location: [facility] ENDO;  Service: Endoscopy;  Laterality: N/A;    CORONARY ANGIOPLASTY  2009    coronary artery stent      x1    CYSTOSCOPY N/A 10/22/2018    Procedure: CYSTOSCOPY;  Surgeon: Poly Marcum MD;  Location: [facility] OR;  Service: Urology;  Laterality: N/A;    stent for abdominal aneurysm      TRANSURETHRAL RESECTION OF PROSTATE N/A 8/1/2018    Procedure: TRANSURETHRAL RESECTION OF PROSTATE (TURP);  Surgeon: Poly Marcum MD;  Location: [facility] OR;  Service: Urology;  Laterality: N/A;     Family History   Problem Relation Age of Onset    Cancer Mother         brain    Dementia Father         alzheimer's    Hypertension Father      Social History     Tobacco Use    Smoking status: Every Day     Packs/day: 2.00     Years: 20.00     Pack years: 40.00     Types: Cigarettes    Smokeless tobacco: Never   Substance Use Topics    Alcohol use: Yes     Comment: occasionally    Drug use: No        Review of Systems:  Review of Systems   Constitutional:  Negative for chills and fever.   HENT: Negative.     Eyes: Negative.    Respiratory:  Negative for cough, chest tightness and shortness of breath.    Cardiovascular: Negative.    Gastrointestinal:  Positive for abdominal pain. Negative for blood in stool, constipation, diarrhea, nausea and vomiting.   Endocrine: Negative for cold intolerance and heat intolerance.   Genitourinary: Negative.    Musculoskeletal: Negative.    Skin: Negative.  Negative for rash.   Neurological:  Negative for dizziness, syncope and light-headedness.   Psychiatric/Behavioral:  Negative for agitation, confusion and hallucinations.      OBJECTIVE:     Vital Signs (Most Recent)  Pulse: 62 (05/25/23 0835)  BP: 135/76 (05/25/23 0835)  SpO2: 96 % (05/25/23 0835)  5' 11" (1.803 m)  75 kg (165 lb 7.3 oz)     Physical Exam:  Physical Exam  Constitutional:       General: He is not in acute distress.     Appearance: He is " well-developed. He is not diaphoretic.   HENT:      Head: Normocephalic and atraumatic.   Eyes:      Conjunctiva/sclera: Conjunctivae normal.      Pupils: Pupils are equal, round, and reactive to light.   Cardiovascular:      Rate and Rhythm: Normal rate and regular rhythm.      Pulses: Normal pulses.      Heart sounds: Normal heart sounds.   Pulmonary:      Effort: Pulmonary effort is normal. No respiratory distress.      Breath sounds: Normal breath sounds. No stridor. No wheezing.   Abdominal:      General: Bowel sounds are normal. There is no distension.      Palpations: Abdomen is soft.      Tenderness: There is no abdominal tenderness.   Musculoskeletal:         General: Normal range of motion.      Cervical back: Normal range of motion and neck supple.   Skin:     General: Skin is warm and dry.      Findings: No rash.   Neurological:      Mental Status: He is alert and oriented to person, place, and time.      Cranial Nerves: No cranial nerve deficit.   Psychiatric:         Behavior: Behavior normal.       Laboratory  CBC: Reviewed  BMP: Reviewed  ok    Diagnostic Results:  US: Reviewed  CT: Reviewed  Reviewed. No evidence of stones on CT from >1 yr ago    ASSESSMENT/PLAN:     72 yr old male w AAA s/p EVAR w HTN HLD w ruq pain and verbal report from another hospital of gallbladder disease    PLAN:Plan     Refer to vascular to surveil AAA  Us gallbladder  Will call w result.

## 2023-05-26 DIAGNOSIS — I71.43 INFRARENAL ABDOMINAL AORTIC ANEURYSM (AAA) WITHOUT RUPTURE: Primary | ICD-10-CM

## 2023-06-01 ENCOUNTER — HOSPITAL ENCOUNTER (OUTPATIENT)
Dept: RADIOLOGY | Facility: HOSPITAL | Age: 73
Discharge: HOME | End: 2023-06-01
Attending: SURGERY
Payer: MEDICARE

## 2023-06-01 DIAGNOSIS — K82.9 GALLBLADDER DISEASE: ICD-10-CM

## 2023-06-01 PROCEDURE — 76705 US ABDOMEN LIMITED_GALLBLADDER: ICD-10-PCS | Mod: 26,,, | Performed by: RADIOLOGY

## 2023-06-01 PROCEDURE — 76705 ECHO EXAM OF ABDOMEN: CPT | Mod: TC

## 2023-06-01 PROCEDURE — 76705 ECHO EXAM OF ABDOMEN: CPT | Mod: 26,,, | Performed by: RADIOLOGY

## 2023-06-05 ENCOUNTER — HOSPITAL ENCOUNTER (OUTPATIENT)
Dept: RADIOLOGY | Facility: HOSPITAL | Age: 73
Discharge: HOME | End: 2023-06-05
Attending: SURGERY
Payer: MEDICARE

## 2023-06-05 ENCOUNTER — INITIAL CONSULT (OUTPATIENT)
Dept: VASCULAR SURGERY | Facility: CLINIC | Age: 73
End: 2023-06-05
Payer: MEDICARE

## 2023-06-05 VITALS
DIASTOLIC BLOOD PRESSURE: 81 MMHG | WEIGHT: 167.44 LBS | HEART RATE: 58 BPM | BODY MASS INDEX: 23.35 KG/M2 | SYSTOLIC BLOOD PRESSURE: 136 MMHG

## 2023-06-05 DIAGNOSIS — I71.40 ABDOMINAL AORTIC ANEURYSM (AAA) WITHOUT RUPTURE, UNSPECIFIED PART: Primary | ICD-10-CM

## 2023-06-05 DIAGNOSIS — I71.43 INFRARENAL ABDOMINAL AORTIC ANEURYSM (AAA) WITHOUT RUPTURE: ICD-10-CM

## 2023-06-05 PROCEDURE — 99499 UNLISTED E&M SERVICE: CPT | Mod: S$GLB,,, | Performed by: SURGERY

## 2023-06-05 PROCEDURE — 99499 RISK ADDL DX/OHS AUDIT: ICD-10-PCS | Mod: S$GLB,,, | Performed by: SURGERY

## 2023-06-05 PROCEDURE — 99204 PR OFFICE/OUTPT VISIT, NEW, LEVL IV, 45-59 MIN: ICD-10-PCS | Mod: S$GLB,,, | Performed by: SURGERY

## 2023-06-05 PROCEDURE — 74174 CTA ABD&PLVS W/CONTRAST: CPT | Mod: 26,,, | Performed by: RADIOLOGY

## 2023-06-05 PROCEDURE — 99999 PR PBB SHADOW E&M-EST. PATIENT-LVL III: ICD-10-PCS | Mod: PBBFAC,,, | Performed by: SURGERY

## 2023-06-05 PROCEDURE — 99999 PR PBB SHADOW E&M-EST. PATIENT-LVL III: CPT | Mod: PBBFAC,,, | Performed by: SURGERY

## 2023-06-05 PROCEDURE — 74174 CTA ABD&PLVS W/CONTRAST: CPT | Mod: TC

## 2023-06-05 PROCEDURE — 99204 OFFICE O/P NEW MOD 45 MIN: CPT | Mod: S$GLB,,, | Performed by: SURGERY

## 2023-06-05 PROCEDURE — 25500020 PHARM REV CODE 255: Performed by: SURGERY

## 2023-06-05 PROCEDURE — 74174 CTA ABDOMEN AND PELVIS: ICD-10-PCS | Mod: 26,,, | Performed by: RADIOLOGY

## 2023-06-05 RX ADMIN — IOHEXOL 100 ML: 350 INJECTION, SOLUTION INTRAVENOUS at 12:06

## 2023-06-05 NOTE — PROGRESS NOTES
Nikolai Bray MD, PhD  [facility]   06/05/2023    HPI:  Antolin Richardson is a 72 y.o. male with a history of abdominal aortic aneurysm (AAA), who was referred by Dr Zamora.      Hx of left lower leg swelling discoloration on was draining from his like to back to me it is draining yellow since his fasciotomy  Patient states location is R costal margin occurring since February which started after a Fall on to this area of his ribs when pt was walking on ice. .  Associated signs and symptoms non.  Quality is sharp, short shock like,  and severity is 6/10.    Alleviating factors none.  Worsening factors none.  Pain is not radiating and does not radiate to his back.  Denies abdominal pain.   Pain comes and goes infrequently and randomly lasting only asting only a few seconds.    little follow up since his EVAR in 2016.  Had CT in 2018 , which showed stable repair, no EL, sac measureing 4cm.  Device appears to be a Medtronic with suprarenal fixation    Tobacco use: still actively smoking    Patient Active Problem List   Diagnosis    Anxiety    Hypertension, essential    Current every day smoker    Lung nodule    Abdominal aortic aneurysm    Coronary artery disease involving native coronary artery of native heart without angina pectoris    Mixed hyperlipidemia    Erectile dysfunction due to arterial insufficiency    Tendonitis, Achilles, right    Colon polyps    BPH (benign prostatic hyperplasia)    Benign prostatic hyperplasia    KATELYN (acute kidney injury)    Other emphysema    History of bradycardia    Chest pain, cardiac    Primary insomnia     Past Medical History:   Diagnosis Date    AAA (abdominal aortic aneurysm) 12/2016    s/p endo repair ([facility]ela)    Acid reflux     Anxiety     Coronary artery disease     Coronary artery disease involving native coronary artery of native heart without angina pectoris 1/3/2017    Hypertension     Myocardial infarction      Past Surgical History:    Procedure Laterality Date    ABDOMINAL AORTIC ANEURYSM REPAIR      APPENDECTOMY      COLONOSCOPY N/A 10/25/2018    Procedure: COLONOSCOPY;  Surgeon: Jackie Linares MD;  Location: [facility] ENDO;  Service: Endoscopy;  Laterality: N/A;    CORONARY ANGIOPLASTY  2009    coronary artery stent      x1    CYSTOSCOPY N/A 10/22/2018    Procedure: CYSTOSCOPY;  Surgeon: Poly Marcum MD;  [facility] OR;  Service: Urology;  Laterality: N/A;    stent for abdominal aneurysm      TRANSURETHRAL RESECTION OF PROSTATE N/A 8/1/2018    Procedure: TRANSURETHRAL RESECTION OF PROSTATE (TURP);  Surgeon: Poly Marcum MD;  Location: [facility] OR;  Service: Urology;  Laterality: N/A;     Family History   Problem Relation Age of Onset    Cancer Mother         brain    Dementia Father         alzheimer's    Hypertension Father      Social History     Socioeconomic History    Marital status:    Tobacco Use    Smoking status: Every Day     Packs/day: 2.00     Years: 20.00     Pack years: 40.00     Types: Cigarettes    Smokeless tobacco: Never   Substance and Sexual Activity    Alcohol use: Yes     Comment: occasionally    Drug use: No    Sexual activity: Yes     Partners: Female       Current Outpatient Medications:     amLODIPine (NORVASC) 10 MG tablet, Take 1 tablet (10 mg total) by mouth once daily., Disp: 90 tablet, Rfl: 3    aspirin (ECOTRIN) 81 MG EC tablet, Take 1 tablet (81 mg total) by mouth once daily., Disp: 90 tablet, Rfl: 3    atorvastatin (LIPITOR) 40 MG tablet, Take 1 tablet (40 mg total) by mouth every evening., Disp: 90 tablet, Rfl: 3    hydrALAZINE (APRESOLINE) 25 MG tablet, Take 25 mg by mouth 3 (three) times daily., Disp: , Rfl:     ondansetron (ZOFRAN-ODT) 4 MG TbDL, Take 1 tablet (4 mg total) by mouth every 8 (eight) hours as needed (nausea/vomiting)., Disp: 12 tablet, Rfl: 0    traZODone (DESYREL) 50 MG tablet, Take 1 tablet (50 mg total) by mouth every evening., Disp: 30 tablet, Rfl: 0  No current  facility-administered medications for this visit.    REVIEW OF SYSTEMS:  Review of Systems   All other systems reviewed and are negative.       VITALS:  Vitals:    06/05/23 1317   BP: 136/81   BP Location: Right arm   Patient Position: Sitting   Pulse: (!) 58   Weight: 76 kg (167 lb 7 oz)        PHYSICAL EXAM:   Physical Exam  Constitutional:       General: He is not in acute distress.     Appearance: He is not ill-appearing.   Cardiovascular:      Rate and Rhythm: Normal rate and regular rhythm.   Pulmonary:      Effort: Pulmonary effort is normal.   Abdominal:      Palpations: Abdomen is soft.      Tenderness: There is no abdominal tenderness. There is no guarding or rebound.      Hernia: No hernia is present.    Exhibits TTP under/along R lateral costal margin which reproduces his symptoms with deep palpation        LAB RESULTS:  No results found for: CBC  Lab Results   Component Value Date    LABPROT 10.0 03/10/2014    INR 1.0 03/10/2014     Lab Results   Component Value Date     02/21/2023    K 4.0 02/21/2023     (H) 02/21/2023    CO2 21 (L) 02/21/2023    GLU 95 02/21/2023    BUN 21 02/21/2023    CREATININE 1.2 06/05/2023    CALCIUM 9.0 02/21/2023    ANIONGAP 9 02/21/2023    EGFRNONAA >60.0 10/13/2021     Lab Results   Component Value Date    WBC 9.63 02/21/2023    RBC 5.23 02/21/2023    HGB 15.3 02/21/2023    HCT 45.7 02/21/2023    MCV 87 02/21/2023    MCH 29.3 02/21/2023    MCHC 33.5 02/21/2023    RDW 14.2 02/21/2023     02/21/2023    MPV 9.7 02/21/2023    GRAN 5.6 02/21/2023    GRAN 58.2 02/21/2023    LYMPH 2.6 02/21/2023    LYMPH 27.2 02/21/2023    MONO 1.0 02/21/2023    MONO 10.8 02/21/2023    EOS 0.2 02/21/2023    BASO 0.08 02/21/2023    EOSINOPHIL 2.4 02/21/2023    BASOPHIL 0.8 02/21/2023    DIFFMETHOD Automated 02/21/2023     .  Lab Results   Component Value Date    HGBA1C 5.6 10/16/2020       IMAGING:  All pertinent imaging has been reviewed and interpreted independently.   Although  there is opacification outside of stent at level of renal arteries, this appears to be in location suprarenal fixation stents not covered by graft.  Below this there is long segment of complete opposition of stent graft in the neck of the AAA.  Stable sac size since 2018    IMP/PLAN:  Antolin Richardson is a 72 y.o. male AAA s/p EVAR (Medtronic) with Dr Chahal 12/12/2016 who was referred for pain in RUQ that appears to be musculoskeletal/neuropathic in nature.    Pain is not consistent with AAA symptoms.  Physical exam notable for TTP under/along R lateral costal margin which reproduces his symptoms with deep palpation.   No back pain or abdominal tenderness  Relevant vascular studies and imaging: CTA showing stable  AAA sac size (approx 3.8 cm) with 2.5- 3cm good infrarenal seal.   Appears to be non displaced healing rib fracture along R costal margin on CT  Plan:    Defer to PCP for conservative management of neuropathic/musculoskeletal pain  F/u in clinic in 6 months for aortic duplex surveillance  Counselled on smoking cessation          I spent 40 minutes evaluating this patient and greater than 50% of the time was spent counseling, coordinator care and discussing the plan of care.  All questions were answered and patient stated understanding with agreement with the above treatment plan.    Nikolai Bray MD, PhD  Vascular and Endovascular Surgery

## 2023-07-06 ENCOUNTER — OFFICE VISIT (OUTPATIENT)
Dept: URGENT CARE | Facility: CLINIC | Age: 73
End: 2023-07-06
Payer: MEDICARE

## 2023-07-06 VITALS
DIASTOLIC BLOOD PRESSURE: 80 MMHG | HEIGHT: 71 IN | HEART RATE: 64 BPM | BODY MASS INDEX: 23.38 KG/M2 | SYSTOLIC BLOOD PRESSURE: 144 MMHG | OXYGEN SATURATION: 95 % | TEMPERATURE: 98 F | WEIGHT: 167 LBS | RESPIRATION RATE: 18 BRPM

## 2023-07-06 DIAGNOSIS — S60.552A SUPERFICIAL FOREIGN BODY OF LEFT HAND, INITIAL ENCOUNTER: ICD-10-CM

## 2023-07-06 DIAGNOSIS — S60.459A FOREIGN BODY IN SKIN OF FINGER, INITIAL ENCOUNTER: ICD-10-CM

## 2023-07-06 DIAGNOSIS — S69.90XA FISH HOOK IN FINGER: Primary | ICD-10-CM

## 2023-07-06 PROCEDURE — 99213 PR OFFICE/OUTPT VISIT, EST, LEVL III, 20-29 MIN: ICD-10-PCS | Mod: 25,S$GLB,,

## 2023-07-06 PROCEDURE — 90715 TDAP VACCINE 7 YRS/> IM: CPT | Mod: S$GLB,,,

## 2023-07-06 PROCEDURE — 90471 TDAP VACCINE GREATER THAN OR EQUAL TO 7YO IM: ICD-10-PCS | Mod: S$GLB,,,

## 2023-07-06 PROCEDURE — 99213 OFFICE O/P EST LOW 20 MIN: CPT | Mod: 25,S$GLB,,

## 2023-07-06 PROCEDURE — 90715 TDAP VACCINE GREATER THAN OR EQUAL TO 7YO IM: ICD-10-PCS | Mod: S$GLB,,,

## 2023-07-06 PROCEDURE — 90471 IMMUNIZATION ADMIN: CPT | Mod: S$GLB,,,

## 2023-07-06 RX ORDER — DOXYCYCLINE 100 MG/1
100 CAPSULE ORAL EVERY 12 HOURS
Qty: 14 CAPSULE | Refills: 0 | Status: SHIPPED | OUTPATIENT
Start: 2023-07-06 | End: 2023-07-13

## 2023-07-06 RX ORDER — MUPIROCIN 20 MG/G
OINTMENT TOPICAL 3 TIMES DAILY
Qty: 22 G | Refills: 0 | Status: SHIPPED | OUTPATIENT
Start: 2023-07-06

## 2023-07-06 NOTE — PROGRESS NOTES
"Subjective:      Patient ID: Antolin Richardson is a 72 y.o. male.    Vitals:  height is 5' 11" (1.803 m) and weight is 75.8 kg (167 lb). His tympanic temperature is 98.4 °F (36.9 °C). His blood pressure is 144/80 (abnormal) and his pulse is 64. His respiration is 18 and oxygen saturation is 95%.     Chief Complaint: Foreign Body in Skin    Pt is a 73 y/o M who presents with a fishhook stuck in his L middle finger that occurred 1 hours PTA. He was getting his fishing gear together when the fishhook accidentally punctured his distal L middle finger. There was no water exposure. The fishhook was new and unused. It is a single-barbed fishing hook. No hx of diabetes. Denies numbness or tingling, erythema.    Injury  This is a new problem. The current episode started today. The problem occurs constantly. The problem has been unchanged. Pertinent negatives include no chills, fever, headaches or numbness. Exacerbated by: moving. He has tried nothing for the symptoms. The treatment provided no relief.   Constitution: Negative for chills and fever.   Musculoskeletal:  Positive for pain. Negative for abnormal ROM of joint.   Skin:  Positive for wound and puncture wound. Negative for erythema.   Neurological:  Negative for headaches, numbness and tingling.    Objective:     Physical Exam   Constitutional: He is oriented to person, place, and time. He appears well-developed.   HENT:   Head: Normocephalic and atraumatic.   Ears:   Right Ear: External ear normal.   Left Ear: External ear normal.   Nose: Nose normal.   Mouth/Throat: Oropharynx is clear and moist.   Eyes: Conjunctivae, EOM and lids are normal.   Neck: Trachea normal and phonation normal. Neck supple.   Musculoskeletal: Normal range of motion.         General: Normal range of motion.      Comments: [place] embedded in distal left middle finger. FROM of L middle finger at all joints. NVIT distally.   Neurological: He is alert and oriented to person, place, and time. "   Skin: Skin is warm, dry and intact. No erythema   Psychiatric: His speech is normal and behavior is normal. Judgment and thought content normal.   Nursing note and vitals reviewed.          Assessment:     1. Fish hook in finger    2. Foreign body in skin of finger, initial encounter    3. Superficial foreign body of left hand, initial encounter      Plan:     Fish hook in finger  -     doxycycline (VIBRAMYCIN) 100 MG Cap; Take 1 capsule (100 mg total) by mouth every 12 (twelve) hours. for 7 days  Dispense: 14 capsule; Refill: 0  -     mupirocin (BACTROBAN) 2 % ointment; Apply topically 3 (three) times daily.  Dispense: 22 g; Refill: 0    Foreign body in skin of finger, initial encounter  -     (In Office Administered) Tdap Vaccine    Superficial foreign body of left hand, initial encounter  -     (In Office Administered) Tdap Vaccine      Pt is a 73 y/o M who presents with a fishhook embedded to the distal L middle finger. VS wnl. On exam, the fishhook appears to be embedded superficially, with the barbed end almost penetrating through the skin. Pt has FROM of the L middle finger at al joints and is NVIT. Xrays unavailable in clinic, but I felt this was not necessary due to how superficial the fishhook was, and did not believe it to involve the bone. Because of how close the barbed end was to the skin, the decision was made to advance the fishhook through the skin. Local anesthetic was first performed over where the fishhook penetrated the skin. The yashira was then advanced through the skin. Once the barbed end was advanced through the skin, unsuccessful attempts were made to cut through the fishhook with wire cutters. After further discussion with the patient, he was agreeable to the incision technique as the hook was embedded superficially and I felt this would involve minimal tissue injury. An #11 scalpel was used to make a 1 cm incision along the length of the fishhook. The fishhook was successfully removed.  The wound was irrigated and I did not identify any other foreign bodies. The incision was closed with 3 6-0 nylon sutures. Wound was dressed with bactroban and gauze, and finger was placed in a splint. Pt tolerated the procedure well without complications. Tetanus updated. Pt was prescribed doxycycline and bactroban. Discussed that he can take tylenol or ibuprofen for pain. Discussed with patient to monitor for any signs of infection. Return to clinic in 7-10 days for suture removal. Pt verbalizes understanding and agrees with plan.            Patient Instructions   - Take antibiotics as prescribed  - Return to clinic in 7-10 days for suture removal.    - Keep finger in splint for protection    Keep your dressing on for 24 hours. Do not wet laceration for 12 hours.  After 24 hours remove the dressing and gently clean wound with soap and water at least twice daily unless more frequently soiled, then clean more frequently.      - Follow up with your PCP or specialty clinic as directed in the next 1-2 weeks if not improved or as needed.  You can call [phone] to schedule an appointment with the appropriate provider.    - Go to the ER or seek medical attention immediately if you develop new or worsening symptoms.    - You must understand that you have received an Urgent Care treatment only and that you may be released before all of your medical problems are known or treated.   - You, the patient, will arrange for follow up care as instructed.   - If your condition worsens or fails to improve we recommend that you receive another evaluation at the ER immediately or contact your PCP to discuss your concerns or return here.

## 2023-07-06 NOTE — PATIENT INSTRUCTIONS
- Take antibiotics as prescribed  - Return to clinic in 7-10 days for suture removal.    - Keep finger in splint for protection    Keep your dressing on for 24 hours. Do not wet laceration for 12 hours.  After 24 hours remove the dressing and gently clean wound with soap and water at least twice daily unless more frequently soiled, then clean more frequently.      - Follow up with your PCP or specialty clinic as directed in the next 1-2 weeks if not improved or as needed.  You can call [phone] to schedule an appointment with the appropriate provider.    - Go to the ER or seek medical attention immediately if you develop new or worsening symptoms.    - You must understand that you have received an Urgent Care treatment only and that you may be released before all of your medical problems are known or treated.   - You, the patient, will arrange for follow up care as instructed.   - If your condition worsens or fails to improve we recommend that you receive another evaluation at the ER immediately or contact your PCP to discuss your concerns or return here.

## 2023-07-13 ENCOUNTER — CLINICAL SUPPORT (OUTPATIENT)
Dept: URGENT CARE | Facility: CLINIC | Age: 73
End: 2023-07-13
Payer: MEDICARE

## 2023-07-13 VITALS
TEMPERATURE: 98 F | DIASTOLIC BLOOD PRESSURE: 75 MMHG | BODY MASS INDEX: 23.38 KG/M2 | OXYGEN SATURATION: 96 % | SYSTOLIC BLOOD PRESSURE: 128 MMHG | HEART RATE: 63 BPM | RESPIRATION RATE: 18 BRPM | WEIGHT: 167 LBS | HEIGHT: 71 IN

## 2023-07-13 DIAGNOSIS — Z48.02 VISIT FOR SUTURE REMOVAL: Primary | ICD-10-CM

## 2023-07-13 DIAGNOSIS — S69.90XA FISH HOOK IN FINGER: ICD-10-CM

## 2023-07-13 PROCEDURE — 99214 PR OFFICE/OUTPT VISIT, EST, LEVL IV, 30-39 MIN: ICD-10-PCS | Mod: S$GLB,,, | Performed by: EMERGENCY MEDICINE

## 2023-07-13 PROCEDURE — 99214 OFFICE O/P EST MOD 30 MIN: CPT | Mod: S$GLB,,, | Performed by: EMERGENCY MEDICINE

## 2023-07-13 NOTE — PROGRESS NOTES
"Subjective:      Patient ID: Antolin Richardson is a 72 y.o. male.    Vitals:  height is 5' 11" (1.803 m) and weight is 75.8 kg (167 lb). His oral temperature is 97.6 °F (36.4 °C). His blood pressure is 128/75 and his pulse is 63. His respiration is 18 and oxygen saturation is 96%.     Chief Complaint: Suture / Staple Removal    Pt is here for suture removal. Pt states he has no pain, drainage, swelling or redness to the area    Had 3 sutures placed left middle finger.  After removal of fishhook.  Doing well no infection no pain normal sensation wound looks great.  Sutures removed.    Suture / Staple Removal  Treated in ED: 7 days ago. He tried antibiotic ointment use and oral antibiotics since the wound repair. The treatment provided significant relief. His temperature was unmeasured prior to arrival. There has been no drainage from the wound. There is no redness present. There is no swelling present. There is no pain present. He has no difficulty moving the affected extremity or digit.     Constitution: Negative for chills and fever.   HENT:  Negative for postnasal drip, sinus pain and sore throat.    Neck: Negative for neck pain and neck stiffness.   Cardiovascular:  Negative for chest pain and palpitations.   Respiratory:  Negative for cough and shortness of breath.    Genitourinary:  Negative for dysuria and hematuria.   Musculoskeletal:  Negative for joint pain.   Skin:  Positive for wound. Negative for laceration and erythema.   Neurological:  Negative for altered mental status, numbness and tingling.   Psychiatric/Behavioral:  Negative for altered mental status.     Objective:     Physical Exam   Constitutional: He is oriented to person, place, and time. He appears well-developed.   HENT:   Head: Normocephalic and atraumatic. Head is without abrasion, without contusion and without laceration.   Ears:   Right Ear: External ear normal.   Left Ear: External ear normal.   Nose: Nose normal.   Mouth/Throat: " Oropharynx is clear and moist and mucous membranes are normal.   Eyes: Conjunctivae, EOM and lids are normal. Pupils are equal, round, and reactive to light.   Neck: Trachea normal and phonation normal. Neck supple.   Cardiovascular: Normal rate, regular rhythm and normal heart sounds.   Pulmonary/Chest: Effort normal and breath sounds normal. No stridor. No respiratory distress.   Musculoskeletal: Normal range of motion.         General: Normal range of motion.      Comments: Normal rom, no fb   Neurological: He is alert and oriented to person, place, and time.   Skin: Skin is warm, dry, intact and no rash. Capillary refill takes less than 2 seconds. No abrasion, No burn, No bruising, No erythema and No ecchymosis         Comments: Left middle finger distal volar aspect with 3 sutures in place, well healed, no infection, no ttp, normal rom and normal sensation   Psychiatric: His speech is normal and behavior is normal. Judgment and thought content normal.   Nursing note and vitals reviewed.    Assessment:     1. Visit for suture removal    2. Fish hook in finger        Plan:       Visit for suture removal    Fish hook in finger

## 2023-07-27 ENCOUNTER — TELEPHONE (OUTPATIENT)
Dept: FAMILY MEDICINE | Facility: CLINIC | Age: 73
End: 2023-07-27
Payer: MEDICARE

## 2023-07-27 NOTE — TELEPHONE ENCOUNTER
----- Message from Liane Hopper sent at 7/27/2023  8:17 AM CDT -----  .Type: Patient Call Back    Who called:self    What is the request in detail: patients wife is requesting a callback to discuss medications given to patient in Er, states theres been a mix up and patient has not been able to get medication from the pharm     Can the clinic reply by [facility]? call    Would the patient rather a call back or a response via [facility]? call    Best call back number: .[phone] please leave message     Additional Information:

## 2023-07-27 NOTE — TELEPHONE ENCOUNTER
----- Message from Erika Mei sent at 7/27/2023  9:23 AM CDT -----  .Type:  Patient Returning Call    Who Called: Self    Who Left Message for Patient: Celina    Does the patient know what this is regarding?:No     Would the patient rather a call back or a response via [facility]? Call Back     Best Call Back Number: .[phone] (home)       Additional Information:

## 2023-08-11 ENCOUNTER — TELEPHONE (OUTPATIENT)
Dept: ENDOSCOPY | Facility: HOSPITAL | Age: 73
End: 2023-08-11

## 2023-08-11 ENCOUNTER — CLINICAL SUPPORT (OUTPATIENT)
Dept: ENDOSCOPY | Facility: HOSPITAL | Age: 73
End: 2023-08-11
Attending: INTERNAL MEDICINE
Payer: MEDICARE

## 2023-08-11 VITALS — HEIGHT: 71 IN | WEIGHT: 167 LBS | BODY MASS INDEX: 23.38 KG/M2

## 2023-08-11 DIAGNOSIS — Z12.11 ENCOUNTER FOR COLORECTAL CANCER SCREENING: ICD-10-CM

## 2023-08-11 DIAGNOSIS — Z12.12 ENCOUNTER FOR COLORECTAL CANCER SCREENING: ICD-10-CM

## 2023-08-11 RX ORDER — SODIUM, POTASSIUM,MAG SULFATES 17.5-3.13G
1 SOLUTION, RECONSTITUTED, ORAL ORAL DAILY
Qty: 1 KIT | Refills: 0 | Status: SHIPPED | OUTPATIENT
Start: 2023-08-11 | End: 2023-08-13

## 2023-08-11 NOTE — PLAN OF CARE
Spoke to pt to schedule procedure(s) Colonoscopy       Physician to perform procedure(s) Dr. ANGI Alvarez  Date of Procedure (s) 11/2/23  Arrival Time 12:30 PM  Time of Procedure(s) 1:30 PM   Location of Procedure(s) [address]  Type of Rx Prep sent to patient: Suprep  Instructions provided to patient via [facility]    Patient was informed on the following information and verbalized understanding. Screening questionnaire reviewed with patient and complete. If procedure requires anesthesia, a responsible adult needs to be present to accompany the patient home, patient cannot drive after receiving anesthesia. Appointment details are tentative, especially check-in time. Patient will receive a prep-op call 4 days prior to confirm check-in time for procedure. If applicable the patient should contact their pharmacy to verify Rx for procedure prep is ready for pick-up. Patient was advised to call the scheduling department at [phone] if pharmacy states no Rx is available. Patient was advised to call the endoscopy scheduling department if any questions or concerns arise.      [facility] Scheduling Department

## 2023-08-11 NOTE — TELEPHONE ENCOUNTER
Spoke to pt to schedule procedure(s) Colonoscopy       Physician to perform procedure(s) Dr. ANGI Alvarez  Date of Procedure (s) 11/2/23  Arrival Time 12:30 PM  Time of Procedure(s) 1:30 PM   Location of Procedure(s) [address]  Type of Rx Prep sent to patient: Suprep  Instructions provided to patient via [facility]    Patient was informed on the following information and verbalized understanding. Screening questionnaire reviewed with patient and complete. If procedure requires anesthesia, a responsible adult needs to be present to accompany the patient home, patient cannot drive after receiving anesthesia. Appointment details are tentative, especially check-in time. Patient will receive a prep-op call 4 days prior to confirm check-in time for procedure. If applicable the patient should contact their pharmacy to verify Rx for procedure prep is ready for pick-up. Patient was advised to call the scheduling department at [phone] if pharmacy states no Rx is available. Patient was advised to call the endoscopy scheduling department if any questions or concerns arise.      [facility]oscopy Scheduling Department

## 2023-08-14 DIAGNOSIS — I10 HYPERTENSION, ESSENTIAL: Primary | ICD-10-CM

## 2023-08-14 RX ORDER — HYDRALAZINE HYDROCHLORIDE 25 MG/1
25 TABLET, FILM COATED ORAL 3 TIMES DAILY
Qty: 90 TABLET | Refills: 1 | Status: SHIPPED | OUTPATIENT
Start: 2023-08-14 | End: 2023-08-15 | Stop reason: SDUPTHER

## 2023-08-14 NOTE — TELEPHONE ENCOUNTER
----- Message from Zainab Nogueira sent at 8/14/2023  8:29 AM CDT -----  Regarding: Refill request  .Type: RX Refill Request    Who Called: self     Have you contacted your pharmacy:no     Refill or New Rx: Refill    RX Name and Strength:hydrALAZINE (APRESOLINE) 25 MG tablet    Preferred Pharmacy with phone number:  .  [id number] - [place] [address] AT [facility] [email or web address] [address] [address] [facility] [address]  Phone: [phone] Fax: [phone]    Local or Mail Order:Local     Ordering Provider:MONICO Cheung     Would the patient rather a call back or a response via [facility]? Call     Best Call Back Number: .[phone]      Additional Information: takes medication 3X a day

## 2023-08-14 NOTE — TELEPHONE ENCOUNTER
No care due was identified.  [facility] Embedded Care Due Messages. Reference number: [id number].   8/14/2023 9:10:22 AM CDT

## 2023-08-15 DIAGNOSIS — I10 HYPERTENSION, ESSENTIAL: ICD-10-CM

## 2023-08-15 RX ORDER — HYDRALAZINE HYDROCHLORIDE 25 MG/1
25 TABLET, FILM COATED ORAL 3 TIMES DAILY
Qty: 90 TABLET | Refills: 1 | Status: SHIPPED | OUTPATIENT
Start: 2023-08-15 | End: 2023-08-18 | Stop reason: SDUPTHER

## 2023-08-15 NOTE — TELEPHONE ENCOUNTER
No care due was identified.  [facility] Embedded Care Due Messages. Reference number: [id number].   8/15/2023 2:58:54 PM CDT

## 2023-08-18 DIAGNOSIS — I10 HYPERTENSION, ESSENTIAL: ICD-10-CM

## 2023-08-18 RX ORDER — HYDRALAZINE HYDROCHLORIDE 25 MG/1
25 TABLET, FILM COATED ORAL 3 TIMES DAILY
Qty: 90 TABLET | Refills: 1 | Status: SHIPPED | OUTPATIENT
Start: 2023-08-18 | End: 2023-08-21 | Stop reason: SDUPTHER

## 2023-08-18 NOTE — TELEPHONE ENCOUNTER
No care due was identified.  [facility] Embedded Care Due Messages. Reference number: [id number].   8/18/2023 9:32:17 AM CDT

## 2023-08-21 DIAGNOSIS — I10 HYPERTENSION, ESSENTIAL: ICD-10-CM

## 2023-08-21 RX ORDER — HYDRALAZINE HYDROCHLORIDE 25 MG/1
25 TABLET, FILM COATED ORAL 3 TIMES DAILY
Qty: 90 TABLET | Refills: 1 | Status: SHIPPED | OUTPATIENT
Start: 2023-08-21 | End: 2024-03-20 | Stop reason: SDUPTHER

## 2023-08-21 NOTE — TELEPHONE ENCOUNTER
No care due was identified.  [facility] Embedded Care Due Messages. Reference number: [id number].   8/21/2023 10:57:08 AM CDT

## 2023-10-26 ENCOUNTER — TELEPHONE (OUTPATIENT)
Dept: ENDOSCOPY | Facility: HOSPITAL | Age: 73
End: 2023-10-26
Payer: MEDICARE

## 2023-10-26 DIAGNOSIS — Z12.11 SCREEN FOR COLON CANCER: Primary | ICD-10-CM

## 2023-10-26 RX ORDER — SODIUM, POTASSIUM,MAG SULFATES 17.5-3.13G
1 SOLUTION, RECONSTITUTED, ORAL ORAL DAILY
Qty: 1 KIT | Refills: 0 | Status: SHIPPED | OUTPATIENT
Start: 2023-10-26 | End: 2023-10-28

## 2023-10-26 NOTE — TELEPHONE ENCOUNTER
Unable to leave voicemail for patient to call Endoscopy Scheduling to confirm Colonoscopy on 11/2/23. Portal message sent.

## 2023-10-31 ENCOUNTER — TELEPHONE (OUTPATIENT)
Dept: ENDOSCOPY | Facility: HOSPITAL | Age: 73
End: 2023-10-31
Payer: MEDICARE

## 2023-10-31 NOTE — TELEPHONE ENCOUNTER
Patient called and stated that he cannot keep his previously confirmed appointment because he cannot fast that long and wants a morning appointment.  Patient rescheduled.    Spoke to patient and wife to schedule procedure(s) Colonoscopy       Physician to perform procedure(s) Dr. JHON Hernandez  Date of Procedure (s) 11/8/23  Arrival Time 9:15 AM  Time of Procedure(s) 10:15 AM   Location of Procedure(s) [address]  Type of Rx Prep sent to patient: Suprep  Instructions provided to patient via Email to [email or web address]    Patient was informed on the following information and verbalized understanding. Screening questionnaire reviewed with patient and complete. If procedure requires anesthesia, a responsible adult needs to be present to accompany the patient home, patient cannot drive after receiving anesthesia. Appointment details are tentative, especially check-in time. Patient will receive a prep-op call 4 days prior to confirm check-in time for procedure. If applicable the patient should contact their pharmacy to verify Rx for procedure prep is ready for pick-up. Patient was advised to call the scheduling department at [phone] if pharmacy states no Rx is available. Patient was advised to call the endoscopy scheduling department if any questions or concerns arise.       Endoscopy Scheduling Department    Instructions were emailed:           Colonoscopy Procedure Prep Instructions        Date of procedure: 11/8/23 Arrive at: 9:15 PM     Location of Department:   [facility] [address], [place] [address]  Take the Elevators to 2nd Floor Endoscopy Procedural Area     As soon as possible:   your prep from pharmacy and over the counter DULCOLAX LAXATIVE TABLETS      On the day before your procedure   What You CAN do:   You may have clear liquids ONLY -see below for list.      Liquids That Are OK to Drink:   Water  Sports drinks (Gatorade, Power-Aid)  Coffee or tea (no cream or  nondairy creamer)  Clear juices without pulp (apple, white grape)  Gelatin desserts (no fruit or toppings)  Clear soda (sprite, coke, ginger ale)  Chicken broth (until 12 midnight the night before procedure)        What You CANNOT do:   Do not EAT solid food, drink milk or anything colored red.  Do not drink alcohol.  Do not take oral medications within 1 hour of starting each dose of SUPREP.  No gum chewing or candy morning of procedure.        Note:   (Please disregard the insert instructions from pharmacy).  SUPREP Bowel Prep Kit is indicated for cleansing of the colon as a preparation for colonoscopy in adults.   Be sure to tell your doctor about all the medicines you take, including prescription and non-prescription medicines, vitamins, and herbal supplements. SUPREP Bowel Prep Kit may affect how other medicines work.  Medication taken by mouth may not be absorbed properly when taken within 1 hour before the start of each dose of SUPREP Bowel Prep Kit.     It is not uncommon to experience some abdominal cramping, nausea and/or vomiting when taking the prep. If you have nausea and/or vomiting while taking the prep, stop drinking for 20 to 30 minutes then continue.     How to take prep:     SUPREP Bowel Prep Kit is a (2-day) prep.   Both 6-ounce bottles are required for a complete preparation for colonoscopy. Dilute the solution concentrate as directed prior to use. You must drink water with each dose of SUPREP, and additional water after each dose.     DOSE 1--Day Before Colonoscopy 11/7/23     Drink at least 6 to 8 glasses of clear liquids from time you wake up until you begin your prep and then continue until bedtime to avoid dehydration.      12:00 pm (NOON) Take four (4) Dulcolax (Bisacodyl) tablets with at least 8 ounces or more of clear liquids.       6:00 pm:     You must complete Steps 1 through 4 using one (1) 6-ounce bottle before going to bed as shown below:     Step 1-Pour ONE (1) 6-ounce bottle of  SUPREP liquid into the mixing container.  Step 2-Add cool drinking water to the 16-ounce line on the container and mix.  Step 3-Drink ALL the liquid in the container.  Step 4-You must drink two (2) more 16-ounce containers of water over the next 1 hour.  IMPORTANT: If you experience preparation-related symptoms (for example, nausea, bloating, or cramping), stop, or slow the rate of drinking the additional water until your symptoms decrease.     DOSE 2--Day of the Colonoscopy 11/8/23 at 2-3 AM     For this dose, repeat Steps 1 through 4 shown above using the other 6-ounce bottle.   You may continue drinking water/clear liquids until 4 hours before your colonoscopy- 6:15AM.     For more information about your procedure, please watch this informational video. It is important to watch this animated consent video prior to your arrival. If you haven't watched the video prior to arriving, you are required to watch it during admission which can cause delays.      Options for viewing:  Using a keyboard:  press and hold the control tab (Ctrl) and left mouse click to follow link          Colonoscopy Instructional Video                                                         OR     Type link address into your web browser's address bar:  [email or web address]     Using a mobile phone: tap on web address/link.            IMPORTANT INFORMATION TO KNOW BEFORE YOUR PROCEDURE     [facility] 2nd Floor     If your procedure requires the administration of anesthesia, it is necessary for a responsible adult to drive you home. (Medical Transportation, Uber, Lyft, Taxi, etc. may ONLY be used if a responsible adult is present to accompany you home.  The responsible adult CAN'T be the  of the service).       person must be available to return to pick you up within 30 minutes of being notified of discharge.      Due to the limited socially distant seating in our waiting room, please  limit your guest (1) who accompany you for this procedure. If someone accompanies you for this procedure into the facility:     Consider having them proceed to an area that is socially distant other than the lobby until a member of the medical team contacts them to provide an update after the procedure.      Also, please consider being dropped off and picked up from the facility.        Please bring a picture ID, insurance card, & copayment     Take Medications as directed below:     If you begin taking any blood thinning medications, please contact the  listed below as soon as possible.     If you are diabetic see the attached instruction sheet regarding your medication.      If you take HEART, BLOOD PRESSURE, SEIZURE, PAIN, LUNG (including inhalers/nebulizers), ANTI-REJECTION (transplant patients), or PSYCHIATRIC medications, please take at your regular times with a sip of water or as directed by the scheduling nurse.      Important contact information:     Endoscopy [phone] Hours of operation Monday-Friday 8:00-4:30pm.     Questions about insurance or financial obligations call [phone] or [phone].     If you have questions regarding the prep or need to reschedule, please call [phone]. After hours questions requiring immediate assistance, contact [facility] On-Call nurse line at [phone] or [phone].   NOTE:      On occasion, unforeseen circumstances may cause a delay in your procedure start time. We respect your time and appreciate your patience during these circumstances.

## 2023-10-31 NOTE — TELEPHONE ENCOUNTER
Received call from pt's wife Izabella to email copy of instructions to [email or web address].  Instructions reviewed, email sent, appt confirmed.          Colonoscopy Procedure Prep Instructions        Date of procedure: 11/2/23 Arrive at: 12:30 PM     Location of Department:   [facility] [address] [place] [address]  Take the Elevators to 2nd Floor Endoscopy Procedural Area     As soon as possible:   your prep from pharmacy and over the counter DULCOLAX LAXATIVE TABLETS      On the day before your procedure   What You CAN do:   You may have clear liquids ONLY -see below for list.      Liquids That Are OK to Drink:   Water  Sports drinks (Gatorade, Power-Aid)  Coffee or tea (no cream or nondairy creamer)  Clear juices without pulp (apple, white grape)  Gelatin desserts (no fruit or toppings)  Clear soda (sprite, coke, ginger ale)  Chicken broth (until 12 midnight the night before procedure)        What You CANNOT do:   Do not EAT solid food, drink milk or anything colored red.  Do not drink alcohol.  Do not take oral medications within 1 hour of starting each dose of SUPREP.  No gum chewing or candy morning of procedure.        Note:   (Please disregard the insert instructions from pharmacy).  SUPREP Bowel Prep Kit is indicated for cleansing of the colon as a preparation for colonoscopy in adults.   Be sure to tell your doctor about all the medicines you take, including prescription and non-prescription medicines, vitamins, and herbal supplements. SUPREP Bowel Prep Kit may affect how other medicines work.  Medication taken by mouth may not be absorbed properly when taken within 1 hour before the start of each dose of SUPREP Bowel Prep Kit.     It is not uncommon to experience some abdominal cramping, nausea and/or vomiting when taking the prep. If you have nausea and/or vomiting while taking the prep, stop drinking for 20 to 30 minutes then continue.     How to take prep:     SUPREP Bowel  Prep Kit is a (2-day) prep.   Both 6-ounce bottles are required for a complete preparation for colonoscopy. Dilute the solution concentrate as directed prior to use. You must drink water with each dose of SUPREP, and additional water after each dose.     DOSE 1--Day Before Colonoscopy 11/1/23     Drink at least 6 to 8 glasses of clear liquids from time you wake up until you begin your prep and then continue until bedtime to avoid dehydration.      12:00 pm (NOON) Take four (4) Dulcolax (Bisacodyl) tablets with at least 8 ounces or more of clear liquids.       6:00 pm:     You must complete Steps 1 through 4 using one (1) 6-ounce bottle before going to bed as shown below:     Step 1-Pour ONE (1) 6-ounce bottle of SUPREP liquid into the mixing container.  Step 2-Add cool drinking water to the 16-ounce line on the container and mix.  Step 3-Drink ALL the liquid in the container.  Step 4-You must drink two (2) more 16-ounce containers of water over the next 1 hour.  IMPORTANT: If you experience preparation-related symptoms (for example, nausea, bloating, or cramping), stop, or slow the rate of drinking the additional water until your symptoms decrease.     DOSE 2--Day of the Colonoscopy 11/2/23 at 7-8 AM     For this dose, repeat Steps 1 through 4 shown above using the other 6-ounce bottle.   You may continue drinking water/clear liquids until 4 hours before your colonoscopy- 9:30AM.     For more information about your procedure, please watch this informational video. It is important to watch this animated consent video prior to your arrival. If you haven't watched the video prior to arriving, you are required to watch it during admission which can cause delays.      Options for viewing:  Using a keyboard:  press and hold the control tab (Ctrl) and left mouse click to follow link          Colonoscopy Instructional Video                                                         OR     Type link address into your web  browser's address bar:  [email or web address].com/[email or web address]     Using a mobile phone: tap on web address/link.            IMPORTANT INFORMATION TO KNOW BEFORE YOUR PROCEDURE     [facility] 2nd Floor     If your procedure requires the administration of anesthesia, it is necessary for a responsible adult to drive you home. (Medical Transportation, Uber, Lyft, Taxi, etc. may ONLY be used if a responsible adult is present to accompany you home.  The responsible adult CAN'T be the  of the service).       person must be available to return to pick you up within 30 minutes of being notified of discharge.      Due to the limited socially distant seating in our waiting room, please limit your guest (1) who accompany you for this procedure. If someone accompanies you for this procedure into the facility:     Consider having them proceed to an area that is socially distant other than the lobby until a member of the medical team contacts them to provide an update after the procedure.      Also, please consider being dropped off and picked up from the facility.        Please bring a picture ID, insurance card, & copayment     Take Medications as directed below:     If you begin taking any blood thinning medications, please contact the  listed below as soon as possible.     If you are diabetic see the attached instruction sheet regarding your medication.      If you take HEART, BLOOD PRESSURE, SEIZURE, PAIN, LUNG (including inhalers/nebulizers), ANTI-REJECTION (transplant patients), or PSYCHIATRIC medications, please take at your regular times with a sip of water or as directed by the scheduling nurse.      Important contact information:     Endoscopy [phone] Hours of operation Monday-Friday 8:00-4:30pm.     Questions about insurance or financial obligations call [phone] or [phone].     If you have questions regarding the prep or need to  reschedule, please call [phone]. After hours questions requiring immediate assistance, contact [facility] nurse line at [phone] or [phone].   NOTE:      On occasion, unforeseen circumstances may cause a delay in your procedure start time. We respect your time and appreciate your patience during these circumstances.

## 2023-11-07 ENCOUNTER — TELEPHONE (OUTPATIENT)
Dept: ENDOSCOPY | Facility: HOSPITAL | Age: 73
End: 2023-11-07
Payer: MEDICARE

## 2024-03-20 ENCOUNTER — OFFICE VISIT (OUTPATIENT)
Dept: VASCULAR SURGERY | Facility: CLINIC | Age: 74
End: 2024-03-20
Payer: MEDICARE

## 2024-03-20 ENCOUNTER — HOSPITAL ENCOUNTER (OUTPATIENT)
Dept: RADIOLOGY | Facility: HOSPITAL | Age: 74
Discharge: HOME | End: 2024-03-20
Attending: SURGERY
Payer: MEDICARE

## 2024-03-20 VITALS
DIASTOLIC BLOOD PRESSURE: 73 MMHG | WEIGHT: 183.19 LBS | SYSTOLIC BLOOD PRESSURE: 122 MMHG | HEIGHT: 71 IN | HEART RATE: 76 BPM | BODY MASS INDEX: 25.65 KG/M2

## 2024-03-20 DIAGNOSIS — I25.10 CORONARY ARTERY DISEASE INVOLVING NATIVE CORONARY ARTERY OF NATIVE HEART WITHOUT ANGINA PECTORIS: ICD-10-CM

## 2024-03-20 DIAGNOSIS — E78.2 MIXED HYPERLIPIDEMIA: Primary | ICD-10-CM

## 2024-03-20 DIAGNOSIS — I71.43 INFRARENAL ABDOMINAL AORTIC ANEURYSM (AAA) WITHOUT RUPTURE: ICD-10-CM

## 2024-03-20 DIAGNOSIS — I71.43 INFRARENAL ABDOMINAL AORTIC ANEURYSM (AAA) WITHOUT RUPTURE: Primary | ICD-10-CM

## 2024-03-20 DIAGNOSIS — I71.40 ABDOMINAL AORTIC ANEURYSM (AAA) WITHOUT RUPTURE, UNSPECIFIED PART: ICD-10-CM

## 2024-03-20 DIAGNOSIS — I10 HYPERTENSION, ESSENTIAL: ICD-10-CM

## 2024-03-20 PROCEDURE — 99215 OFFICE O/P EST HI 40 MIN: CPT | Mod: S$GLB,,, | Performed by: SURGERY

## 2024-03-20 PROCEDURE — 76775 US EXAM ABDO BACK WALL LIM: CPT | Mod: TC

## 2024-03-20 PROCEDURE — 99999 PR PBB SHADOW E&M-EST. PATIENT-LVL III: CPT | Mod: PBBFAC,,, | Performed by: SURGERY

## 2024-03-20 PROCEDURE — 76775 US EXAM ABDO BACK WALL LIM: CPT | Mod: 26,,, | Performed by: RADIOLOGY

## 2024-03-20 RX ORDER — HYDRALAZINE HYDROCHLORIDE 25 MG/1
25 TABLET, FILM COATED ORAL 3 TIMES DAILY
Qty: 90 TABLET | Refills: 1 | Status: SHIPPED | OUTPATIENT
Start: 2024-03-20 | End: 2024-03-22 | Stop reason: SDUPTHER

## 2024-03-20 NOTE — PROGRESS NOTES
Nikolai Bray MD, PhD  [facility]   03/20/2024    HPI:  Antolin Richardson is a 73 y.o. male with a history of abdominal aortic aneurysm (AAA), who was referred by who was referred by Dr Zamora.      S/p EVAR in 2016. Had CT in 2018 , which showed stable repair, no EL, sac measureing 4cm. Device appears to be a Medtronic with suprarenal fixation     Hx of left lower leg swelling discoloration on was draining from his like to back to me it is draining yellow since his fasciotomy    Patient states location is R costal margin occurring since February which started after a Fall on to this area of his ribs when pt was walking on ice.  Associated signs and symptoms non.  Quality is sharp, short shock like,  and severity is 6/10.   Alleviating factors none.  Worsening factors none.  Pain is not radiating and does not radiate to his back.  Denies abdominal pain.   Pain comes and goes infrequently and randomly lasting only asting only a few seconds.      Tobacco use: still actively smoking    3/20/2024: Here for 6 mo follow up. Previous pain at R costal margin resolved. Denies any chest, abdominal, back, flank, UE, or LE pains. Continues to smoke 1 PPD, no reduction. On ASA and statin.  Patient expressed desire to quit smoking.  Reports Chantix has worked for him in the past      Patient Active Problem List   Diagnosis    Anxiety    Hypertension, essential    Current every day smoker    Lung nodule    Abdominal aortic aneurysm    Coronary artery disease involving native coronary artery of native heart without angina pectoris    Mixed hyperlipidemia    Erectile dysfunction due to arterial insufficiency    Tendonitis, Achilles, right    Colon polyps    BPH (benign prostatic hyperplasia)    Benign prostatic hyperplasia    KATELYN (acute kidney injury)    Other emphysema    History of bradycardia    Chest pain, cardiac    Primary insomnia     Past Medical History:   Diagnosis Date    AAA (abdominal aortic  aneurysm) 12/2016    s/p endo repair ([facility])    Acid reflux     Anxiety     Coronary artery disease     Coronary artery disease involving native coronary artery of native heart without angina pectoris 1/3/2017    Hypertension     Myocardial infarction      Past Surgical History:   Procedure Laterality Date    ABDOMINAL AORTIC ANEURYSM REPAIR      APPENDECTOMY      COLONOSCOPY N/A 10/25/2018    Procedure: COLONOSCOPY;  Surgeon: Jackie Linares MD;  Location: [facility] ENDO;  Service: Endoscopy;  Laterality: N/A;    CORONARY ANGIOPLASTY  2009    coronary artery stent      x1    CYSTOSCOPY N/A 10/22/2018    Procedure: CYSTOSCOPY;  Surgeon: Poly Marcum MD;  Location: [facility] OR;  Service: Urology;  Laterality: N/A;    stent for abdominal aneurysm      TRANSURETHRAL RESECTION OF PROSTATE N/A 8/1/2018    Procedure: TRANSURETHRAL RESECTION OF PROSTATE (TURP);  Surgeon: Poly Marcum MD;  Location: [facility] OR;  Service: Urology;  Laterality: N/A;     Family History   Problem Relation Age of Onset    Cancer Mother         brain    Dementia Father         alzheimer's    Hypertension Father      Social History     Socioeconomic History    Marital status:    Tobacco Use    Smoking status: Every Day     Current packs/day: 2.00     Average packs/day: 2.0 packs/day for 20.0 years (40.0 ttl pk-yrs)     Types: Cigarettes    Smokeless tobacco: Never   Substance and Sexual Activity    Alcohol use: Yes     Comment: occasionally    Drug use: No    Sexual activity: Yes     Partners: Female       Current Outpatient Medications:     amLODIPine (NORVASC) 10 MG tablet, Take 1 tablet (10 mg total) by mouth once daily., Disp: 90 tablet, Rfl: 3    aspirin (ECOTRIN) 81 MG EC tablet, Take 1 tablet (81 mg total) by mouth once daily., Disp: 90 tablet, Rfl: 3    atorvastatin (LIPITOR) 40 MG tablet, Take 1 tablet (40 mg total) by mouth every evening., Disp: 90 tablet, Rfl: 3    hydrALAZINE (APRESOLINE) 25 MG tablet, Take 1  "tablet (25 mg total) by mouth 3 (three) times daily., Disp: 90 tablet, Rfl: 1    mupirocin (BACTROBAN) 2 % ointment, Apply topically 3 (three) times daily., Disp: 22 g, Rfl: 0    ondansetron (ZOFRAN-ODT) 4 MG TbDL, Take 1 tablet (4 mg total) by mouth every 8 (eight) hours as needed (nausea/vomiting)., Disp: 12 tablet, Rfl: 0    traZODone (DESYREL) 50 MG tablet, Take 1 tablet (50 mg total) by mouth every evening., Disp: 30 tablet, Rfl: 0    REVIEW OF SYSTEMS:  ROS   As above    VITALS:  Vitals:    03/20/24 0943   BP: 122/73   BP Location: Left arm   Patient Position: Sitting   Pulse: 76   Weight: 83.1 kg (183 lb 3.2 oz)   Height: 5' 11" (1.803 m)        PHYSICAL EXAM:   Physical Exam   - No TTP at abd or back       LAB RESULTS:  No results found for: "CBC"  Lab Results   Component Value Date    LABPROT 10.0 03/10/2014    INR 1.0 03/10/2014     Lab Results   Component Value Date     02/21/2023    K 4.0 02/21/2023     (H) 02/21/2023    CO2 21 (L) 02/21/2023    GLU 95 02/21/2023    BUN 21 02/21/2023    CREATININE 1.2 06/05/2023    CALCIUM 9.0 02/21/2023    ANIONGAP 9 02/21/2023    EGFRNONAA >60.0 10/13/2021     Lab Results   Component Value Date    WBC 9.63 02/21/2023    RBC 5.23 02/21/2023    HGB 15.3 02/21/2023    HCT 45.7 02/21/2023    MCV 87 02/21/2023    MCH 29.3 02/21/2023    MCHC 33.5 02/21/2023    RDW 14.2 02/21/2023     02/21/2023    MPV 9.7 02/21/2023    GRAN 5.6 02/21/2023    GRAN 58.2 02/21/2023    LYMPH 2.6 02/21/2023    LYMPH 27.2 02/21/2023    MONO 1.0 02/21/2023    MONO 10.8 02/21/2023    EOS 0.2 02/21/2023    BASO 0.08 02/21/2023    EOSINOPHIL 2.4 02/21/2023    BASOPHIL 0.8 02/21/2023    DIFFMETHOD Automated 02/21/2023     .  Lab Results   Component Value Date    HGBA1C 5.6 10/16/2020       IMAGING:  All pertinent imaging has been reviewed and interpreted independently.    ABD US 3/20/24:   Prior aorto bi-iliac stent graft placement. Native aneurysm sac measures 4.2 x 3.7 cm, " correlating with the 06/05/2023 CTA exam.    IMP/PLAN:  Antolin Richardson is a 72 y.o. male AAA s/p EVAR (Medtronic) with Dr Chahal 12/12/2016 here for follow-up.  today's US showing stable AAA sac measuring 4.2 x 3.7 cm (6/5/2023 CTA AAA measuring 3.8 cm x 4.2 cm, with no endoleak).  Remains asymptomatic.  Still smoking pack a day but desire to quit.    Plan:    - Smoke cessation referral sent, strongly encouraged quitting smoking consult patient on patient on health benefits of smoking cessation, and risk of continuing smoking  - RTC in 1 yr with noncon abd/pelv CT and carotid US     DESEAN Prather      I spent 45 minutes evaluating this patient and greater than 50% of the time was spent counseling, coordinator care and discussing the plan of care.  All questions were answered and patient stated understanding with agreement with the above treatment plan.    Nikolai Bray MD, PhD  Vascular and Endovascular Surgery

## 2024-03-20 NOTE — TELEPHONE ENCOUNTER
Care Due:                  Date            Visit Type   Department     Provider  --------------------------------------------------------------------------------                                [facility] FAMILY                              PRIMARY      MEDICINE/INTERN  Last Visit: 05-      CARE ([facility])   AL MED         Sayda Cheung  Next Visit: None Scheduled  None         None Found                                                            Last  Test          Frequency    Reason                     Performed    Due Date  --------------------------------------------------------------------------------    CBC.........  12 months..  hydrALAZINE..............  02- 02-    [facility] Embedded Care Due Messages. Reference number: [id number].   3/20/2024 4:07:15 PM CDT

## 2024-03-22 DIAGNOSIS — I10 HYPERTENSION, ESSENTIAL: ICD-10-CM

## 2024-03-22 RX ORDER — HYDRALAZINE HYDROCHLORIDE 25 MG/1
25 TABLET, FILM COATED ORAL 3 TIMES DAILY
Qty: 90 TABLET | Refills: 1 | Status: SHIPPED | OUTPATIENT
Start: 2024-03-22

## 2024-03-22 NOTE — TELEPHONE ENCOUNTER
No care due was identified.  [facility] Embedded Care Due Messages. Reference number: [id number].   3/22/2024 2:34:14 PM CDT

## 2024-05-16 DIAGNOSIS — E78.1 PURE HYPERGLYCERIDEMIA: ICD-10-CM

## 2024-05-16 RX ORDER — ATORVASTATIN CALCIUM 40 MG/1
40 TABLET, FILM COATED ORAL NIGHTLY
Qty: 90 TABLET | Refills: 3 | Status: SHIPPED | OUTPATIENT
Start: 2024-05-16

## 2024-07-01 ENCOUNTER — OFFICE VISIT (OUTPATIENT)
Dept: FAMILY MEDICINE | Facility: CLINIC | Age: 74
End: 2024-07-01
Payer: MEDICARE

## 2024-07-01 ENCOUNTER — LAB VISIT (OUTPATIENT)
Dept: LAB | Facility: HOSPITAL | Age: 74
End: 2024-07-01
Payer: MEDICARE

## 2024-07-01 VITALS
RESPIRATION RATE: 16 BRPM | SYSTOLIC BLOOD PRESSURE: 158 MMHG | HEART RATE: 74 BPM | HEIGHT: 71 IN | WEIGHT: 183.88 LBS | BODY MASS INDEX: 25.74 KG/M2 | TEMPERATURE: 99 F | DIASTOLIC BLOOD PRESSURE: 82 MMHG | OXYGEN SATURATION: 98 %

## 2024-07-01 DIAGNOSIS — J84.10 CALCIFIED GRANULOMA OF LUNG: ICD-10-CM

## 2024-07-01 DIAGNOSIS — Z87.891 PERSONAL HISTORY OF NICOTINE DEPENDENCE: ICD-10-CM

## 2024-07-01 DIAGNOSIS — Z12.11 COLON CANCER SCREENING: ICD-10-CM

## 2024-07-01 DIAGNOSIS — N39.43 BENIGN PROSTATIC HYPERPLASIA WITH POST-VOID DRIBBLING: ICD-10-CM

## 2024-07-01 DIAGNOSIS — I10 HYPERTENSION, ESSENTIAL: ICD-10-CM

## 2024-07-01 DIAGNOSIS — J43.2 CENTRILOBULAR EMPHYSEMA: ICD-10-CM

## 2024-07-01 DIAGNOSIS — I25.10 CORONARY ARTERY DISEASE INVOLVING NATIVE CORONARY ARTERY OF NATIVE HEART WITHOUT ANGINA PECTORIS: ICD-10-CM

## 2024-07-01 DIAGNOSIS — I71.43 INFRARENAL ABDOMINAL AORTIC ANEURYSM (AAA) WITHOUT RUPTURE: Primary | ICD-10-CM

## 2024-07-01 DIAGNOSIS — E78.1 PURE HYPERGLYCERIDEMIA: ICD-10-CM

## 2024-07-01 DIAGNOSIS — I77.810 THORACIC AORTIC ECTASIA: ICD-10-CM

## 2024-07-01 DIAGNOSIS — N40.1 BENIGN PROSTATIC HYPERPLASIA WITH POST-VOID DRIBBLING: ICD-10-CM

## 2024-07-01 DIAGNOSIS — N39.43 POST-VOID DRIBBLING: ICD-10-CM

## 2024-07-01 DIAGNOSIS — Z12.5 PROSTATE CANCER SCREENING: ICD-10-CM

## 2024-07-01 DIAGNOSIS — E78.2 MIXED HYPERLIPIDEMIA: ICD-10-CM

## 2024-07-01 PROBLEM — N40.0 BPH (BENIGN PROSTATIC HYPERPLASIA): Status: RESOLVED | Noted: 2018-03-26 | Resolved: 2024-07-01

## 2024-07-01 PROBLEM — R07.9 CHEST PAIN, CARDIAC: Status: RESOLVED | Noted: 2023-04-04 | Resolved: 2024-07-01

## 2024-07-01 PROBLEM — N17.9 AKI (ACUTE KIDNEY INJURY): Status: RESOLVED | Noted: 2020-10-16 | Resolved: 2024-07-01

## 2024-07-01 PROBLEM — M76.61 TENDONITIS, ACHILLES, RIGHT: Status: RESOLVED | Noted: 2017-10-03 | Resolved: 2024-07-01

## 2024-07-01 PROCEDURE — 4010F ACE/ARB THERAPY RXD/TAKEN: CPT | Mod: CPTII,S$GLB,, | Performed by: NURSE PRACTITIONER

## 2024-07-01 PROCEDURE — 99214 OFFICE O/P EST MOD 30 MIN: CPT | Mod: S$GLB,,, | Performed by: NURSE PRACTITIONER

## 2024-07-01 PROCEDURE — 1159F MED LIST DOCD IN RCRD: CPT | Mod: CPTII,S$GLB,, | Performed by: NURSE PRACTITIONER

## 2024-07-01 PROCEDURE — 3288F FALL RISK ASSESSMENT DOCD: CPT | Mod: CPTII,S$GLB,, | Performed by: NURSE PRACTITIONER

## 2024-07-01 PROCEDURE — 1101F PT FALLS ASSESS-DOCD LE1/YR: CPT | Mod: CPTII,S$GLB,, | Performed by: NURSE PRACTITIONER

## 2024-07-01 PROCEDURE — 3079F DIAST BP 80-89 MM HG: CPT | Mod: CPTII,S$GLB,, | Performed by: NURSE PRACTITIONER

## 2024-07-01 PROCEDURE — 3077F SYST BP >= 140 MM HG: CPT | Mod: CPTII,S$GLB,, | Performed by: NURSE PRACTITIONER

## 2024-07-01 PROCEDURE — 99999 PR PBB SHADOW E&M-EST. PATIENT-LVL IV: CPT | Mod: PBBFAC,,, | Performed by: NURSE PRACTITIONER

## 2024-07-01 PROCEDURE — 3044F HG A1C LEVEL LT 7.0%: CPT | Mod: CPTII,S$GLB,, | Performed by: NURSE PRACTITIONER

## 2024-07-01 PROCEDURE — 3008F BODY MASS INDEX DOCD: CPT | Mod: CPTII,S$GLB,, | Performed by: NURSE PRACTITIONER

## 2024-07-01 PROCEDURE — 1160F RVW MEDS BY RX/DR IN RCRD: CPT | Mod: CPTII,S$GLB,, | Performed by: NURSE PRACTITIONER

## 2024-07-01 RX ORDER — LISINOPRIL 5 MG/1
TABLET ORAL
COMMUNITY
End: 2024-07-01 | Stop reason: CLARIF

## 2024-07-01 RX ORDER — AMLODIPINE BESYLATE 10 MG/1
10 TABLET ORAL DAILY
Qty: 90 TABLET | Refills: 3 | Status: SHIPPED | OUTPATIENT
Start: 2024-07-01

## 2024-07-01 RX ORDER — HYDRALAZINE HYDROCHLORIDE 25 MG/1
25 TABLET, FILM COATED ORAL 3 TIMES DAILY
Qty: 90 TABLET | Refills: 3 | Status: SHIPPED | OUTPATIENT
Start: 2024-07-01

## 2024-07-01 RX ORDER — ATORVASTATIN CALCIUM 40 MG/1
40 TABLET, FILM COATED ORAL DAILY
Qty: 90 TABLET | Refills: 3 | Status: SHIPPED | OUTPATIENT
Start: 2024-07-01

## 2024-07-01 RX ORDER — METOPROLOL SUCCINATE 100 MG/1
TABLET, EXTENDED RELEASE ORAL
COMMUNITY
End: 2024-07-01

## 2024-07-01 RX ORDER — LISINOPRIL 5 MG/1
TABLET ORAL
Status: CANCELLED | OUTPATIENT
Start: 2024-07-01

## 2024-07-01 RX ORDER — TRIAMCINOLONE ACETONIDE 5 MG/G
CREAM TOPICAL
COMMUNITY
End: 2024-07-01

## 2024-07-01 RX ORDER — LOSARTAN POTASSIUM 25 MG/1
25 TABLET ORAL DAILY
Qty: 90 TABLET | Refills: 3 | Status: SHIPPED | OUTPATIENT
Start: 2024-07-01 | End: 2025-07-01

## 2024-07-01 RX ORDER — ATENOLOL 25 MG/1
TABLET ORAL
COMMUNITY
End: 2024-07-01

## 2024-07-01 RX ORDER — AMITRIPTYLINE HYDROCHLORIDE 10 MG/1
TABLET, FILM COATED ORAL
COMMUNITY
End: 2024-07-01

## 2024-07-01 NOTE — PROGRESS NOTES
Health Maintenance Due   Topic     RSV Vaccine (Age 60+ and Pregnant patients) (1 - 1-dose 60+ series) CONSULT WITH PCP    Colorectal Cancer Screening  CONSULT WITH PCP    Pneumococcal Vaccines (Age 65+) (3 of 3 - PPSV23 or PCV20) CONSULT WITH PCP    COVID-19 Vaccine (3 - 2023-24 season) CONSULT WITH PCP    LDCT Lung Screen  CONSULT WITH PCP    Lipid Panel

## 2024-07-02 ENCOUNTER — TELEPHONE (OUTPATIENT)
Dept: FAMILY MEDICINE | Facility: CLINIC | Age: 74
End: 2024-07-02
Payer: MEDICARE

## 2024-07-02 DIAGNOSIS — E78.2 MIXED HYPERLIPIDEMIA: Primary | ICD-10-CM

## 2024-07-02 DIAGNOSIS — E78.1 HYPERTRIGLYCERIDEMIA: ICD-10-CM

## 2024-07-02 LAB
BILIRUB UR QL STRIP: NEGATIVE
CLARITY UR: CLEAR
COLOR UR: YELLOW
GLUCOSE UR QL STRIP: NEGATIVE
HGB UR QL STRIP: NEGATIVE
KETONES UR QL STRIP: NEGATIVE
LEUKOCYTE ESTERASE UR QL STRIP: NEGATIVE
NITRITE UR QL STRIP: NEGATIVE
PH UR STRIP: 6 [PH] (ref 5–8)
PROT UR QL STRIP: ABNORMAL
SP GR UR STRIP: 1.02 (ref 1–1.03)
URN SPEC COLLECT METH UR: ABNORMAL
UROBILINOGEN UR STRIP-ACNC: NEGATIVE EU/DL

## 2024-07-02 RX ORDER — OMEGA-3-ACID ETHYL ESTERS 1 G/1
2 CAPSULE, LIQUID FILLED ORAL 2 TIMES DAILY
Qty: 360 CAPSULE | Refills: 3 | Status: SHIPPED | OUTPATIENT
Start: 2024-07-02 | End: 2025-07-02

## 2024-07-02 RX ORDER — GEMFIBROZIL 600 MG/1
600 TABLET, FILM COATED ORAL
Qty: 180 TABLET | Refills: 3 | Status: SHIPPED | OUTPATIENT
Start: 2024-07-02 | End: 2025-07-02

## 2024-07-02 NOTE — PROGRESS NOTES
"Subjective:      Patient ID: Antolin Richardson is a 73 y.o. male.  New to me but seen previously in clinic by a fellow provider. Pt presents to clinic for medication refill. Has hx of AAA with repair, under the care of vascular surgery, continues to smoke a pack of cigarettes a day, BP elevated today, normally takes amlodipine 10mg, hydralazine 25mg and atorvastatin 40mg daily. He reports doing well and denies any acute complaints today        Review of Systems   Constitutional:  Negative for activity change, appetite change, fatigue, fever and unexpected weight change.   HENT:  Negative for nasal congestion, ear pain, postnasal drip, rhinorrhea, sneezing, sore throat and voice change.    Eyes:  Negative for pain and visual disturbance.   Respiratory:  Negative for cough, chest tightness and shortness of breath.    Cardiovascular:  Negative for chest pain, palpitations, leg swelling and claudication.   Gastrointestinal:  Negative for abdominal pain, change in bowel habit, constipation, diarrhea, nausea and vomiting.   Endocrine: Negative.    Genitourinary:  Negative for difficulty urinating.   Musculoskeletal:  Negative for arthralgias, back pain, gait problem and myalgias.   Integumentary:  Negative for rash.   Allergic/Immunologic: Negative.    Neurological:  Negative for speech difficulty and headaches.   Hematological: Negative.    Psychiatric/Behavioral: Negative.     All other systems reviewed and are negative.        Objective:     Vitals:    07/01/24 1424   BP: (!) 158/82   BP Location: Right arm   Patient Position: Sitting   BP Method: Large (Manual)   Pulse: 74   Resp: 16   Temp: 98.7 °F (37.1 °C)   TempSrc: Oral   SpO2: 98%   Weight: 83.4 kg (183 lb 13.8 oz)   Height: 5' 11" (1.803 m)     Physical Exam  Vitals and nursing note reviewed.   Constitutional:       General: He is not in acute distress.     Appearance: Normal appearance. He is well-developed and well-groomed. He is not ill-appearing.   HENT: "      Head: Normocephalic and atraumatic.      Right Ear: External ear normal.      Left Ear: External ear normal.      Nose: Nose normal.      Mouth/Throat:      Lips: Pink.      Mouth: Mucous membranes are moist.   Eyes:      General: Lids are normal. Vision grossly intact. Gaze aligned appropriately.      Conjunctiva/sclera: Conjunctivae normal.      Pupils: Pupils are equal, round, and reactive to light.   Neck:      Trachea: Phonation normal.   Cardiovascular:      Rate and Rhythm: Normal rate and regular rhythm.      Heart sounds: Normal heart sounds.   Pulmonary:      Effort: Pulmonary effort is normal. No accessory muscle usage or respiratory distress.      Breath sounds: Normal breath sounds and air entry. No wheezing or rales.   Abdominal:      General: Abdomen is flat. Bowel sounds are normal. There is no distension.      Palpations: Abdomen is soft.      Tenderness: There is no abdominal tenderness.   Musculoskeletal:      Cervical back: Neck supple.      Right lower leg: No edema.      Left lower leg: No edema.   Skin:     General: Skin is warm and dry.      Findings: No rash.   Neurological:      General: No focal deficit present.      Mental Status: He is alert and oriented to person, place, and time. Mental status is at baseline.      Sensory: Sensation is intact.      Motor: Motor function is intact.      Coordination: Coordination is intact.      Gait: Gait is intact.   Psychiatric:         Attention and Perception: Attention and perception normal.         Mood and Affect: Mood and affect normal.         Speech: Speech normal.         Behavior: Behavior normal. Behavior is cooperative.         Thought Content: Thought content normal.         Cognition and Memory: Cognition and memory normal.         Judgment: Judgment normal.        Latest Reference Range & Units 07/01/24 15:10 07/02/24 12:22   WBC 3.90 - 12.70 K/uL 9.45    RBC 4.60 - 6.20 M/uL 5.40    Hemoglobin 14.0 - 18.0 g/dL 15.7    Hematocrit  40.0 - 54.0 % 46.7    MCV 82 - 98 fL 87    MCH 27.0 - 31.0 pg 29.1    MCHC 32.0 - 36.0 g/dL 33.6    RDW 11.5 - 14.5 % 13.9    Platelet Count 150 - 450 K/uL 271    MPV 9.2 - 12.9 fL 9.6    Gran % 38.0 - 73.0 % 61.8    Lymph % 18.0 - 48.0 % 25.1    Mono % 4.0 - 15.0 % 9.3    Eos % 0.0 - 8.0 % 2.8    Basophil % 0.0 - 1.9 % 0.7    Immature Granulocytes 0.0 - 0.5 % 0.3    Gran # (ANC) 1.8 - 7.7 K/uL 5.8    Lymph # 1.0 - 4.8 K/uL 2.4    Mono # 0.3 - 1.0 K/uL 0.9    Eos # 0.0 - 0.5 K/uL 0.3    Baso # 0.00 - 0.20 K/uL 0.07    Immature Grans (Abs) 0.00 - 0.04 K/uL 0.03    nRBC 0 /100 WBC 0    Differential Method  Automated    Sodium 136 - 145 mmol/L 139    Potassium 3.5 - 5.1 mmol/L 3.6    Chloride 95 - 110 mmol/L 108    CO2 23 - 29 mmol/L 22 (L)    Anion Gap 8 - 16 mmol/L 9    BUN 8 - 23 mg/dL 17    Creatinine 0.5 - 1.4 mg/dL 1.2    eGFR >60 mL/min/1.73 m^2 >60    Glucose 70 - 110 mg/dL 110    Calcium 8.7 - 10.5 mg/dL 9.5    ALP 55 - 135 U/L 78    PROTEIN TOTAL 6.0 - 8.4 g/dL 8.0    Albumin 3.5 - 5.2 g/dL 3.8    BILIRUBIN TOTAL 0.1 - 1.0 mg/dL 0.4    AST 10 - 40 U/L 18    ALT 10 - 44 U/L 17    Cholesterol Total 120 - 199 mg/dL 155    HDL 40 - 75 mg/dL 29 (L)    HDL/Cholesterol Ratio 20.0 - 50.0 % 18.7 (L)    Non-HDL Cholesterol mg/dL 126    Total Cholesterol/HDL Ratio 2.0 - 5.0  5.3 (H)    Triglycerides 30 - 150 mg/dL 448 (H)    LDL Cholesterol 63.0 - 159.0 mg/dL Invalid, Trig>400.0    Hemoglobin A1C External 4.0 - 5.6 % 5.5    Estimated Avg Glucose 68 - 131 mg/dL 111    TSH 0.400 - 4.000 uIU/mL 0.578    Prostate Specific Antigen 0.00 - 4.00 ng/mL 1.0    Specimen UA   Urine, Clean Catch   Color, UA Yellow, Straw, Nneka   Yellow   Appearance, UA Clear   Clear   Spec Grav UA 1.005 - 1.030   1.020   pH, UA 5.0 - 8.0   6.0   Protein, UA Negative   Trace !   Glucose, UA Negative   Negative   Ketones, UA Negative   Negative   Blood, UA Negative   Negative   NITRITE UA Negative   Negative   UROBILINOGEN UA <2.0 EU/dL  Negative    Bilirubin (UA) Negative   Negative   Leukocyte Esterase, UA Negative   Negative   CULTURE, URINE   Rpt (IP)   (L): Data is abnormally low  (H): Data is abnormally high  !: Data is abnormal  (IP): In Process  Rpt: View report in Results Review for more information  Assessment and Plan:     1. Infrarenal abdominal aortic aneurysm (AAA) without rupture  Uncomplicated and asymptomatic, no chest abdominal or back pain, BP elevated today, discussed current regimen, resume controlled, on statin without anticoagulation, strongly encouraged to stop smoking, f/u with vascular surgeon as scheduled, explicit emergency precautions discussed    2. Thoracic aortic ectasia  Uncomplicated and asymptomatic, no chest abdominal or back pain, BP elevated today, discussed current regimen, resume controlled, on statin without anticoagulation, strongly encouraged to stop smoking, f/u with vascular surgeon as scheduled, explicit emergency precautions discussed    3. Coronary artery disease involving native coronary artery of native heart without angina pectoris  Current treatment plan is effective, no change in therapy.  Reviewed diet, exercise and weight control.  Recommended sodium restriction.  Very strongly urged to quit smoking to reduce cardiovascular risk.  Cardiovascular risk and specific lipid/LDL goals reviewed.  Reviewed medications and side effects in detail.  Reviewed potential future medication changes and side effects.  Use of aspirin to prevent MI and TIA's discussed.  Use and side effects of nitroglycerine reviewed, call 911 if chest pain unrelieved by 3 tablets.  For claudication, exercise to point of pain, rest, then continue.    4. Hypertension, essential  Medication changes per orders.  Dietary sodium restriction.  Regular aerobic exercise.  Stop smoking.  Patient Education: Reviewed risks of hypertension and principles of treatment.  - hydrALAZINE (APRESOLINE) 25 MG tablet; Take 1 tablet (25 mg total) by mouth 3  (three) times daily.  Dispense: 90 tablet; Refill: 3  - amLODIPine (NORVASC) 10 MG tablet; Take 1 tablet (10 mg total) by mouth once daily.  Dispense: 90 tablet; Refill: 3  - losartan (COZAAR) 25 MG tablet; Take 1 tablet (25 mg total) by mouth once daily.  Dispense: 90 tablet; Refill: 3  - CBC Auto Differential; Future  - Comprehensive Metabolic Panel; Future  - Urinalysis; Future  - Urine Culture High Risk; Future    5. Mixed hyperlipidemia  Continue dietary measures.  Continue regular exercise.  Lipid-lowering medications:  continue statin daily .  - atorvastatin (LIPITOR) 40 MG tablet; Take 1 tablet (40 mg total) by mouth once daily.  Dispense: 90 tablet; Refill: 3  - TSH; Future  - Lipid Panel; Future    6. Centrilobular emphysema  Continues to smoke daily with no plans to quit  Discussed distinction between quick-relief and controlled medications.  Discussed medication dosage, use, side effects, and goals of treatment in detail.    Warning signs of respiratory distress were reviewed with the patient.   Discussed avoidance of precipitants.  Discussed technique for using MDIs and/or nebulizer.  Discussed monitoring symptoms and use of quick-relief medications and contacting us early in the course of exacerbations.  - CT Chest Lung Screening Low Dose; Future    7. Calcified granuloma of lung  Continue to smoke a pack of cigarettes daily  - CT Chest Lung Screening Low Dose; Future    8. Personal history of nicotine dependence  Pt with no plans to quit  - CT Chest Lung Screening Low Dose; Future    9. Pure hyperglyceridemia  The patient is asked to make an attempt to improve diet and exercise patterns to aid in medical management of this problem.  - Hemoglobin A1C; Future    10. Benign prostatic hyperplasia with post-void dribbling  Trouble voiding without medication   - PSA, Screening; Future  - Urinalysis; Future  - Urine Culture High Risk; Future    11. Prostate cancer screening  - PSA, Screening; Future    12.  Colon cancer screening  - Ambulatory referral/consult to Endo Procedure ; Future           JOSE RAMON Torre, FNP-C  Family/Internal Medicine  [facility]

## 2024-07-02 NOTE — TELEPHONE ENCOUNTER
Your cholesterol is much higher, prescription for gemfibrozil and high dose fish oil have been sent to your pharmacy for you to begin taking

## 2024-07-02 NOTE — TELEPHONE ENCOUNTER
Spoke with the patient and notified two new medications were ordered to the pharmacy.   He stated understanding.

## 2024-07-04 LAB — BACTERIA UR CULT: NORMAL

## 2024-07-08 ENCOUNTER — TELEPHONE (OUTPATIENT)
Dept: FAMILY MEDICINE | Facility: CLINIC | Age: 74
End: 2024-07-08
Payer: MEDICARE

## 2024-07-08 NOTE — TELEPHONE ENCOUNTER
Spoke with the patient and he has questions on his medications . We  reviewed all medications  and verified the frequency and purpose for the medications. He stated understanding.

## 2024-07-08 NOTE — TELEPHONE ENCOUNTER
----- Message from Pat Root sent at 7/8/2024  3:15 PM CDT -----  Name of Who is Calling: Pt wife Anel is calling on behalf of KRISTAL CASTILLO [[id number]]          What is the request in detail: Pt is requesting a call back  regarding medication concerns. Please assist.           Can the clinic reply by [facility]: No          What Number to Call Back [facility]:  [phone]

## 2024-07-17 ENCOUNTER — OFFICE VISIT (OUTPATIENT)
Dept: URGENT CARE | Facility: CLINIC | Age: 74
End: 2024-07-17
Payer: MEDICARE

## 2024-07-17 VITALS
BODY MASS INDEX: 25.62 KG/M2 | HEIGHT: 71 IN | HEART RATE: 76 BPM | RESPIRATION RATE: 18 BRPM | WEIGHT: 183 LBS | DIASTOLIC BLOOD PRESSURE: 78 MMHG | OXYGEN SATURATION: 95 % | TEMPERATURE: 98 F | SYSTOLIC BLOOD PRESSURE: 134 MMHG

## 2024-07-17 DIAGNOSIS — U07.1 COVID-19: Primary | ICD-10-CM

## 2024-07-17 DIAGNOSIS — U07.1 COVID-19 VIRUS DETECTED: ICD-10-CM

## 2024-07-17 DIAGNOSIS — R05.9 COUGH, UNSPECIFIED TYPE: ICD-10-CM

## 2024-07-17 LAB
CTP QC/QA: YES
SARS-COV-2 AG RESP QL IA.RAPID: POSITIVE

## 2024-07-17 PROCEDURE — 99214 OFFICE O/P EST MOD 30 MIN: CPT | Mod: S$GLB,,, | Performed by: NURSE PRACTITIONER

## 2024-07-17 PROCEDURE — 87811 SARS-COV-2 COVID19 W/OPTIC: CPT | Mod: QW,S$GLB,, | Performed by: NURSE PRACTITIONER

## 2024-07-17 RX ORDER — BENZONATATE 200 MG/1
200 CAPSULE ORAL 3 TIMES DAILY PRN
Qty: 30 CAPSULE | Refills: 0 | Status: SHIPPED | OUTPATIENT
Start: 2024-07-17 | End: 2024-07-27

## 2024-07-17 NOTE — PROGRESS NOTES
"Subjective:      Patient ID: Antolin Richardson is a 73 y.o. male.    Vitals:  height is 5' 11" (1.803 m) and weight is 83 kg (183 lb). His oral temperature is 97.9 °F (36.6 °C). His blood pressure is 134/78 and his pulse is 76. His respiration is 18 and oxygen saturation is 95%.     Chief Complaint: Cough    73 year old male presents with 4 day complaint of cough, and headaches. OTC Tylenol taken with mild relief. Patient wife tested positive for COVID today at PCP office.     Cough  This is a new problem. The current episode started in the past 7 days (4 days). The problem has been unchanged. The cough is Productive of sputum. Associated symptoms include headaches and postnasal drip. Pertinent negatives include no chest pain, chills, ear congestion, ear pain, fever, heartburn, hemoptysis, myalgias, nasal congestion, rash, rhinorrhea, sore throat, shortness of breath, sweats, weight loss or wheezing. Nothing aggravates the symptoms. Treatments tried: Tylonel.       Constitution: Negative for activity change, appetite change, chills, sweating, fatigue, fever, unexpected weight change, generalized weakness and international travel in last 60 days.   HENT:  Positive for postnasal drip. Negative for ear pain and sore throat.    Neck: neck negative.   Cardiovascular: Negative.  Negative for chest pain.   Eyes: Negative.    Respiratory:  Positive for cough. Negative for bloody sputum, shortness of breath and wheezing.    Gastrointestinal:  Negative for heartburn.   Endocrine: negative.   Genitourinary: Negative.    Musculoskeletal:  Negative for muscle ache.   Skin: Negative.  Negative for rash.   Allergic/Immunologic: Negative.    Neurological:  Positive for headaches.   Hematologic/Lymphatic: Negative.    Psychiatric/Behavioral: Negative.        Objective:     Physical Exam   Constitutional: He is oriented to person, place, and time. He appears well-developed. He is cooperative.  Non-toxic appearance. He does not " appear ill. No distress.   HENT:   Head: Normocephalic and atraumatic.   Ears:   Right Ear: Hearing, tympanic membrane, external ear and ear canal normal.   Left Ear: Hearing, tympanic membrane, external ear and ear canal normal.   Nose: Nose normal. No mucosal edema, rhinorrhea or nasal deformity. No epistaxis. Right sinus exhibits no maxillary sinus tenderness and no frontal sinus tenderness. Left sinus exhibits no maxillary sinus tenderness and no frontal sinus tenderness.   Mouth/Throat: Uvula is midline, oropharynx is clear and moist and mucous membranes are normal. No trismus in the jaw. Normal dentition. No uvula swelling. No oropharyngeal exudate, posterior oropharyngeal edema or posterior oropharyngeal erythema.   Eyes: Conjunctivae and lids are normal. No scleral icterus.   Neck: Trachea normal and phonation normal. Neck supple. No edema present. No erythema present. No neck rigidity present.   Cardiovascular: Normal rate, regular rhythm, normal heart sounds and normal pulses.   Pulmonary/Chest: Effort normal and breath sounds normal. No respiratory distress. He has no decreased breath sounds. He has no rhonchi.   Abdominal: Normal appearance.   Musculoskeletal: Normal range of motion.         General: No deformity. Normal range of motion.   Neurological: He is alert and oriented to person, place, and time. He exhibits normal muscle tone. Coordination normal.   Skin: Skin is warm, dry, intact, not diaphoretic and not pale.   Psychiatric: His speech is normal and behavior is normal. Judgment and thought content normal.   Nursing note and vitals reviewed.    Results for orders placed or performed in visit on 07/17/24   SARS Coronavirus 2 Antigen, POCT Manual Read   Result Value Ref Range    SARS Coronavirus 2 Antigen Positive (A) Negative     Acceptable Yes          Assessment:     1. COVID-19    2. Cough, unspecified type        Plan:       COVID-19  -     nirmatrelvir-ritonavir 300 mg (150  mg x 2)-100 mg copackaged tablets (EUA); Take 3 tablets by mouth 2 (two) times daily for 5 days. Each dose contains 2 nirmatrelvir (pink tablets) and 1 ritonavir (white tablet). Take all 3 tablets together  Dispense: 30 tablet; Refill: 0    Cough, unspecified type  -     SARS Coronavirus 2 Antigen, POCT Manual Read  -     benzonatate (TESSALON) 200 MG capsule; Take 1 capsule (200 mg total) by mouth 3 (three) times daily as needed for Cough.  Dispense: 30 capsule; Refill: 0      Patient Instructions   Hold Atorvastatin for 5 days  Follow up with PCP  Recommend oral antihistamine (claritin, zyrtec, allegra,xyzal)) +/- oral decongestant (pseudoephedrine)  for rhinorrhea, steroid nasal spray (flonase) +/- antihistamine nasal spray (azelastine), prescription (promethazine-DM) at night due to drowsiness  /OTC cough medicine (Dayquil/Nyquil/Coricidin HBP® Maximum Strength Cold & Flu Day),  Tylenol (Acetaminophen) and/or Motrin (Ibuprofen) as directed for control of pain and/or fever.      Please drink plenty of fluids.  Please get plenty of rest.  Nasal irrigation with a saline spray or Netti Pot like device per their directions is also recommended.  If you  smoke, please stop smoking.    To help ease a sore throat, you can:  Use a sore throat spray.  Suck on hard candy or throat lozenges.  Gargle with warm saltwater a few times each day. Mix of 1/4 teaspoon (1.25 grams) salt in 8 ounces (240 mL) of warm water.  Use a cool mist humidifier to help you breathe easier.    If you negative (-) for a COVID test today and you are continuing to have symptoms, it is recommended to repeat the test in 48 hours x 3. If you continue to be negative, you may return to school/work once you have improved symptoms and no fever for 24 hours without any medications. This applies to all viral illnesses.     If you were positive (+) for a COVID  test today, you may return to school/work once you have improved symptoms and no fever for 24 hours  without any medications. Please wear a mask for 5 days after symptom onset.       Discussed prescriptions and over-the-counter medicines to help with patient's symptoms:  A steroid nose spray (flonase) and antihistamine nasal spray (azelastine) can help with a stuffy nose. It can also help with drainage down the back of your throat.  An antihistamine (loratadine,zyrtec,allegra, xyzal) can help with itching, sneezing, or runny nose.  An antihistamine eye drop can help with itchy eyes.  A decongestant (pseudoephedrine,  Phenylephrine, oxymetazoline aka afrin nasal spray) can help with a stuffy nose. Take <10 days for congestion and rhinorrhea. Once symptoms improve, proceed with loratadine/zyrtec once a day. These ingredients can keep you up all night, decrease appetite, feel jittery, and raise blood pressure with long term use.  OTC Coricidin can be used for patients with hypertension and palpitations because you cannot use ingredients such as pseudoephedrine and phenylephrine for oral decongestants.  Coricidin HBP Cough & Cold (Chlorpheniramine/Dextromethorphan)  Coricidin HBP Maximum Strength Multi-Symptom Flu  (Acetaminophen,Dextromethorphan, Chlorpheniramine)  Coricidin HBP® Maximum Strength Cold & Flu Day/Night (Acetaminophen,Dextromethorphan, Doxylamine, Guaifenesin)  Coricidin HBP Chest Congestion & Cough (Dextromethorphan + Guaifenesin)    Medications that control cough are suppressants and expectorants. Suppressants are tessalon pearls and dextromethorphan. If you have a productive cough with sputum, you need an expectorant called guaifenesin. Dextromethorphan and Guaifenesin are active ingredients in many OTC cough/cold medications such as Dayquil/Nyquil, Mucinex, and Robitussin Mucus+Chest Congestion.            Common Cold Medicine Ingredients Cheat sheet  Acetaminophen (APAP) -pain reliever/fever reducer  Dextromethorphan - cough suppressant  Guaifenesin - expectorant/thins and loosens  mucus  Phenylephrine - nasal decongestant  Diphenhydramine or Doxylamine succinate - antihistamine, helps you fall asleep  Promethazine or Brompheniramine - Prescription strength antihistamines    These OTC cold medications are safe to use if you do not have high blood pressure (hypertension) or palpitations.  DayQuil and NyQuil - Cough, Cold & Flu Relief LiquiCaps  DayQuil: Acetaminophen, Dextromethorphan, and Phenylephrine   NyQuil: Acetaminophen, Dextromethorphan, and Doxylamine  DayQuil and NyQuil SEVERE Maximum Strength Cough, Cold & Flu Relief LiquiCaps  DAYQUIL: Acetaminophen, Dextromethorphan, Guaifenesin, and Phenylephrine  NYQUIL: Acetaminophen, Dextromethorphan, Doxylamine, and Phenylephrine   Mucinex DM: Guaifenesin,Dextromethorphan  Mucinex Maximum Strength Sinus-Max® Pressure, Pain & Cough Liquid Gels: Acetaminophen/Dextromethorphan/ Guaifenesin/Phenylephrine     For children with cough/cold/flu, recommend these OTC cold medications:  Honey products such as Zarbees Kid's Cough + Mucus Day/Night (2-6 year old)  Zarbees Kid's Cough All-In-One Day/Night (6 to 12 year old)  Mucinex Children's Multi-System Cold (Dextromethorphan/ Guaifenesin/Phenylephrine)  4 to 6 years of age: 5 mL every 4 hours  6 to 12 years of age: 10 mL every 4 hours.  Mucinex Childrens Cold & Flu (Acetaminophen/Dextromethorphan/Guaifenesin/ Phenylephrine)  6 years to under 12 years of age: 10 mL every 4 hours (day/night use)  Mucinex Childrens Cough Mini-Melts  (Dextromethorphan + Guaifenesin)   4 years to under 6 years of age: 1 packet every 4 hours.  6 years to under 12 years of age: 1 to 2 packets every 4 hours.  >12 years of age and over: 2 to 4 packets every 4 hours.    If not allergic, take Tylenol (Acetaminophen) 650 mg to  1 g every 6 hours as needed  and/or Motrin (Ibuprofen) 600 to 800 mg every 6 hours as needed for fever or pain.          Please remember that you have received care at an urgent care today. Urgent cares  are not emergency rooms and are not equipped to handle life threatening emergencies and cannot rule in or out certain medical conditions and you may be released before all of your medical problems are known or treated.     Please arrange follow up with your primary care physician or speciality clinic within 2-5 days if your signs and symptoms have not resolved or worsen.     Patient can call our Referral Hotline at [phone] to make an appointment.      Please return here or go to the Emergency Department for any concerns or worsening of condition.  Signs of infection. These include a fever of 100.4°F (38°C) or higher, chills, cough, more sputum or change in color of sputum.  You are having so much trouble breathing that you can only say one or two words at a time.  You need to sit upright at all times to be able to breathe and or cannot lie down.  You have trouble breathing when talking or sitting still.  You have a fever of 100.4°F (38°C) or higher or chills.  You have chest pain when you cough, have trouble breathing but can still talk in full sentences, or cough up blood.

## 2024-07-17 NOTE — PATIENT INSTRUCTIONS
Hold Atorvastatin for 5 days  Follow up with PCP  Recommend oral antihistamine (claritin, zyrtec, allegra,xyzal)) +/- oral decongestant (pseudoephedrine)  for rhinorrhea, steroid nasal spray (flonase) +/- antihistamine nasal spray (azelastine), prescription (promethazine-DM) at night due to drowsiness  /OTC cough medicine (Dayquil/Nyquil/Coricidin HBP® Maximum Strength Cold & Flu Day),  Tylenol (Acetaminophen) and/or Motrin (Ibuprofen) as directed for control of pain and/or fever.      Please drink plenty of fluids.  Please get plenty of rest.  Nasal irrigation with a saline spray or Netti Pot like device per their directions is also recommended.  If you  smoke, please stop smoking.    To help ease a sore throat, you can:  Use a sore throat spray.  Suck on hard candy or throat lozenges.  Gargle with warm saltwater a few times each day. Mix of 1/4 teaspoon (1.25 grams) salt in 8 ounces (240 mL) of warm water.  Use a cool mist humidifier to help you breathe easier.    If you negative (-) for a COVID test today and you are continuing to have symptoms, it is recommended to repeat the test in 48 hours x 3. If you continue to be negative, you may return to school/work once you have improved symptoms and no fever for 24 hours without any medications. This applies to all viral illnesses.     If you were positive (+) for a COVID  test today, you may return to school/work once you have improved symptoms and no fever for 24 hours without any medications. Please wear a mask for 5 days after symptom onset.       Discussed prescriptions and over-the-counter medicines to help with patient's symptoms:  A steroid nose spray (flonase) and antihistamine nasal spray (azelastine) can help with a stuffy nose. It can also help with drainage down the back of your throat.  An antihistamine (loratadine,zyrtec,allegra, xyzal) can help with itching, sneezing, or runny nose.  An antihistamine eye drop can help with itchy eyes.  A decongestant  (pseudoephedrine,  Phenylephrine, oxymetazoline aka afrin nasal spray) can help with a stuffy nose. Take <10 days for congestion and rhinorrhea. Once symptoms improve, proceed with loratadine/zyrtec once a day. These ingredients can keep you up all night, decrease appetite, feel jittery, and raise blood pressure with long term use.  OTC Coricidin can be used for patients with hypertension and palpitations because you cannot use ingredients such as pseudoephedrine and phenylephrine for oral decongestants.  Coricidin HBP Cough & Cold (Chlorpheniramine/Dextromethorphan)  Coricidin HBP Maximum Strength Multi-Symptom Flu  (Acetaminophen,Dextromethorphan, Chlorpheniramine)  Coricidin HBP® Maximum Strength Cold & Flu Day/Night (Acetaminophen,Dextromethorphan, Doxylamine, Guaifenesin)  Coricidin HBP Chest Congestion & Cough (Dextromethorphan + Guaifenesin)    Medications that control cough are suppressants and expectorants. Suppressants are tessalon pearls and dextromethorphan. If you have a productive cough with sputum, you need an expectorant called guaifenesin. Dextromethorphan and Guaifenesin are active ingredients in many OTC cough/cold medications such as Dayquil/Nyquil, Mucinex, and Robitussin Mucus+Chest Congestion.            Common Cold Medicine Ingredients Cheat sheet  Acetaminophen (APAP) -pain reliever/fever reducer  Dextromethorphan - cough suppressant  Guaifenesin - expectorant/thins and loosens mucus  Phenylephrine - nasal decongestant  Diphenhydramine or Doxylamine succinate - antihistamine, helps you fall asleep  Promethazine or Brompheniramine - Prescription strength antihistamines    These OTC cold medications are safe to use if you do not have high blood pressure (hypertension) or palpitations.  DayQuil and NyQuil - Cough, Cold & Flu Relief LiquiCaps  DayQuil: Acetaminophen, Dextromethorphan, and Phenylephrine   NyQuil: Acetaminophen, Dextromethorphan, and Doxylamine  DayQuil and NyQuil SEVERE Maximum  Strength Cough, Cold & Flu Relief LiquiCaps  DAYQUIL: Acetaminophen, Dextromethorphan, Guaifenesin, and Phenylephrine  NYQUIL: Acetaminophen, Dextromethorphan, Doxylamine, and Phenylephrine   Mucinex DM: Guaifenesin,Dextromethorphan  Mucinex Maximum Strength Sinus-Max® Pressure, Pain & Cough Liquid Gels: Acetaminophen/Dextromethorphan/ Guaifenesin/Phenylephrine     For children with cough/cold/flu, recommend these OTC cold medications:  Honey products such as Zarbees Kid's Cough + Mucus Day/Night (2-6 year old)  Zarbees Kid's Cough All-In-One Day/Night (6 to 12 year old)  Mucinex Children's Multi-System Cold (Dextromethorphan/ Guaifenesin/Phenylephrine)  4 to 6 years of age: 5 mL every 4 hours  6 to 12 years of age: 10 mL every 4 hours.  Mucinex Childrens Cold & Flu (Acetaminophen/Dextromethorphan/Guaifenesin/ Phenylephrine)  6 years to under 12 years of age: 10 mL every 4 hours (day/night use)  Mucinex Childrens Cough Mini-Melts  (Dextromethorphan + Guaifenesin)   4 years to under 6 years of age: 1 packet every 4 hours.  6 years to under 12 years of age: 1 to 2 packets every 4 hours.  >12 years of age and over: 2 to 4 packets every 4 hours.    If not allergic, take Tylenol (Acetaminophen) 650 mg to  1 g every 6 hours as needed  and/or Motrin (Ibuprofen) 600 to 800 mg every 6 hours as needed for fever or pain.          Please remember that you have received care at an urgent care today. Urgent cares are not emergency rooms and are not equipped to handle life threatening emergencies and cannot rule in or out certain medical conditions and you may be released before all of your medical problems are known or treated.     Please arrange follow up with your primary care physician or speciality clinic within 2-5 days if your signs and symptoms have not resolved or worsen.     Patient can call our Referral Hotline at [phone] to make an appointment.      Please return here or go to the Emergency Department for any  concerns or worsening of condition.  Signs of infection. These include a fever of 100.4°F (38°C) or higher, chills, cough, more sputum or change in color of sputum.  You are having so much trouble breathing that you can only say one or two words at a time.  You need to sit upright at all times to be able to breathe and or cannot lie down.  You have trouble breathing when talking or sitting still.  You have a fever of 100.4°F (38°C) or higher or chills.  You have chest pain when you cough, have trouble breathing but can still talk in full sentences, or cough up blood.

## 2024-07-18 ENCOUNTER — NURSE TRIAGE (OUTPATIENT)
Dept: ADMINISTRATIVE | Facility: CLINIC | Age: 74
End: 2024-07-18
Payer: MEDICARE

## 2024-07-18 NOTE — TELEPHONE ENCOUNTER
Pt already spoken to triager will close out chart           Reason for Disposition   Caller has already spoken with another triager and has no further questions    Protocols used: No Contact or Duplicate Contact Call-A-OH

## 2024-07-18 NOTE — TELEPHONE ENCOUNTER
Dx 7/17 seen at . Given RX for Tessalon Pearls and antiviral. States [facility] is out of antiviral and offered no guidance on when it would be in or other locations that may have. Triage done- dispo call back from provider. Care advice discussed.  Advised to call [facility] to see if they can locate RX and to either call  to send in RX or call us back or ask to transfer. Verb understanding.   Reason for Disposition   HIGH RISK patient (e.g., weak immune system, age > 64 years, obesity with BMI of 30 or higher, pregnant, chronic lung disease or other chronic medical condition) and COVID symptoms (e.g., cough, fever)  (Exceptions: Already seen by doctor or NP/PA and no new or worsening symptoms.)    Additional Information   Negative: SEVERE difficulty breathing (e.g., struggling for each breath, speaks in single words)   Negative: Difficult to awaken or acting confused (e.g., disoriented, slurred speech)   Negative: Bluish (or gray) lips or face now   Negative: Shock suspected (e.g., cold/pale/clammy skin, too weak to stand, low BP, rapid pulse)   Negative: Sounds like a life-threatening emergency to the triager   Negative: SEVERE or constant chest pain or pressure  (Exception: Mild central chest pain, present only when coughing.)   Negative: MODERATE difficulty breathing (e.g., speaks in phrases, SOB even at rest, pulse 100-120)   Negative: Headache and stiff neck (can't touch chin to chest)   Negative: Oxygen level (e.g., pulse oximetry) 90% or lower   Negative: Chest pain or pressure  (Exception: MILD central chest pain, present only when coughing.)   Negative: Drinking very little and dehydration suspected (e.g., no urine > 12 hours, very dry mouth, very lightheaded)   Negative: Patient sounds very sick or weak to the triager   Negative: MILD difficulty breathing (e.g., minimal/no SOB at rest, SOB with walking, pulse <100)   Negative: Fever > 103 F (39.4 C)   Negative: Fever > 101 F (38.3 C) and over 60 years  of age   Negative: Fever > 100.0 F (37.8 C) and bedridden (e.g., CVA, chronic illness, recovering from surgery)    Protocols used: Coronavirus (COVID-19) Diagnosed or [id number]

## 2024-08-05 ENCOUNTER — CLINICAL SUPPORT (OUTPATIENT)
Dept: SMOKING CESSATION | Facility: CLINIC | Age: 74
End: 2024-08-05
Payer: COMMERCIAL

## 2024-08-05 DIAGNOSIS — F17.200 NICOTINE DEPENDENCE: Primary | ICD-10-CM

## 2024-08-05 PROCEDURE — 99999 PR PBB SHADOW E&M-EST. PATIENT-LVL I: CPT | Mod: PBBFAC,,,

## 2024-08-05 PROCEDURE — 99404 PREV MED CNSL INDIV APPRX 60: CPT | Mod: S$GLB,,,

## 2024-08-05 RX ORDER — VARENICLINE TARTRATE 0.5 (11)-1
KIT ORAL
Qty: 53 TABLET | Refills: 0 | Status: SHIPPED | OUTPATIENT
Start: 2024-08-05

## 2024-08-06 ENCOUNTER — HOSPITAL ENCOUNTER (OUTPATIENT)
Dept: RADIOLOGY | Facility: HOSPITAL | Age: 74
Discharge: HOME | End: 2024-08-06
Attending: NURSE PRACTITIONER
Payer: MEDICARE

## 2024-08-06 ENCOUNTER — OFFICE VISIT (OUTPATIENT)
Dept: CARDIOLOGY | Facility: CLINIC | Age: 74
End: 2024-08-06
Payer: MEDICARE

## 2024-08-06 VITALS
BODY MASS INDEX: 25.6 KG/M2 | OXYGEN SATURATION: 96 % | SYSTOLIC BLOOD PRESSURE: 116 MMHG | DIASTOLIC BLOOD PRESSURE: 76 MMHG | HEART RATE: 85 BPM | WEIGHT: 182.88 LBS | HEIGHT: 71 IN

## 2024-08-06 DIAGNOSIS — J43.2 CENTRILOBULAR EMPHYSEMA: ICD-10-CM

## 2024-08-06 DIAGNOSIS — I77.810 THORACIC AORTIC ECTASIA: ICD-10-CM

## 2024-08-06 DIAGNOSIS — E78.2 MIXED HYPERLIPIDEMIA: ICD-10-CM

## 2024-08-06 DIAGNOSIS — Z87.898 HISTORY OF BRADYCARDIA: ICD-10-CM

## 2024-08-06 DIAGNOSIS — Z87.891 PERSONAL HISTORY OF NICOTINE DEPENDENCE: ICD-10-CM

## 2024-08-06 DIAGNOSIS — I10 HYPERTENSION, ESSENTIAL: Primary | ICD-10-CM

## 2024-08-06 DIAGNOSIS — J84.10 CALCIFIED GRANULOMA OF LUNG: ICD-10-CM

## 2024-08-06 DIAGNOSIS — E78.1 PURE HYPERGLYCERIDEMIA: ICD-10-CM

## 2024-08-06 DIAGNOSIS — I25.10 CORONARY ARTERY DISEASE INVOLVING NATIVE CORONARY ARTERY OF NATIVE HEART WITHOUT ANGINA PECTORIS: ICD-10-CM

## 2024-08-06 DIAGNOSIS — I71.43 INFRARENAL ABDOMINAL AORTIC ANEURYSM (AAA) WITHOUT RUPTURE: ICD-10-CM

## 2024-08-06 PROCEDURE — 99999 PR PBB SHADOW E&M-EST. PATIENT-LVL III: CPT | Mod: PBBFAC,,, | Performed by: INTERNAL MEDICINE

## 2024-08-06 PROCEDURE — 71271 CT THORAX LUNG CANCER SCR C-: CPT | Mod: TC

## 2024-08-06 PROCEDURE — 3008F BODY MASS INDEX DOCD: CPT | Mod: CPTII,S$GLB,, | Performed by: INTERNAL MEDICINE

## 2024-08-06 PROCEDURE — 4010F ACE/ARB THERAPY RXD/TAKEN: CPT | Mod: CPTII,S$GLB,, | Performed by: INTERNAL MEDICINE

## 2024-08-06 PROCEDURE — 3044F HG A1C LEVEL LT 7.0%: CPT | Mod: CPTII,S$GLB,, | Performed by: INTERNAL MEDICINE

## 2024-08-06 PROCEDURE — 3288F FALL RISK ASSESSMENT DOCD: CPT | Mod: CPTII,S$GLB,, | Performed by: INTERNAL MEDICINE

## 2024-08-06 PROCEDURE — 1126F AMNT PAIN NOTED NONE PRSNT: CPT | Mod: CPTII,S$GLB,, | Performed by: INTERNAL MEDICINE

## 2024-08-06 PROCEDURE — 3078F DIAST BP <80 MM HG: CPT | Mod: CPTII,S$GLB,, | Performed by: INTERNAL MEDICINE

## 2024-08-06 PROCEDURE — 1159F MED LIST DOCD IN RCRD: CPT | Mod: CPTII,S$GLB,, | Performed by: INTERNAL MEDICINE

## 2024-08-06 PROCEDURE — 93000 ELECTROCARDIOGRAM COMPLETE: CPT | Mod: S$GLB,,, | Performed by: INTERNAL MEDICINE

## 2024-08-06 PROCEDURE — 1101F PT FALLS ASSESS-DOCD LE1/YR: CPT | Mod: CPTII,S$GLB,, | Performed by: INTERNAL MEDICINE

## 2024-08-06 PROCEDURE — 3074F SYST BP LT 130 MM HG: CPT | Mod: CPTII,S$GLB,, | Performed by: INTERNAL MEDICINE

## 2024-08-06 PROCEDURE — 99214 OFFICE O/P EST MOD 30 MIN: CPT | Mod: 25,S$GLB,, | Performed by: INTERNAL MEDICINE

## 2024-08-06 PROCEDURE — 1124F ACP DISCUSS-NO DSCNMKR DOCD: CPT | Mod: CPTII,S$GLB,, | Performed by: INTERNAL MEDICINE

## 2024-08-07 ENCOUNTER — PATIENT MESSAGE (OUTPATIENT)
Dept: FAMILY MEDICINE | Facility: CLINIC | Age: 74
End: 2024-08-07
Payer: MEDICARE

## 2024-08-07 DIAGNOSIS — J43.2 CENTRILOBULAR EMPHYSEMA: Primary | ICD-10-CM

## 2024-08-07 LAB
OHS QRS DURATION: 90 MS
OHS QTC CALCULATION: 433 MS

## 2024-08-12 ENCOUNTER — CLINICAL SUPPORT (OUTPATIENT)
Dept: SMOKING CESSATION | Facility: CLINIC | Age: 74
End: 2024-08-12
Payer: COMMERCIAL

## 2024-08-12 DIAGNOSIS — F17.200 NICOTINE DEPENDENCE: Primary | ICD-10-CM

## 2024-08-12 PROCEDURE — 99999 PR PBB SHADOW E&M-EST. PATIENT-LVL I: CPT | Mod: PBBFAC,,,

## 2024-08-12 PROCEDURE — 99404 PREV MED CNSL INDIV APPRX 60: CPT | Mod: S$GLB,,,

## 2024-08-12 NOTE — PROGRESS NOTES
Individual Follow-Up Form    8/12/2024    Quit Date: TBD     Clinical Status of Patient: Outpatient    Length of Service: 60 minutes    Continuing Medication: yes  Chantix    Other Medications: None      Target Symptoms: Withdrawal and medication side effects. The following were  rated moderate (3) to severe (4) on TCRS:  Moderate (3): Crave and Desire   Severe (4): None     Comments: Patient presented to clinic for follow-up visit, name and date of birth verified as two patient identifiers.   Patient reported he is still smoking about 10 CPD, and he began using 1 mg Varenicline without any negative side effects reported at this time.  Counselor congratulated patient on his progress thus far.  Counselor also reviewed packet 2 with patient outlining the benefits on one's health once he achieves tobacco free status.  Follow-up visit scheduled in one weeks.  Counselor will remain available should any further needs arise.      Diagnosis: F17.200    Next Visit: 1 week

## 2024-08-20 ENCOUNTER — CLINICAL SUPPORT (OUTPATIENT)
Dept: SMOKING CESSATION | Facility: CLINIC | Age: 74
End: 2024-08-20
Payer: COMMERCIAL

## 2024-08-20 DIAGNOSIS — F17.200 NICOTINE DEPENDENCE: Primary | ICD-10-CM

## 2024-08-20 PROCEDURE — 99999 PR PBB SHADOW E&M-EST. PATIENT-LVL I: CPT | Mod: PBBFAC,,,

## 2024-08-20 PROCEDURE — 99402 PREV MED CNSL INDIV APPRX 30: CPT | Mod: S$GLB,,,

## 2024-08-20 NOTE — PROGRESS NOTES
Individual Follow-Up Form    8/20/2024    Quit Date: TBD     Clinical Status of Patient: Outpatient    Length of Service: 30 minutes    Continuing Medication: yes  Chantix    Other Medications: None      Target Symptoms: Withdrawal and medication side effects. The following were  rated moderate (3) to severe (4) on TCRS:  Moderate (3): Crave and Desire   Severe (4): None    Comments: Patient presented to clinic for follow-up visit, name and date of birth verified as two patient identifiers.   Patient reported he is still smoking about 1/2 CPD, and he is still using 1 mg Varenicline without any negative side effects reported at this time.  Patient also reported he is planning to be quit prior to next scheduled visit because he realizes he is smoking out of habit.  Counselor congratulated patient on his progress thus far.  Counselor also discussed packet 3 with patient outlining high risk situations and developing a plan for them, understanding urges and cravings, managing emotions, and breathing exercises and meditation techniques.  Follow-up visit scheduled in one week.  Counselor will remain available should any further needs arise.      Diagnosis: F17.200    Next Visit: 1 week

## 2024-08-21 NOTE — PLAN OF CARE
08/01/18 2021   PRE-TX-O2-ETCO2   O2 Device (Oxygen Therapy) nasal cannula   Flow (L/min) 2   SpO2 96 %   Pulse Oximetry Type Intermittent     
Cleared per anesthesia for transfer to floor when criteria met  
Problem: Patient Care Overview  Goal: Plan of Care Review  Outcome: Ongoing (interventions implemented as appropriate)  Patient AAOx4.  VSS.  Has remained afebrile this shift.  CBI completed per orders.  POC reviewed with patient.  Open discussion was facilitated.  Patient verbalized understanding.  Bed in lowest position, side rails up x2, call light within reach, and safety measures maintained throughout shift.  Patient has remained free of falls, trauma, and injury this shift.  Patient denies any needs at this time.  Will continue to monitor.        
Pt prepared for surgery  
Report to [facility]. No complaint of pain or discomfort, Nausea absent. Wife present at bedside                    
2 seconds or less

## 2024-09-04 ENCOUNTER — CLINICAL SUPPORT (OUTPATIENT)
Dept: SMOKING CESSATION | Facility: CLINIC | Age: 74
End: 2024-09-04
Payer: COMMERCIAL

## 2024-09-04 DIAGNOSIS — F17.200 NICOTINE DEPENDENCE: Primary | ICD-10-CM

## 2024-09-04 PROCEDURE — 90853 GROUP PSYCHOTHERAPY: CPT | Mod: S$GLB,,,

## 2024-09-04 PROCEDURE — 99999 PR PBB SHADOW E&M-EST. PATIENT-LVL I: CPT | Mod: PBBFAC,,,

## 2024-09-04 RX ORDER — VARENICLINE TARTRATE 1 MG/1
1 TABLET, FILM COATED ORAL DAILY
Qty: 60 TABLET | Refills: 0 | Status: SHIPPED | OUTPATIENT
Start: 2024-09-04

## 2024-09-04 NOTE — PROGRESS NOTES
[place] [facility] [place]  Date:  9/4/2024  Clinical Status of Patient: Outpatient   Length of Service and Code: 90 minutes - 90022   Number in Attendance: 2  CO Monitor Score: 2 pmm  Group Activities/Focus of Group:  orientation, client introductions, completion of TCRS (Tobacco Cessation Rating Scale) learned addiction model, cues/triggers, personal reasons for quitting, medications, goals, quit date    Target symptoms:  withdrawal and medication side effects             The following were rated moderate (3) to severe (4) on TCRS:       Moderate 3: Crave and Desire      Severe 4:   None   Patient's Response to Intervention: Patient presented to clinic for follow-up visit, name and date of birth verified as two patient identifiers.   Patient reported he quit smoking on 8/22/2024, and he is still using 1 mg Varenicline without any negative side effects reported at this time.  Counselor congratulated patient on achieving tobacco free status.  Counselor discussed strategies to assist patient with remaining tobacco free, and she also discussed patient eliminating known triggers in his home environment.  Follow-up visit scheduled in one week.  Counselor will remain available should any further needs arise.    Progress Toward Goals and Other Mental Status Changes: Patient is progressing without any mental status changes noted.   Diagnosis: Z17.200  Plan: The patient will continue with group therapy sessions and medication regimen prescribed with management by physician or Cessation Clinic Provider. Patient will inform Smoking Clinic Cessation Counselor of symptoms as rated high on TCRS.  Return to Clinic: 1 week

## 2024-09-18 ENCOUNTER — TELEPHONE (OUTPATIENT)
Dept: ENDOSCOPY | Facility: HOSPITAL | Age: 74
End: 2024-09-18

## 2024-09-18 ENCOUNTER — CLINICAL SUPPORT (OUTPATIENT)
Dept: ENDOSCOPY | Facility: HOSPITAL | Age: 74
End: 2024-09-18
Payer: MEDICARE

## 2024-09-18 VITALS — BODY MASS INDEX: 25.62 KG/M2 | HEIGHT: 71 IN | WEIGHT: 183 LBS

## 2024-09-18 DIAGNOSIS — Z12.11 COLON CANCER SCREENING: ICD-10-CM

## 2024-09-18 RX ORDER — SODIUM, POTASSIUM,MAG SULFATES 17.5-3.13G
1 SOLUTION, RECONSTITUTED, ORAL ORAL DAILY
Qty: 1 KIT | Refills: 0 | Status: SHIPPED | OUTPATIENT
Start: 2024-09-18 | End: 2024-09-20

## 2024-09-18 NOTE — TELEPHONE ENCOUNTER
Spoke to pt to schedule procedure(s) Colonoscopy       Physician to perform procedure(s) Dr. ANGI Alvarez  Date of Procedure (s) 10/19/24  Arrival Time 7:30 AM  Time of Procedure(s) 8:30 AM   Location of Procedure(s) [address]   Type of Rx Prep sent to patient: Suprep  Instructions provided to patient via [facility]    Patient was informed on the following information and verbalized understanding. Screening questionnaire reviewed with patient and complete. If procedure requires anesthesia, a responsible adult needs to be present to accompany the patient home, patient cannot drive after receiving anesthesia. Appointment details are tentative, especially check-in time. Patient will receive a prep-op call 7 days prior to confirm check-in time for procedure. If applicable the patient should contact their pharmacy to verify Rx for procedure prep is ready for pick-up. Patient was advised to call the scheduling department at [phone] if pharmacy states no Rx is available. Patient was advised to call the endoscopy scheduling department if any questions or concerns arise.      [facility] Scheduling Department

## 2024-09-19 ENCOUNTER — CLINICAL SUPPORT (OUTPATIENT)
Dept: SMOKING CESSATION | Facility: CLINIC | Age: 74
End: 2024-09-19
Payer: COMMERCIAL

## 2024-09-19 DIAGNOSIS — F17.200 NICOTINE DEPENDENCE: Primary | ICD-10-CM

## 2024-09-19 PROCEDURE — 99999 PR PBB SHADOW E&M-EST. PATIENT-LVL I: CPT | Mod: PBBFAC,,,

## 2024-09-19 PROCEDURE — 99401 PREV MED CNSL INDIV APPRX 15: CPT | Mod: S$GLB,,,

## 2024-09-19 NOTE — PROGRESS NOTES
Individual Follow-Up Form    9/19/2024    Quit Date: 8/22/2024    Clinical Status of Patient: Outpatient    Length of Service: 15 minutes    Continuing Medication: yes  Chantix    Other Medications: None      Target Symptoms: Withdrawal and medication side effects. The following were  rated moderate (3) to severe (4) on TCRS:  Moderate (3): Crave and Desire   Severe (4): None     Comments: Counselor spoke with patient telephonically for follow-up visit, name and date of birth verified as two patient identifiers.   Patient reported he remains tobacco free, and he is still using 1 mg Varenicline without any negative side effects reported at this time.  Counselor congratulated patient on his continual effort to remain tobacco free.  Counselor discussed strategies to assist patient with remaining tobacco free.  Follow-up visit scheduled in one week.  Counselor will remain available should any further needs arise.       Diagnosis: F17.200    Next Visit: 1 week

## 2024-09-23 ENCOUNTER — CLINICAL SUPPORT (OUTPATIENT)
Dept: SMOKING CESSATION | Facility: CLINIC | Age: 74
End: 2024-09-23
Payer: COMMERCIAL

## 2024-09-23 DIAGNOSIS — F17.200 NICOTINE DEPENDENCE: Primary | ICD-10-CM

## 2024-09-23 PROCEDURE — 99999 PR PBB SHADOW E&M-EST. PATIENT-LVL I: CPT | Mod: PBBFAC,,,

## 2024-09-23 PROCEDURE — 99403 PREV MED CNSL INDIV APPRX 45: CPT | Mod: S$GLB,,,

## 2024-09-23 NOTE — PROGRESS NOTES
Individual Follow-Up Form    9/23/2024    Quit Date: 8/22/2024    Clinical Status of Patient: Outpatient    Length of Service: 45 minutes    Continuing Medication: no    Other Medications: None      Target Symptoms: Withdrawal and medication side effects. The following were  rated moderate (3) to severe (4) on TCRS:  Moderate (3): Crave and Desire   Severe (4): None     Comments: Patient presented to clinic for follow-up visit, name and date of birth verified as two patient identifiers.   Patient reported he remains tobacco free, and he is no longer using 1 mg Varenicline.  Counselor congratulated patient on his continual effort to remain tobacco free.  Counselor reiterated the purpose and benefits of consistent NRT usage, patient verbalized understanding.  Counselor also discussed strategies to assist patient with remaining tobacco free.  Follow-up visit scheduled in one week.  Counselor will remain available should any further needs arise.       Diagnosis: F17.200    Next Visit: 2 weeks

## 2024-09-30 ENCOUNTER — CLINICAL SUPPORT (OUTPATIENT)
Dept: SMOKING CESSATION | Facility: CLINIC | Age: 74
End: 2024-09-30
Payer: COMMERCIAL

## 2024-09-30 DIAGNOSIS — F17.200 NICOTINE DEPENDENCE: Primary | ICD-10-CM

## 2024-09-30 PROCEDURE — 99403 PREV MED CNSL INDIV APPRX 45: CPT | Mod: S$GLB,,,

## 2024-09-30 PROCEDURE — 99999 PR PBB SHADOW E&M-EST. PATIENT-LVL I: CPT | Mod: PBBFAC,,,

## 2024-09-30 NOTE — PROGRESS NOTES
Individual Follow-Up Form    9/30/2024    Quit Date: 8/22/2024    Clinical Status of Patient: Outpatient    Length of Service: 45 minutes    Continuing Medication: yes  Chantix    Other Medications: None      Target Symptoms: Withdrawal and medication side effects. The following were  rated moderate (3) to severe (4) on TCRS:  Moderate (3): Crave and Desire   Severe (4): None     Comments: Patient presented to clinic for follow-up visit, name and date of birth verified as two patient identifiers.   Patient reported he remains tobacco free, and he resumed using 1 mg Varenicline due to increased cravings since last scheduled visit.  Counselor congratulated patient on his continual effort to remain tobacco free.  Counselor discussed strategies to aid patient with working through cravings, she reiterated the purpose and benefits of consistent NRT usage, and she also reviewed strategies learned to aid patient with remaining tobacco free.  Follow-up visit scheduled in one week.  Counselor will remain available should any further needs arise.       Diagnosis: F17.200    Next Visit: 2 weeks

## 2024-10-02 ENCOUNTER — CLINICAL SUPPORT (OUTPATIENT)
Dept: SMOKING CESSATION | Facility: CLINIC | Age: 74
End: 2024-10-02
Payer: COMMERCIAL

## 2024-10-02 DIAGNOSIS — F17.200 NICOTINE DEPENDENCE: Primary | ICD-10-CM

## 2024-10-02 PROCEDURE — 99999 PR PBB SHADOW E&M-EST. PATIENT-LVL I: CPT | Mod: PBBFAC,,,

## 2024-10-02 PROCEDURE — 90853 GROUP PSYCHOTHERAPY: CPT | Mod: S$GLB,,,

## 2024-10-02 NOTE — PROGRESS NOTES
[place] [facility] [place]  Date:  10/2/2024  Clinical Status of Patient: Outpatient   Length of Service and Code: 90 minutes - 61764   Number in Attendance: 3  Group Activities/Focus of Group:  orientation, client introductions, completion of TCRS (Tobacco Cessation Rating Scale) learned addiction model, cues/triggers, personal reasons for quitting, medications, goals, quit date    Target symptoms:  withdrawal and medication side effects             The following were rated moderate (3) to severe (4) on TCRS:       Moderate 3: Crave and Desire      Severe 4:   None   Patient's Response to Intervention: Patient presented to clinic for follow-up visit, name and date of birth verified as two patient identifiers.   Patient reported he remains tobacco free, and he is using 1 mg Varenicline without any negative side effects reported at this time.  Counselor congratulated patient on his continual effort to remain tobacco free.  Counselor reviewed strategies learned to aid patient with remaining tobacco free.  Follow-up visit scheduled in one week.  Counselor will remain available should any further needs arise.     Progress Toward Goals and Other Mental Status Changes: Patient is progressing without any mental status changes noted.  Diagnosis: Z17.200  Plan: The patient will continue with group therapy sessions and medication regimen prescribed with management by physician or Cessation Clinic Provider. Patient will inform Smoking Clinic Cessation Counselor of symptoms as rated high on TCRS.  Return to Clinic: 1 week

## 2024-10-08 ENCOUNTER — CLINICAL SUPPORT (OUTPATIENT)
Dept: SMOKING CESSATION | Facility: CLINIC | Age: 74
End: 2024-10-08
Payer: COMMERCIAL

## 2024-10-08 DIAGNOSIS — F17.200 NICOTINE DEPENDENCE: Primary | ICD-10-CM

## 2024-10-08 PROCEDURE — 99403 PREV MED CNSL INDIV APPRX 45: CPT | Mod: S$GLB,,,

## 2024-10-08 PROCEDURE — 99999 PR PBB SHADOW E&M-EST. PATIENT-LVL I: CPT | Mod: PBBFAC,,,

## 2024-10-08 NOTE — PROGRESS NOTES
Individual Follow-Up Form    10/8/2024    Quit Date: 8/22/2024    Clinical Status of Patient: Outpatient    Length of Service: 45 minutes    Continuing Medication: yes  Chantix    Other Medications: None      Target Symptoms: Withdrawal and medication side effects. The following were  rated moderate (3) to severe (4) on TCRS:  Moderate (3): Crave and Desire   Severe (4): None     Comments: Patient presented to clinic for follow-up visit, name and date of birth verified as two patient identifiers.   Patient reported he remains tobacco free, and he is still using 1 mg Varenicline with no adverse reactions noted at this time.  Counselor congratulated patient on his continual effort to remain tobacco free.  Counselor also reviewed strategies learned to aid patient with remaining tobacco free.  Follow-up visit scheduled in one week.  Counselor will remain available should any further needs arise.     Diagnosis: F17.200    Next Visit: 1 week

## 2024-10-09 ENCOUNTER — CLINICAL SUPPORT (OUTPATIENT)
Dept: SMOKING CESSATION | Facility: CLINIC | Age: 74
End: 2024-10-09
Payer: COMMERCIAL

## 2024-10-09 DIAGNOSIS — F17.200 NICOTINE DEPENDENCE: Primary | ICD-10-CM

## 2024-10-09 PROCEDURE — 90853 GROUP PSYCHOTHERAPY: CPT | Mod: S$GLB,,,

## 2024-10-09 PROCEDURE — 99999 PR PBB SHADOW E&M-EST. PATIENT-LVL I: CPT | Mod: PBBFAC,,,

## 2024-10-09 NOTE — PROGRESS NOTES
[place] [facility] [place]  Date:  10/9/2024  Clinical Status of Patient: Outpatient   Length of Service and Code: 90 minutes - 97219   Number in Attendance: 5  CO Monitor Score: 1 pmm  Group Activities/Focus of Group:  orientation, client introductions, completion of TCRS (Tobacco Cessation Rating Scale) learned addiction model, cues/triggers, personal reasons for quitting, medications, goals, quit date    Target symptoms:  withdrawal and medication side effects             The following were rated moderate (3) to severe (4) on TCRS:       Moderate 3: Crave and Desire     Severe 4:   None  Patient's Response to Intervention: Patient presented to clinic for follow-up visit, name and date of birth verified as two patient identifiers.   Patient reported he remains tobacco free, and he is using 1 mg Varenicline without any negative side effects reported at this time.  Counselor congratulated patient on his continual effort to remain tobacco free.  Counselor reviewed strategies learned to aid patient with remaining tobacco free.  Follow-up visit scheduled in one week.  Counselor will remain available should any further needs arise.     Progress Toward Goals and Other Mental Status Changes: Patient is progressing without any mental changes noted.    Diagnosis: Z17.200  Plan: The patient will continue with group therapy sessions and medication regimen prescribed with management by physician or Cessation Clinic Provider. Patient will inform Smoking Clinic Cessation Counselor of symptoms as rated high on TCRS.  Return to Clinic: 1 week

## 2024-10-11 ENCOUNTER — TELEPHONE (OUTPATIENT)
Dept: ENDOSCOPY | Facility: HOSPITAL | Age: 74
End: 2024-10-11
Payer: MEDICARE

## 2024-10-11 NOTE — TELEPHONE ENCOUNTER
Spoke to pt for pre-call to confirm scheduled Colonoscopy and patient verbalized understanding of the following:       Date of Procedure (s)  verified 10/19/24  Arrival Time 7:40 AM verified.  Location of Procedure(s) [address]  verified.  NPO status reinforced. Ok to continue clear liquids up until 4 hours prior to the Endoscopy procedure.   patient denies taking blood thinning or glp1 medications  Pt confirmed receipt of prep instructions and Rx prep (if applicable).  Instructions provided to patient via Email  Pt confirmed ride home after procedure if procedure requires anesthesia.   Pre-call screening questionnaire reviewed and completed with patient.   Appointment details are tentative, including check-in time.  If the patient begins taking any blood thinning medications, injectable weight loss/diabetes medications (other than insulin), or Adipex (phentermine) patient was instructed to contact the endoscopy scheduling department as soon as possible.  Patient was advised to call the endoscopy scheduling department if any questions or concerns arise.       [facility] Scheduling Department

## 2024-10-16 ENCOUNTER — HOSPITAL ENCOUNTER (EMERGENCY)
Facility: HOSPITAL | Age: 74
Discharge: HOME | End: 2024-10-16
Attending: EMERGENCY MEDICINE
Payer: MEDICARE

## 2024-10-16 VITALS
OXYGEN SATURATION: 95 % | BODY MASS INDEX: 25.9 KG/M2 | HEIGHT: 71 IN | SYSTOLIC BLOOD PRESSURE: 182 MMHG | HEART RATE: 62 BPM | DIASTOLIC BLOOD PRESSURE: 91 MMHG | WEIGHT: 185 LBS | RESPIRATION RATE: 20 BRPM

## 2024-10-16 DIAGNOSIS — S93.401A SPRAIN OF RIGHT ANKLE, UNSPECIFIED LIGAMENT, INITIAL ENCOUNTER: Primary | ICD-10-CM

## 2024-10-16 DIAGNOSIS — M25.571 RIGHT ANKLE PAIN: ICD-10-CM

## 2024-10-16 PROCEDURE — 99284 EMERGENCY DEPT VISIT MOD MDM: CPT | Mod: 25

## 2024-10-16 RX ORDER — LIDOCAINE 50 MG/G
1 PATCH TOPICAL DAILY
Qty: 15 PATCH | Refills: 0 | Status: SHIPPED | OUTPATIENT
Start: 2024-10-16

## 2024-10-16 RX ORDER — ACETAMINOPHEN 500 MG
500 TABLET ORAL EVERY 6 HOURS PRN
Qty: 30 TABLET | Refills: 0 | Status: SHIPPED | OUTPATIENT
Start: 2024-10-16

## 2024-10-17 ENCOUNTER — PATIENT OUTREACH (OUTPATIENT)
Dept: EMERGENCY MEDICINE | Facility: HOSPITAL | Age: 74
End: 2024-10-17
Payer: MEDICARE

## 2024-10-17 NOTE — PROGRESS NOTES
Patient visited the ED on 10/16/2024.  Patient declined Post ED 7 day PCP, Sayda Cheung MD, follow up appointment or resources at this time.  ED Navigator to close encounter.

## 2024-10-17 NOTE — ED PROVIDER NOTES
Encounter Date: 10/16/2024    SCRIBE #1 NOTE: I, Maurakathi Lora, am scribing for, and in the presence of,  Marco Sparks PA-C.       History     Chief Complaint   Patient presents with    Ankle Pain     Pt presents to ER with complaints of right ankle pain that started on yesterday. Pt rates pain 10/10 and denies taking any meds. Pt denies any falls or hitting ankle. Pt denies chest pain, SOB and any other issues at this time.      73 year old male with PMHx of AAA, GERD, CAD, HTN, MI presents to the ED with right ankle pain since yesterday. He reports he was getting out of his truck  yesterday when he started having right ankle pain and swelling. He reports his pain is worse when walking. He does not recall a recent injury or rolling his ankle. No other exacerbating or alleviating factors. Denies numbness, weakness, nausea, vomiting, rash, wound, or other associated symptoms.     The history is provided by the patient. No  was used.     Review of patient's allergies indicates:  No Known Allergies  Past Medical History:   Diagnosis Date    AAA (abdominal aortic aneurysm) 12/2016    s/p endo repair ([facility])    Acid reflux     Anxiety     Coronary artery disease     Coronary artery disease involving native coronary artery of native heart without angina pectoris 1/3/2017    Hypertension     Myocardial infarction      Past Surgical History:   Procedure Laterality Date    ABDOMINAL AORTIC ANEURYSM REPAIR      APPENDECTOMY      COLONOSCOPY N/A 10/25/2018    Procedure: COLONOSCOPY;  Surgeon: Jackie Linares MD;  Location: [facility] ENDO;  Service: Endoscopy;  Laterality: N/A;    CORONARY ANGIOPLASTY  2009    coronary artery stent      x1    CYSTOSCOPY N/A 10/22/2018    Procedure: CYSTOSCOPY;  Surgeon: Poly Marcum MD;  Location: [facility] OR;  Service: Urology;  Laterality: N/A;    stent for abdominal aneurysm      TRANSURETHRAL RESECTION OF PROSTATE N/A 8/1/2018    Procedure: TRANSURETHRAL  RESECTION OF PROSTATE (TURP);  Surgeon: Poly Marcum MD;  Location: [facility];  Service: Urology;  Laterality: N/A;     Family History   Problem Relation Name Age of Onset    Cancer Mother          brain    Dementia Father          alzheimer's    Hypertension Father       Social History     Tobacco Use    Smoking status: Former     Current packs/day: 0.00     Average packs/day: 2.0 packs/day for 20.0 years (40.0 ttl pk-yrs)     Types: Cigarettes     Start date: 2004     Quit date: 2024     Years since quittin.1    Smokeless tobacco: Never   Substance Use Topics    Alcohol use: Yes     Comment: occasionally    Drug use: No     Review of Systems   Constitutional:  Negative for chills, diaphoresis, fatigue and fever.   HENT:  Negative for congestion, ear discharge, ear pain, rhinorrhea, sore throat and trouble swallowing.    Eyes:  Negative for photophobia, redness and visual disturbance.   Respiratory:  Negative for cough and shortness of breath.    Cardiovascular:  Negative for chest pain, palpitations and leg swelling.   Gastrointestinal:  Negative for abdominal pain, diarrhea, nausea and vomiting.   Genitourinary:  Negative for difficulty urinating, dysuria, flank pain, frequency and hematuria.   Musculoskeletal:  Positive for arthralgias (right ankle) and joint swelling. Negative for back pain, myalgias (ankle), neck pain and neck stiffness.   Skin:  Negative for pallor and rash.   Neurological:  Negative for dizziness, weakness, light-headedness, numbness and headaches.   Hematological:  Does not bruise/bleed easily.       Physical Exam     Initial Vitals [10/16/24 1843]   BP Pulse Resp Temp SpO2   (!) 182/91 62 20 -- 95 %      MAP       --         Physical Exam    Nursing note and vitals reviewed.  Constitutional: He appears well-developed and well-nourished. He is not diaphoretic. He does not appear ill. No distress.   HENT:   Head: Normocephalic and atraumatic.   Right Ear: External ear  normal.   Left Ear: External ear normal.   Nose: Nose normal. Mouth/Throat: Uvula is midline and oropharynx is clear and moist.   Eyes: Conjunctivae and EOM are normal. Pupils are equal, round, and reactive to light. Right eye exhibits no discharge. Left eye exhibits no discharge. No scleral icterus.   Neck: Trachea normal. Neck supple.   Normal range of motion.   Full passive range of motion without pain.     Cardiovascular:  Normal rate, regular rhythm, normal heart sounds, intact distal pulses and normal pulses.     Exam reveals no distant heart sounds and no friction rub.       Pulmonary/Chest: Effort normal and breath sounds normal. No respiratory distress.   Abdominal: Abdomen is soft. Bowel sounds are normal. He exhibits no distension and no pulsatile midline mass. There is no abdominal tenderness.   No right CVA tenderness.  No left CVA tenderness. There is no rebound and no guarding.   Musculoskeletal:         General: Normal range of motion.      Right shoulder: Normal.      Left shoulder: Normal.      Right elbow: Normal.      Left elbow: Normal.      Right wrist: Normal.      Left wrist: Normal.      Right hand: Normal.      Left hand: Normal.      Cervical back: Normal, full passive range of motion without pain, normal range of motion and neck supple.      Thoracic back: Normal.      Lumbar back: Normal.      Right hip: Normal.      Left hip: Normal.      Right knee: Normal.      Left knee: Normal.      Right lower leg: Normal. No swelling. No edema.      Left lower leg: Normal.      Right ankle: No swelling, deformity, ecchymosis or lacerations. Tenderness present over the ATF ligament. Normal range of motion. Anterior drawer test negative. Normal pulse.      Right Achilles Tendon: Normal.      Left ankle: Normal.      Right foot: Normal.      Left foot: Normal.      Comments: Focused exam of the right lower extremity shows full range of motion of the 5/5 strength in his resistance 2+ distal pulses  neurovascularly intact.  There was mild swelling over the anterior talofibular ligament region with tenderness.  No overlying skin changes.  There was no calf swelling edema or tenderness.  Negative Homans sign.     Neurological: He is alert and oriented to person, place, and time. He has normal strength. No cranial nerve deficit or sensory deficit. Coordination and gait normal.   Skin: Skin is warm and dry. Capillary refill takes less than 2 seconds. No bruising, no ecchymosis and no rash noted. No erythema.   Psychiatric: He has a normal mood and affect. His speech is normal and behavior is normal. Thought content normal.         ED Course   Procedures  Labs Reviewed - No data to display       Imaging Results              X-Ray Ankle Complete Right (Final result)  Result time 10/16/24 19:24:15      Final result by Jian Erazo MD (10/16/24 19:24:15)                   Impression:      No acute displaced fracture seen.      Electronically signed by: Jian Erazo MD  Date:    10/16/2024  Time:    19:24               Narrative:    EXAMINATION:  XR ANKLE COMPLETE 3 VIEW RIGHT    CLINICAL HISTORY:  Pain in right ankle and joints of right foot    TECHNIQUE:  AP, lateral, and oblique images of the right ankle were performed.    COMPARISON:  September 2017.    FINDINGS:  No evidence of acute displaced fracture, dislocation, or osseous destructive process.  Ankle mortise is maintained.  There is posterior and plantar calcaneal spurring.  Mild soft tissue swelling is seen over the lateral malleolus.                                       Medications - No data to display  Medical Decision Making  73 year old male with PMHx of AAA, GERD, CAD, HTN, MI presents to the ED with right ankle pain since yesterday. He reports he was getting out of his truck  yesterday when he started having right ankle pain and swelling. He reports his pain is worse when walking. He does not recall a recent injury or rolling his ankle. No other  exacerbating or alleviating factors. Denies numbness, weakness, nausea, vomiting, rash, wound, or other associated symptoms.       Patient's chart and medical history reviewed.  Patient's vitals reviewed.  They are afebrile, no respiratory distress, nontoxic-appearing in the ED.  Differential diagnosis is considered the following.  - Septic Arthritis, Gout, Osteomyelitis: considered with pain, although unlikely without overlying erythema, patient is afebrile  - Fracture/Dislocation: considered with pain, imaging ordered for further evaluation  - Contusion/Sprain/Strain: considered with pain with ROM   - Compartment Syndrome: unlikely with 2+ distal pulses, no pallor, no paresthesias, no woody induration.   - DVT: unlikely presentation. Wells score 0.  Discussed RICE therapy. Walking boot provided for stability. Patient is weight bearing.  At this time I'll discharge home to follow up with primary care physician in the next 1-2 days for further evaluation.  If the pain continues the pt will need to see ortho for further evaluation.  The patient is comfortable with this plan and comfortable going home at this time. After taking into careful account the historical factors and physical exam findings of the patient's presentation today, in conjunction with the empirical and objective data obtained on ED workup, no acute emergent medical condition has been identified. The patient appears to be low risk for an emergent medical condition and I feel it is safe and appropriate at this time for the patient to be discharged to follow-up as detailed in their discharge instructions for reevaluation and possible continued outpatient workup and management. I have discussed the specifics of the workup with the patient and the patient has verbalized understanding of the details of the workup, the diagnosis, the treatment plan, and the need for outpatient follow-up.  Although the patient has no emergent etiology today this does not  preclude the development of an emergent condition so in addition, I have advised the patient that they can return to the ED and/or activate EMS at any time with worsening of their symptoms, change of their symptoms, or with any other medical complaint.  The patient remained comfortable and stable during their visit in the ED.  Discharge and follow-up instructions discussed with the patient who expressed understanding and willingness to comply with my recommendations. I discussed with the patient/family the diagnosis, treatment plan, indications for return to the emergency department, and for expected follow-up. Please follow up with your primary doctor in 1-2 days and return to the ED in any new, worsening, or continued symptoms. The patient/family verbalized an understanding. The patient/family is asked if there are any questions or concerns. We discuss the case, until all issues are addressed to the patient/family's satisfaction. Patient/family understands and is agreeable to the plan.    JADON HOWE PA-C    DISCLAIMER: This note was prepared with [employer] voice recognition transcription software. Garbled syntax, mangled pronouns, and other bizarre constructions may be attributed to that software system.      Amount and/or Complexity of Data Reviewed  Radiology: ordered. Decision-making details documented in ED Course.    Risk  OTC drugs.  Prescription drug management.      Additional MDM:     Well's Criteria Score:  -Clinical symptoms of DVT (leg swelling, pain with palpation) = 0.0  -PE is #1 diagnosis OR equally likely =            0.0  -Heart Rate >100 =   0.0  -Immobilization (= or > than 3 days) or surgery in the previous 4 weeks = 0.0  -Previous DVT/PE = 0.0  -Hemoptysis =          0.0  -Malignancy =           0.0  Well's Probability Score =    0           Scribe Attestation:   Scribe #1: I performed the above scribed service and the documentation accurately describes the services I performed. I attest to  the accuracy of the note.                           I, Marco Sparks PA-C, personally performed the services described in this documentation. All medical record entries made by the scribe were at my direction and in my presence. I have reviewed the chart and agree that the record reflects my personal performance and is accurate and complete.      DISCLAIMER: This note was prepared with [employer] voice recognition transcription software. Garbled syntax, mangled pronouns, and other bizarre constructions may be attributed to that software system.      Clinical Impression:  Final diagnoses:  [M25.571] Right ankle pain  [S93.401A] Sprain of right ankle, unspecified ligament, initial encounter (Primary)          ED Disposition Condition    Discharge Stable          ED Prescriptions       Medication Sig Dispense Start Date End Date Auth. Provider    LIDOcaine (LIDODERM) 5 % Place 1 patch onto the skin once daily. Apply patch for 12 hours and then leave off for 12 hours 15 patch 10/16/2024 -- Marco Sparks PA-C    acetaminophen (TYLENOL) 500 MG tablet Take 1 tablet (500 mg total) by mouth every 6 (six) hours as needed for Pain. 30 tablet 10/16/2024 -- Marco Sparks PA-C          Follow-up Information       Follow up With Specialties Details Why Contact Info    Sayda Cheung MD Internal Medicine, Wound Care Schedule an appointment as soon as possible for a visit in 1 day for follow up [address] [place] [address]  [address]  [place] [address]  [phone]      [facility] - Orthopedic Surgery, Physical Therapy Schedule an appointment as soon as possible for a visit  If symptoms worsen, As needed, for follow up [address]  [facility] [address]  [phone]      [facility] Emergency Dept Emergency Medicine Go to  If symptoms worsen [address] [facility] [place] [address] [phone]             Marco Sparks PA-C  10/16/24 7991

## 2024-10-17 NOTE — DISCHARGE INSTRUCTIONS
Thank you for coming to our Emergency Department today. It is important to remember that some problems or medical conditions are difficult to diagnose and may not be found or addressed during your Emergency Department visit.  These conditions often start with non-specific symptoms and can only be diagnosed on follow up visits with your primary care physician or specialist when the symptoms continue or change. Please remember that all medical conditions can change, and we cannot predict how you will be feeling tomorrow or the next day. Return to the ER with any questions/concerns, new/concerning symptoms including fever, chest pain, shortness of breath, loss of consciousness, dizziness, weakness, worsening symptoms, failure to improve, or any other concerns. Also, please follow up with your Primary Care Physician and/or Pediatrician in the next 1-2 days to review your ED visit in entirety and for re-evaluation.   Be sure to follow up with your primary care doctor and review all labs/imaging/tests that were performed during your ER visit with them. It is very common for us to identify non-emergent incidental findings which must be followed up with your primary care physician.  Some labs/imaging/tests may be outside of the normal range, and require non-emergent follow-up and/or further investigation/treatment/procedures/testing to help diagnose/exclude/prevent complications or other potentially serious medical conditions. Some abnormalities may not have been discussed or addressed during your ER visit. Some lab results may not return during your ER visit but can be accessible by downloading the free [facility]to or by visiting [email or web address] . It is important for you to review all labs/imaging/tests which are outside of the normal range with your physician.  An ER visit does not replace a primary care visit, and many screening tests or follow-up tests cannot be ordered by an ER doctor or performed by  the ER. Some tests may even require pre-approval.  If you do not have a primary care doctor, you may contact the one listed on your discharge paperwork or you may also call the [facility] Appointment Desk at [phone] , or [address] [place] [address] at  [phone] to schedule an appointment, or establish care with a primary care doctor or even a specialist and to obtain information about local resources. It is important to your health that you have a primary care doctor.  Please take all medications as directed. We have done our best to select a medication for you that will treat your condition however, all medications may potentially have side-effects and it is impossible to predict which medications may give you side-effects or what those side-effects (if any) those medications may give you.  If you feel that you are having a negative effect or side-effect of any medication you should stop taking those medications immediately and seek medical attention. If you feel that you are having a life-threatening reaction call 911.  Do not drive, swim, climb to height, take a bath, operate heavy machinery, drink alcohol or take potentially sedating medications, sign any legal documents or make any important decisions for 24 hours if you have received any pain medications, sedatives or mood altering drugs during your ER visit or within 24 hours of taking them if they have been prescribed to you.   You can find additional resources for Dentists, hearing aids, durable medical equipment, low cost pharmacies and other resources at [email or web address]  Patient agrees with this plan. Discussed with her strict return precautions, they verbalized understanding. Patient is stable for discharge.   § Please take all medication as prescribed.

## 2024-10-18 ENCOUNTER — ANESTHESIA EVENT (OUTPATIENT)
Dept: ENDOSCOPY | Facility: HOSPITAL | Age: 74
End: 2024-10-18
Payer: MEDICARE

## 2024-10-19 ENCOUNTER — ANESTHESIA (OUTPATIENT)
Dept: ENDOSCOPY | Facility: HOSPITAL | Age: 74
End: 2024-10-19
Payer: MEDICARE

## 2024-10-19 ENCOUNTER — HOSPITAL ENCOUNTER (OUTPATIENT)
Facility: HOSPITAL | Age: 74
Discharge: HOME | End: 2024-10-19
Attending: STUDENT IN AN ORGANIZED HEALTH CARE EDUCATION/TRAINING PROGRAM | Admitting: STUDENT IN AN ORGANIZED HEALTH CARE EDUCATION/TRAINING PROGRAM
Payer: MEDICARE

## 2024-10-19 VITALS
TEMPERATURE: 98 F | HEIGHT: 71 IN | RESPIRATION RATE: 16 BRPM | OXYGEN SATURATION: 98 % | BODY MASS INDEX: 26.04 KG/M2 | HEART RATE: 51 BPM | SYSTOLIC BLOOD PRESSURE: 135 MMHG | WEIGHT: 186 LBS | DIASTOLIC BLOOD PRESSURE: 89 MMHG

## 2024-10-19 DIAGNOSIS — Z12.11 SCREEN FOR COLON CANCER: ICD-10-CM

## 2024-10-19 PROCEDURE — 88305 TISSUE EXAM BY PATHOLOGIST: CPT | Mod: 59 | Performed by: PATHOLOGY

## 2024-10-19 PROCEDURE — 63600175 PHARM REV CODE 636 W HCPCS

## 2024-10-19 PROCEDURE — 27201089 HC SNARE, DISP (ANY): Performed by: STUDENT IN AN ORGANIZED HEALTH CARE EDUCATION/TRAINING PROGRAM

## 2024-10-19 PROCEDURE — 37000008 HC ANESTHESIA 1ST 15 MINUTES: Performed by: STUDENT IN AN ORGANIZED HEALTH CARE EDUCATION/TRAINING PROGRAM

## 2024-10-19 PROCEDURE — 45385 COLONOSCOPY W/LESION REMOVAL: CPT | Mod: PT | Performed by: STUDENT IN AN ORGANIZED HEALTH CARE EDUCATION/TRAINING PROGRAM

## 2024-10-19 PROCEDURE — 88305 TISSUE EXAM BY PATHOLOGIST: CPT | Mod: 26,,, | Performed by: PATHOLOGY

## 2024-10-19 PROCEDURE — D9220A PRA ANESTHESIA: Mod: PT,ANES,, | Performed by: ANESTHESIOLOGY

## 2024-10-19 PROCEDURE — D9220A PRA ANESTHESIA: Mod: PT,CRNA,,

## 2024-10-19 PROCEDURE — 37000009 HC ANESTHESIA EA ADD 15 MINS: Performed by: STUDENT IN AN ORGANIZED HEALTH CARE EDUCATION/TRAINING PROGRAM

## 2024-10-19 PROCEDURE — 25000003 PHARM REV CODE 250

## 2024-10-19 PROCEDURE — 45385 COLONOSCOPY W/LESION REMOVAL: CPT | Mod: PT,,, | Performed by: STUDENT IN AN ORGANIZED HEALTH CARE EDUCATION/TRAINING PROGRAM

## 2024-10-19 RX ORDER — LIDOCAINE HYDROCHLORIDE 20 MG/ML
INJECTION INTRAVENOUS
Status: DISCONTINUED | OUTPATIENT
Start: 2024-10-19 | End: 2024-10-19

## 2024-10-19 RX ORDER — PROPOFOL 10 MG/ML
VIAL (ML) INTRAVENOUS
Status: DISCONTINUED
Start: 2024-10-19 | End: 2024-10-19 | Stop reason: HOSPADM

## 2024-10-19 RX ORDER — LIDOCAINE HYDROCHLORIDE 20 MG/ML
INJECTION, SOLUTION EPIDURAL; INFILTRATION; INTRACAUDAL; PERINEURAL
Status: DISCONTINUED
Start: 2024-10-19 | End: 2024-10-19 | Stop reason: HOSPADM

## 2024-10-19 RX ORDER — PROPOFOL 10 MG/ML
VIAL (ML) INTRAVENOUS
Status: DISCONTINUED | OUTPATIENT
Start: 2024-10-19 | End: 2024-10-19

## 2024-10-19 RX ORDER — GLYCOPYRROLATE 0.2 MG/ML
INJECTION INTRAMUSCULAR; INTRAVENOUS
Status: DISCONTINUED
Start: 2024-10-19 | End: 2024-10-19 | Stop reason: HOSPADM

## 2024-10-19 RX ADMIN — PROPOFOL 20 MG: 10 INJECTION, EMULSION INTRAVENOUS at 08:10

## 2024-10-19 RX ADMIN — LIDOCAINE HYDROCHLORIDE 60 MG: 20 INJECTION, SOLUTION INTRAVENOUS at 08:10

## 2024-10-19 RX ADMIN — SODIUM CHLORIDE: 0.9 INJECTION, SOLUTION INTRAVENOUS at 08:10

## 2024-10-19 RX ADMIN — PROPOFOL 80 MG: 10 INJECTION, EMULSION INTRAVENOUS at 08:10

## 2024-10-19 RX ADMIN — GLYCOPYRROLATE 0.2 MG: 0.2 INJECTION, SOLUTION INTRAMUSCULAR; INTRAVITREAL at 08:10

## 2024-10-19 NOTE — ANESTHESIA PREPROCEDURE EVALUATION
Past Medical History:   Diagnosis Date    AAA (abdominal aortic aneurysm) 12/2016    s/p endo repair ([facility])    Acid reflux     Anxiety     Coronary artery disease     Coronary artery disease involving native coronary artery of native heart without angina pectoris 1/3/2017    Hypertension     Myocardial infarction                                                                                                                   10/19/2024  Antolin Richardson is a 73 y.o., male.      Pre-op Assessment          Review of Systems  Social:  Former Smoker       Cardiovascular:     Hypertension  Past MI CAD                    Coronary Artery Disease:          Hx of Myocardial Infarction                  Hypertension         Pulmonary:   COPD         Chronic Obstructive Pulmonary Disease (COPD):                      Hepatic/GI:     GERD         Gerd              Physical Exam  General: Well nourished    Airway:  Mallampati: II   Mouth Opening: Normal  Tongue: Normal  Neck ROM: Normal ROM    Dental:  Intact    Chest/Lungs:  Clear to auscultation    Heart:  Rate: Normal  Rhythm: Regular Rhythm  Sounds: Normal      Anesthesia Plan  Type of Anesthesia, risks & benefits discussed:    Anesthesia Type: Gen Natural Airway  Intra-op Monitoring Plan: Standard ASA Monitors  Induction:  IV  Informed Consent: Informed consent signed with the Patient and all parties understand the risks and agree with anesthesia plan.  All questions answered.   ASA Score: 3    Ready For Surgery From Anesthesia Perspective.     .

## 2024-10-19 NOTE — ANESTHESIA POSTPROCEDURE EVALUATION
Anesthesia Post Evaluation    Patient: Antolin Richardson    Procedure(s) Performed: Procedure(s) (LRB):  COLONOSCOPY (N/A)    Final Anesthesia Type: general      Patient location during evaluation: GI PACU  Patient participation: Yes- Able to Participate  Level of consciousness: awake and alert, oriented and awake  Post-procedure vital signs: reviewed and stable  Airway patency: patent    PONV status at discharge: No PONV  Anesthetic complications: no      Cardiovascular status: blood pressure returned to baseline  Respiratory status: unassisted, spontaneous ventilation and room air  Hydration status: euvolemic  Follow-up not needed.              Vitals Value Taken Time   /89 10/19/24 0909   Temp 36.8 °C (98.2 °F) 10/19/24 0854   Pulse 51 10/19/24 0909   Resp 16 10/19/24 0909   SpO2 98 % 10/19/24 0909         Event Time   Out of Recovery 09:19:00         Pain/Dinesh Score: Dinesh Score: 10 (10/19/2024  8:50 AM)

## 2024-10-19 NOTE — PLAN OF CARE
Procedure and recovery complete. Patient awake and alert. Dr. Winn at bedside, procedure findings and suggestions discussed. Discharge instructions given, patient verbalized understanding of instructions. Gait steady able to ambulate without assistance. Patient walked out accompanied by wife.

## 2024-10-19 NOTE — H&P
Short Stay Endoscopy History and Physical    PCP - Sayda Cheung MD    Procedure - Colonoscopy  ASA - per anesthesia  Mallampati - per anesthesia  History of Anesthesia problems - no  Family history Anesthesia problems - no   Plan of anesthesia - per anesthesia    HPI:  This is a 73 y.o. male here for evaluation of : personal history of colon polyps      ROS:  Constitutional: No fevers, chills, No weight loss  CV: No chest pain  Pulm: No cough, No shortness of breath  GI: see HPI  Derm: No rash    Medical History:  has a past medical history of AAA (abdominal aortic aneurysm) (12/2016), Acid reflux, Anxiety, Coronary artery disease, Coronary artery disease involving native coronary artery of native heart without angina pectoris (1/3/2017), Hypertension, and Myocardial infarction.    Surgical History:  has a past surgical history that includes Appendectomy; Coronary angioplasty (2009); Abdominal aortic aneurysm repair; coronary artery stent; stent for abdominal aneurysm; Transurethral resection of prostate (N/A, 8/1/2018); Cystoscopy (N/A, 10/22/2018); and Colonoscopy (N/A, 10/25/2018).    Family History: family history includes Cancer in his mother; Dementia in his father; Hypertension in his father.. Otherwise no colon cancer, inflammatory bowel disease, or GI malignancies.    Social History:  reports that he quit smoking about 8 weeks ago. His smoking use included cigarettes. He started smoking about 20 years ago. He has a 40 pack-year smoking history. He has never used smokeless tobacco. He reports current alcohol use. He reports that he does not use drugs.    Review of patient's allergies indicates:  No Known Allergies    Medications:   Medications Prior to Admission   Medication Sig Dispense Refill Last Dose/Taking    acetaminophen (TYLENOL) 500 MG tablet Take 1 tablet (500 mg total) by mouth every 6 (six) hours as needed for Pain. 30 tablet 0     amLODIPine (NORVASC) 10 MG tablet Take 1 tablet (10 mg  total) by mouth once daily. 90 tablet 3     aspirin (ECOTRIN) 81 MG EC tablet Take 1 tablet (81 mg total) by mouth once daily. 90 tablet 3     atorvastatin (LIPITOR) 40 MG tablet Take 1 tablet (40 mg total) by mouth once daily. 90 tablet 3     gemfibroziL (LOPID) 600 MG tablet Take 1 tablet (600 mg total) by mouth 2 (two) times daily before meals. 180 tablet 3     hydrALAZINE (APRESOLINE) 25 MG tablet Take 1 tablet (25 mg total) by mouth 3 (three) times daily. (Patient taking differently: Take 25 mg by mouth 3 (three) times daily. Pt stated taking BID.) 90 tablet 3     LIDOcaine (LIDODERM) 5 % Place 1 patch onto the skin once daily. Apply patch for 12 hours and then leave off for 12 hours 15 patch 0     losartan (COZAAR) 25 MG tablet Take 1 tablet (25 mg total) by mouth once daily. 90 tablet 3     omega-3 acid ethyl esters (LOVAZA) 1 gram capsule Take 2 capsules (2 g total) by mouth 2 (two) times daily. 360 capsule 3     varenicline (CHANTIX) 1 mg Tab Take 1 tablet (1 mg total) by mouth once daily. 60 tablet 0          Physical Exam:    Vital Signs:   Vitals:    10/19/24 0751   BP: (!) 147/82   Pulse: (!) 57   Resp: 18   Temp: 97.9 °F (36.6 °C)       General Appearance: Well appearing, no acute distress  Eyes:    No scleral icterus  ENT: Neck supple, Lips, mucosa, and tongue normal; teeth and gums normal  Lungs: Pulmonary effort is normal. No respiratory distress.   Heart:  Bradycardic, regular rhythm  Abdomen: Soft, non tender, non distended.  Extremities: 2+ pulses, no clubbing, cyanosis or edema  Skin: No rash      Labs:  Lab Results   Component Value Date    WBC 9.45 07/01/2024    HGB 15.7 07/01/2024    HCT 46.7 07/01/2024     07/01/2024    CHOL 155 07/01/2024    TRIG 448 (H) 07/01/2024    HDL 29 (L) 07/01/2024    ALT 17 07/01/2024    AST 18 07/01/2024     07/01/2024    K 3.6 07/01/2024     07/01/2024    CREATININE 1.2 07/01/2024    BUN 17 07/01/2024    CO2 22 (L) 07/01/2024    TSH 0.578  07/01/2024    PSA 1.0 07/01/2024    INR 1.0 03/10/2014    HGBA1C 5.5 07/01/2024       I have explained the risks and benefits of endoscopy procedures to the patient including but not limited to bleeding, perforation, infection, and death.  The patient was asked if they understand and allowed to ask any further questions to their satisfaction.    Steve Winn MD

## 2024-10-19 NOTE — PROVATION PATIENT INSTRUCTIONS
Discharge Summary/Instructions after an Endoscopic Procedure  Patient Name: Antolin Richardson  Patient MRN: [id number]  Patient YOB: 1950 Saturday, October 19, 2024  Steve Winn MD  Dear patient,  As a result of recent federal legislation (The Federal Cures Act), you may   receive lab or pathology results from your procedure in your [facility]   account before your physician is able to contact you. Your physician or   their representative will relay the results to you with their   recommendations at their soonest availability.  Thank you,  RESTRICTIONS:  During your procedure today, you received medications for sedation.  These   medications may affect your judgment, balance and coordination.  Therefore,   for 24 hours, you have the following restrictions:   - DO NOT drive a car, operate machinery, make legal/financial decisions,   sign important papers or drink alcohol.    ACTIVITY:  Today: no heavy lifting, straining or running due to procedural   sedation/anesthesia.  The following day: return to full activity including work.  DIET:  Eat and drink normally unless instructed otherwise.     TREATMENT FOR COMMON SIDE EFFECTS:  - Mild abdominal pain, nausea, belching, bloating or excessive gas:  rest,   eat lightly and use a heating pad.  - Sore Throat: treat with throat lozenges and/or gargle with warm salt   water.  - Because air was used during the procedure, expelling large amounts of air   from your rectum or belching is normal.  - If a bowel prep was taken, you may not have a bowel movement for 1-3 days.    This is normal.  SYMPTOMS TO WATCH FOR AND REPORT TO YOUR PHYSICIAN:  1. Abdominal pain or bloating, other than gas cramps.  2. Chest pain.  3. Back pain.  4. Signs of infection such as: chills or fever occurring within 24 hours   after the procedure.  5. Rectal bleeding, which would show as bright red, maroon, or black stools.   (A tablespoon of blood from the rectum is not serious, especially  if   hemorrhoids are present.)  6. Vomiting.  7. Weakness or dizziness.  GO DIRECTLY TO THE NEAREST EMERGENCY ROOM IF YOU HAVE ANY OF THE FOLLOWING:      Difficulty breathing              Chills and/or fever over 101 F   Persistent vomiting and/or vomiting blood   Severe abdominal pain   Severe chest pain   Black, tarry stools   Bleeding- more than one tablespoon   Any other symptom or condition that you feel may need urgent attention  Your doctor recommends these additional instructions:  If any biopsies were taken, your doctors clinic will contact you in 1 to 2   weeks with any results.  - The patient will be observed post-procedure, until all discharge criteria   are met.   - Discharge patient to home.   - Resume previous diet.   - Continue present medications.   - Patient has a contact number available for emergencies.  The signs and   symptoms of potential delayed complications were discussed with the   patient.  Return to normal activities tomorrow.  Written discharge   instructions were provided to the patient.   - Repeat colonoscopy in 3 years for surveillance.  For questions, problems or results please call your physician - Steve Winn MD at Work:  [phone], Work:  [phone].  [facility] can be reached at [phone]     IF A COMPLICATION OR EMERGENCY SITUATION ARISES AND YOU ARE UNABLE TO REACH   YOUR PHYSICIAN - GO DIRECTLY TO THE EMERGENCY ROOM.  MD Steve Tejeda MD  10/19/2024 8:48:03 AM  This report has been verified and signed electronically.  Dear patient,  As a result of recent federal legislation (The Federal Cures Act), you may   receive lab or pathology results from your procedure in your [facility]   account before your physician is able to contact you. Your physician or   their representative will relay the results to you with their   recommendations at their soonest availability.  Thank you,  PROVATION

## 2024-10-19 NOTE — TRANSFER OF CARE
"Anesthesia Transfer of Care Note    Patient: Antolin Richardson    Procedure(s) Performed: Procedure(s) (LRB):  COLONOSCOPY (N/A)    Patient location: GI    Anesthesia Type: general    Transport from OR: Transported from OR on room air with adequate spontaneous ventilation    Post pain: adequate analgesia    Post assessment: no apparent anesthetic complications and tolerated procedure well    Post vital signs: stable    Level of consciousness: lethargic and responds to stimulation    Nausea/Vomiting: no nausea/vomiting    Complications: none    Transfer of care protocol was followed      Last vitals: Visit Vitals  /65 (BP Location: Left arm, Patient Position: Lying)   Pulse (!) 59   Temp 36.4 °C (97.5 °F) (Oral)   Resp 12   Ht 5' 11" (1.803 m)   Wt 84.4 kg (186 lb)   SpO2 95%   BMI 25.94 kg/m²     "

## 2024-10-22 ENCOUNTER — CLINICAL SUPPORT (OUTPATIENT)
Dept: SMOKING CESSATION | Facility: CLINIC | Age: 74
End: 2024-10-22
Payer: COMMERCIAL

## 2024-10-22 DIAGNOSIS — F17.200 NICOTINE DEPENDENCE: Primary | ICD-10-CM

## 2024-10-22 PROCEDURE — 99403 PREV MED CNSL INDIV APPRX 45: CPT | Mod: S$GLB,,,

## 2024-10-22 PROCEDURE — 99999 PR PBB SHADOW E&M-EST. PATIENT-LVL I: CPT | Mod: PBBFAC,,,

## 2024-10-22 RX ORDER — VARENICLINE TARTRATE 1 MG/1
1 TABLET, FILM COATED ORAL DAILY
Qty: 60 TABLET | Refills: 0 | Status: SHIPPED | OUTPATIENT
Start: 2024-10-22

## 2024-10-22 NOTE — PROGRESS NOTES
Individual Follow-Up Form    10/22/2024    Quit Date: 8/22/2024    Clinical Status of Patient: Outpatient    Length of Service: 45 minutes    Continuing Medication: no    Other Medications: None      Target Symptoms: Withdrawal and medication side effects. The following were  rated moderate (3) to severe (4) on TCRS:  Moderate (3): Crave and Desire   Severe (4): None     Comments:  Patient presented to clinic for follow-up visit, name and date of birth verified as two patient identifiers.   Patient reported he remains tobacco free, and he is using 1 mg Varenicline without any negative side effects reported at this time.  Patient also reported his cravings are minimal and he continues to crate boundaries with other smokers to assist him with remaining tobacco free.  Counselor congratulated patient on his continual effort to remain tobacco free., and she also reviewed strategies learned to aid patient with remaining tobacco free.  Follow-up visit scheduled in one week.  Counselor will remain available should any further needs arise.       Diagnosis: F17.200    Next Visit: 1 week

## 2024-10-23 ENCOUNTER — CLINICAL SUPPORT (OUTPATIENT)
Dept: SMOKING CESSATION | Facility: CLINIC | Age: 74
End: 2024-10-23
Payer: COMMERCIAL

## 2024-10-23 DIAGNOSIS — F17.200 NICOTINE DEPENDENCE: Primary | ICD-10-CM

## 2024-10-23 LAB
FINAL PATHOLOGIC DIAGNOSIS: NORMAL
GROSS: NORMAL
Lab: NORMAL

## 2024-10-23 PROCEDURE — 99999 PR PBB SHADOW E&M-EST. PATIENT-LVL I: CPT | Mod: PBBFAC,,,

## 2024-10-23 PROCEDURE — 90853 GROUP PSYCHOTHERAPY: CPT | Mod: S$GLB,,,

## 2024-10-23 NOTE — PROGRESS NOTES
Site: [facility] [place]  Date:  10/23/2024  Clinical Status of Patient: Outpatient   Length of Service and Code: 90 minutes - 58701   Number in Attendance: 3  CO Monitor Score: 2 pmm  Group Activities/Focus of Group:  orientation, client introductions, completion of TCRS (Tobacco Cessation Rating Scale) learned addiction model, cues/triggers, personal reasons for quitting, medications, goals, quit date    Target symptoms:  withdrawal and medication side effects             The following were rated moderate (3) to severe (4) on TCRS:       Moderate 3: Crave and Desire      Severe 4:   None   Patient's Response to Intervention: Patient presented to clinic for group visit, name and date of birth verified as two patient identifiers. Patient reported he remains tobacco free, and he is using 1 mg Varenicline without any negative side effects reported at this time. Counselor congratulated patient on his continual effort to remain tobacco free. Counselor reviewed strategies learned to aid patient with remaining tobacco free along with the health benefits of patient remaining a non-smoker. Follow-up visit scheduled in one week.  Counselor will remain available should any further needs arise.    Progress Toward Goals and Other Mental Status Changes: Patient is progressing without any mental status changes noted  Diagnosis: Z17.200  Plan: The patient will continue with group therapy sessions and medication regimen prescribed with management by physician or Cessation Clinic Provider. Patient will inform Smoking Clinic Cessation Counselor of symptoms as rated high on TCRS.  Return to Clinic: 1 week

## 2024-10-28 ENCOUNTER — OFFICE VISIT (OUTPATIENT)
Dept: FAMILY MEDICINE | Facility: CLINIC | Age: 74
End: 2024-10-28
Payer: MEDICARE

## 2024-10-28 ENCOUNTER — TELEPHONE (OUTPATIENT)
Dept: FAMILY MEDICINE | Facility: CLINIC | Age: 74
End: 2024-10-28

## 2024-10-28 VITALS
DIASTOLIC BLOOD PRESSURE: 82 MMHG | SYSTOLIC BLOOD PRESSURE: 138 MMHG | OXYGEN SATURATION: 96 % | HEART RATE: 63 BPM | WEIGHT: 186.75 LBS | BODY MASS INDEX: 26.15 KG/M2 | TEMPERATURE: 98 F | HEIGHT: 71 IN

## 2024-10-28 DIAGNOSIS — M10.071 ACUTE IDIOPATHIC GOUT OF RIGHT ANKLE: Primary | ICD-10-CM

## 2024-10-28 DIAGNOSIS — M25.571 ACUTE RIGHT ANKLE PAIN: Primary | ICD-10-CM

## 2024-10-28 PROCEDURE — 3079F DIAST BP 80-89 MM HG: CPT | Mod: CPTII,S$GLB,, | Performed by: INTERNAL MEDICINE

## 2024-10-28 PROCEDURE — 3008F BODY MASS INDEX DOCD: CPT | Mod: CPTII,S$GLB,, | Performed by: INTERNAL MEDICINE

## 2024-10-28 PROCEDURE — 1159F MED LIST DOCD IN RCRD: CPT | Mod: CPTII,S$GLB,, | Performed by: INTERNAL MEDICINE

## 2024-10-28 PROCEDURE — 99214 OFFICE O/P EST MOD 30 MIN: CPT | Mod: S$GLB,,, | Performed by: INTERNAL MEDICINE

## 2024-10-28 PROCEDURE — 1125F AMNT PAIN NOTED PAIN PRSNT: CPT | Mod: CPTII,S$GLB,, | Performed by: INTERNAL MEDICINE

## 2024-10-28 PROCEDURE — 1160F RVW MEDS BY RX/DR IN RCRD: CPT | Mod: CPTII,S$GLB,, | Performed by: INTERNAL MEDICINE

## 2024-10-28 PROCEDURE — 4010F ACE/ARB THERAPY RXD/TAKEN: CPT | Mod: CPTII,S$GLB,, | Performed by: INTERNAL MEDICINE

## 2024-10-28 PROCEDURE — 3288F FALL RISK ASSESSMENT DOCD: CPT | Mod: CPTII,S$GLB,, | Performed by: INTERNAL MEDICINE

## 2024-10-28 PROCEDURE — 3075F SYST BP GE 130 - 139MM HG: CPT | Mod: CPTII,S$GLB,, | Performed by: INTERNAL MEDICINE

## 2024-10-28 PROCEDURE — 1101F PT FALLS ASSESS-DOCD LE1/YR: CPT | Mod: CPTII,S$GLB,, | Performed by: INTERNAL MEDICINE

## 2024-10-28 PROCEDURE — 3044F HG A1C LEVEL LT 7.0%: CPT | Mod: CPTII,S$GLB,, | Performed by: INTERNAL MEDICINE

## 2024-10-28 PROCEDURE — 99999 PR PBB SHADOW E&M-EST. PATIENT-LVL IV: CPT | Mod: PBBFAC,,, | Performed by: INTERNAL MEDICINE

## 2024-10-28 RX ORDER — ATENOLOL 25 MG/1
TABLET ORAL
COMMUNITY

## 2024-10-28 RX ORDER — AMITRIPTYLINE HYDROCHLORIDE 10 MG/1
TABLET, FILM COATED ORAL
COMMUNITY

## 2024-10-28 RX ORDER — METHYLPREDNISOLONE 4 MG/1
TABLET ORAL
Qty: 21 EACH | Refills: 0 | Status: SHIPPED | OUTPATIENT
Start: 2024-10-29

## 2024-10-30 ENCOUNTER — CLINICAL SUPPORT (OUTPATIENT)
Dept: SMOKING CESSATION | Facility: CLINIC | Age: 74
End: 2024-10-30
Payer: COMMERCIAL

## 2024-10-30 DIAGNOSIS — F17.200 NICOTINE DEPENDENCE: Primary | ICD-10-CM

## 2024-10-30 PROCEDURE — 99999 PR PBB SHADOW E&M-EST. PATIENT-LVL I: CPT | Mod: PBBFAC,,,

## 2024-10-30 PROCEDURE — 90853 GROUP PSYCHOTHERAPY: CPT | Mod: S$GLB,,,

## 2024-11-05 ENCOUNTER — TELEPHONE (OUTPATIENT)
Dept: HEPATOLOGY | Facility: CLINIC | Age: 74
End: 2024-11-05
Payer: MEDICARE

## 2024-11-05 NOTE — TELEPHONE ENCOUNTER
"Returned pts call. Pt did not answer, left vm for pt to give the office a call back   ----- Message from Ramakrishna sent at 11/5/2024 12:11 PM CST -----  Regarding: miss call  Consult/Advisory:        Name Of Caller: Self        Contact Preference?:[phone]        What is the nature of the call?: Returning call to Ingrid        Additional Notes:  "Thank you for all that you do for our patients"  "

## 2024-11-05 NOTE — TELEPHONE ENCOUNTER
"Returned pts call. Pt did not answer, left vm for pt to give the office a call back   ----- Message from Ramakrishna sent at 11/5/2024  1:47 PM CST -----  Regarding: miss call  Consult/Advisory:        Name Of Caller: Self        Contact Preference?:[phone]        What is the nature of the call?: Returning call to  Ingrid        Additional Notes:  "Thank you for all that you do for our patients"  "

## 2024-11-05 NOTE — TELEPHONE ENCOUNTER
Reached out to pt to discuss test results. Pt did not answer, left vm for pt to give the office a call back.  Will send results letter to pt  ----- Message from Stvee Winn MD sent at 11/5/2024  8:14 AM CST -----  Mr. Richardson did not view portal message about results. Please let him know that pathology showed that the polyps removed during his colonoscopy were non-cancerous.  
Universal Safety Interventions

## 2024-11-11 ENCOUNTER — TELEPHONE (OUTPATIENT)
Dept: SMOKING CESSATION | Facility: CLINIC | Age: 74
End: 2024-11-11
Payer: MEDICARE

## 2024-11-13 ENCOUNTER — CLINICAL SUPPORT (OUTPATIENT)
Dept: SMOKING CESSATION | Facility: CLINIC | Age: 74
End: 2024-11-13
Payer: COMMERCIAL

## 2024-11-13 DIAGNOSIS — F17.200 NICOTINE DEPENDENCE: Primary | ICD-10-CM

## 2024-11-13 PROCEDURE — 90853 GROUP PSYCHOTHERAPY: CPT | Mod: S$GLB,,,

## 2024-11-13 PROCEDURE — 99999 PR PBB SHADOW E&M-EST. PATIENT-LVL I: CPT | Mod: PBBFAC,,,

## 2024-11-13 NOTE — PROGRESS NOTES
[place] [facility] [place]  11/13/2024  Clinical Status of Patient: Outpatient   Length of Service and Code: 90 minutes - 07971   Number in Attendance: 3  CO Monitor Score: 2 pmm  Group Activities/Focus of Group:  orientation, client introductions, completion of TCRS (Tobacco Cessation Rating Scale) learned addiction model, cues/triggers, personal reasons for quitting, medications, goals, quit date    Target symptoms:  withdrawal and medication side effects             The following were rated moderate (3) to severe (4) on TCRS:       Moderate 3: Crave and Desire      Severe 4:  None      Patient's Response to Intervention: Patient presented to clinic for group visit, name and date of birth verified as two patient identifiers. Patient reported he remains tobacco free, and he remains using 1 mg Varenicline without any negative side effects reported at this time. Counselor congratulated patient on his continual effort to remain tobacco free. Counselor reviewed strategies learned to aid patient with remaining tobacco free. Follow-up visit scheduled in one week.  Counselor will remain available should any further needs arise.  Progress Toward Goals and Other Mental Status Changes: Patient is progressing without any mental status changes noted.    Diagnosis: Z17.200  Plan: The patient will continue with group therapy sessions and medication regimen prescribed with management by physician or Cessation Clinic Provider. Patient will inform Smoking Clinic Cessation Counselor of symptoms as rated high on TCRS.  Return to Clinic: 1 week

## 2024-12-18 ENCOUNTER — CLINICAL SUPPORT (OUTPATIENT)
Dept: SMOKING CESSATION | Facility: CLINIC | Age: 74
End: 2024-12-18
Payer: COMMERCIAL

## 2024-12-18 DIAGNOSIS — F17.200 NICOTINE DEPENDENCE: Primary | ICD-10-CM

## 2024-12-18 PROCEDURE — 99999 PR PBB SHADOW E&M-EST. PATIENT-LVL II: CPT | Mod: PBBFAC,,,

## 2024-12-18 PROCEDURE — 90853 GROUP PSYCHOTHERAPY: CPT | Mod: S$GLB,,,

## 2024-12-18 NOTE — PROGRESS NOTES
[place] [facility] [place]:  12/18/2024  Clinical Status of Patient: Outpatient   Length of Service and Code: 90 minutes - 16844   Number in Attendance: 3  Group Activities/Focus of Group:  orientation, client introductions, completion of TCRS (Tobacco Cessation Rating Scale) learned addiction model, cues/triggers, personal reasons for quitting, medications, goals, quit date    Target symptoms:  withdrawal and medication side effects             The following were rated moderate (3) to severe (4) on TCRS:       Moderate 3: Crave and Desire      Severe 4:   None   Patient's Response to Intervention: Patient presented to clinic for group visit, name and date of birth verified as two patient identifiers. Patient reported he remains tobacco free, and he remains using 1 mg Varenicline without any negative side effects reported at this time. Counselor congratulated patient on his continual effort to remain tobacco free. Counselor reviewed strategies learned to aid patient with remaining tobacco free, and she also discussed patient weaning himself from 1 mg Varenicline.  Follow-up visit scheduled in two weeks.  Counselor will remain available should any further needs arise.   Progress Toward Goals and Other Mental Status Changes: Patient is progressing without any mental status changes noted.   Diagnosis: Z17.200  Plan: The patient will continue with group therapy sessions and medication regimen prescribed with management by physician or Cessation Clinic Provider. Patient will inform Smoking Clinic Cessation Counselor of symptoms as rated high on TCRS.  Return to Clinic: 2 weeks

## 2025-01-30 DIAGNOSIS — Z00.00 ENCOUNTER FOR MEDICARE ANNUAL WELLNESS EXAM: ICD-10-CM

## 2025-02-04 ENCOUNTER — TELEPHONE (OUTPATIENT)
Dept: SMOKING CESSATION | Facility: CLINIC | Age: 75
End: 2025-02-04
Payer: MEDICAID

## 2025-02-13 ENCOUNTER — CLINICAL SUPPORT (OUTPATIENT)
Dept: SMOKING CESSATION | Facility: CLINIC | Age: 75
End: 2025-02-13
Payer: COMMERCIAL

## 2025-02-13 DIAGNOSIS — F17.200 NICOTINE DEPENDENCE: Primary | ICD-10-CM

## 2025-02-13 NOTE — PROGRESS NOTES
Spoke with patient today in regard to smoking cessation progress for 6 month phone follow up on Quit 3.  Patient is tobacco free at this time but stated that he continues to have strong urges to smoke.    Commended patient on their success and progress thus far.  Patient currently enrolled in program for Quit 3 and states he will attend a Group session next week.  Patient is pleased with the Group sessions and counseling he has received from [facility], Brittany Emmanuel.  Informed patient of benefit period, future follow ups, and contact information if any further help or support is needed.  Will complete smart form for 3/6 month follow up on Quit attempt # 3.

## 2025-02-17 ENCOUNTER — TELEPHONE (OUTPATIENT)
Dept: PHARMACY | Facility: CLINIC | Age: 75
End: 2025-02-17
Payer: MEDICAID

## 2025-02-17 NOTE — TELEPHONE ENCOUNTER
[facility]/Population Health Chart Review & Patient Outreach Details For Medication Adherence Project    Reason for Outreach Encounter: 3rd Party payor non-compliance report ([employer], [employer], C, etc)  2.  Patient Outreach Method: Reviewed patient chart  and [facility] message  3.   Medication in question:    Hypertension Medications              amLODIPine (NORVASC) 10 MG tablet Take 1 tablet (10 mg total) by mouth once daily.    atenoloL (TENORMIN) 25 MG tablet 1 tablet Orally Once a day    hydrALAZINE (APRESOLINE) 25 MG tablet Take 1 tablet (25 mg total) by mouth 3 (three) times daily.    losartan (COZAAR) 25 MG tablet Take 1 tablet (25 mg total) by mouth once daily.              Hyperlipidemia Medications              atorvastatin (LIPITOR) 40 MG tablet Take 1 tablet (40 mg total) by mouth once daily.    gemfibroziL (LOPID) 600 MG tablet Take 1 tablet (600 mg total) by mouth 2 (two) times daily before meals.    omega-3 acid ethyl esters (LOVAZA) 1 gram capsule Take 2 capsules (2 g total) by mouth 2 (two) times daily.                  Losartan LF 90 ds 1/5/25  Atorvastatin LF 90 ds 6/15/24      4.  Reviewed and or Updates Made To: Patient Chart  5. Outreach Outcomes and/or actions taken: Patient filled medication and is on track to be adherent and Sent inquiry to patient: Waiting for response  Additional Notes:

## 2025-03-01 ENCOUNTER — OFFICE VISIT (OUTPATIENT)
Dept: URGENT CARE | Facility: CLINIC | Age: 75
End: 2025-03-01
Payer: MEDICAID

## 2025-03-01 VITALS
SYSTOLIC BLOOD PRESSURE: 171 MMHG | OXYGEN SATURATION: 95 % | HEART RATE: 67 BPM | BODY MASS INDEX: 26.04 KG/M2 | DIASTOLIC BLOOD PRESSURE: 81 MMHG | RESPIRATION RATE: 16 BRPM | WEIGHT: 186 LBS | HEIGHT: 71 IN | TEMPERATURE: 98 F

## 2025-03-01 DIAGNOSIS — J02.9 VIRAL PHARYNGITIS: Primary | ICD-10-CM

## 2025-03-01 DIAGNOSIS — R05.9 COUGH, UNSPECIFIED TYPE: ICD-10-CM

## 2025-03-01 LAB
CTP QC/QA: YES
CTP QC/QA: YES
POC MOLECULAR INFLUENZA A AGN: NEGATIVE
POC MOLECULAR INFLUENZA B AGN: NEGATIVE
SARS CORONAVIRUS 2 ANTIGEN: NEGATIVE

## 2025-03-01 PROCEDURE — 99213 OFFICE O/P EST LOW 20 MIN: CPT | Mod: S$GLB,,, | Performed by: NURSE PRACTITIONER

## 2025-03-01 PROCEDURE — 87502 INFLUENZA DNA AMP PROBE: CPT | Mod: QW,S$GLB,, | Performed by: NURSE PRACTITIONER

## 2025-03-01 PROCEDURE — 87811 SARS-COV-2 COVID19 W/OPTIC: CPT | Mod: QW,S$GLB,, | Performed by: NURSE PRACTITIONER

## 2025-03-01 RX ORDER — GUAIFENESIN 600 MG/1
1200 TABLET, EXTENDED RELEASE ORAL 2 TIMES DAILY
Qty: 40 TABLET | Refills: 0 | Status: SHIPPED | OUTPATIENT
Start: 2025-03-01 | End: 2025-03-11

## 2025-03-01 RX ORDER — IPRATROPIUM BROMIDE 21 UG/1
2 SPRAY, METERED NASAL 2 TIMES DAILY
Qty: 30 ML | Refills: 0 | Status: SHIPPED | OUTPATIENT
Start: 2025-03-01 | End: 2025-03-08

## 2025-03-01 RX ORDER — BENZONATATE 100 MG/1
200 CAPSULE ORAL 3 TIMES DAILY PRN
Qty: 60 CAPSULE | Refills: 0 | Status: SHIPPED | OUTPATIENT
Start: 2025-03-01 | End: 2025-03-11

## 2025-03-01 RX ORDER — CETIRIZINE HYDROCHLORIDE 10 MG/1
10 TABLET ORAL DAILY
Qty: 30 TABLET | Refills: 0 | Status: SHIPPED | OUTPATIENT
Start: 2025-03-01 | End: 2025-03-31

## 2025-03-01 NOTE — PATIENT INSTRUCTIONS
Discharge instructions for Viral Pharyngitis and Cough    - Rest.    - Drink plenty of fluids.  - Viral upper respiratory infections typically run their course in 10-14 days.     - Tylenol (acetaminophen) or Ibuprofen as directed as needed for fever/pain. Avoid tylenol if you have a history of liver disease. Do not take ibuprofen if you have a history of GI bleeding, kidney disease, gastric surgery, or if you take blood thinners.     - You can take over-the-counter claritin, zyrtec, allegra, or xyzal as directed. These are antihistamines that can help with runny nose, nasal congestion, sneezing, and helps to dry up post-nasal drip, which usually causes sore throat and cough.   - If you do NOT have high blood pressure, you may use a decongestant form (D)  of this medication (ie. Claritin- D, zyrtec-D, allegra-D) or if you do not take the D form, you can take sudafed (pseudoephedrine) over the counter, which is a decongestant. Do NOT take two decongestant (D) medications at the same time (such as mucinex-D and claritin-D or plain sudafed and claritin D)    - You can use Flonase (fluticasone) nasal spray as directed for sinus congestion and postnasal drip. This is a steroid nasal spray that works locally over time to decrease the inflammation in your nose/sinuses and help with allergic symptoms. This is not an quick- relief spray like afrin, but it works well if used daily.  Discontinue if you develop nose bleed  - use nasal saline prior to Flonase.  - Use Ocean Spray Nasal Saline 1-3 puffs each nostril every 2-3 hours then blow out onto tissue. This is to irrigate the nasal passage way to clear the sinus openings. Use until sinus problem resolved.    - you can take plain Mucinex (guaifenesin) 1200 mg twice a day to help loosen mucous.     -warm salt water gargles can help with sore throat    - warm tea with honey can help with cough. Honey is a natural cough suppressant.    - Dextromethorphan (DM) is a cough  suppressant over the counter (ie. mucinex DM, robitussin, delsym; dayquil/nyquil has DM as well.)    - Follow up with your PCP or specialty clinic as directed in the next 1-2 weeks if not improved or as needed.  You can call [phone] to schedule an appointment with the appropriate provider.      - Go to the ER if you develop new or worsening symptoms.     - You must understand that you have received an Urgent Care treatment only and that you may be released before all of your medical problems are known or treated.   - You, the patient, will arrange for follow up care as instructed.   - If your condition worsens or fails to improve we recommend that you receive another evaluation at the ER immediately or contact your PCP to discuss your concerns or return here.

## 2025-03-01 NOTE — PROGRESS NOTES
"Subjective:      Patient ID: Antolin Richardson is a 74 y.o. male.    Vitals:  height is 5' 11" (1.803 m) and weight is 84.4 kg (186 lb). His oral temperature is 98.3 °F (36.8 °C). His blood pressure is 171/81 (abnormal) and his pulse is 67. His respiration is 16 and oxygen saturation is 95%.     Chief Complaint: Cough    Pt is coming in with sinus issues that started a day ago. Pt says he also has headaches, sinus pressure, congestion and cough. Pt says he wasn't exposed to anything. Pt didn't take any medication to help. Pt repots he stopped smoking 3 months ago.    Cough  This is a new problem. The current episode started yesterday. The problem has been unchanged. The problem occurs constantly. Associated symptoms include headaches, nasal congestion, postnasal drip and a sore throat. Pertinent negatives include no chest pain, chills, ear pain, fever or rash. Nothing aggravates the symptoms. He has tried nothing for the symptoms. The treatment provided no relief. There is no history of asthma, bronchitis or pneumonia.       Constitution: Negative for chills, sweating, fatigue and fever.   HENT:  Positive for postnasal drip and sore throat. Negative for ear pain and ear discharge.    Cardiovascular:  Negative for chest pain, leg swelling, palpitations and sob on exertion.   Respiratory:  Positive for cough. Negative for COPD and asthma.    Skin:  Negative for color change and rash.   Allergic/Immunologic: Negative for asthma.   Neurological:  Positive for headaches.      Objective:     Physical Exam   Constitutional: He is oriented to person, place, and time. He appears well-developed. He is cooperative.  Non-toxic appearance. He does not appear ill. No distress. awake  HENT:   Head: Normocephalic and atraumatic.   Ears:   Right Ear: Hearing, tympanic membrane, external ear and ear canal normal.   Left Ear: Hearing, tympanic membrane, external ear and ear canal normal.   Nose: Mucosal edema and congestion present. " No rhinorrhea or nasal deformity. No epistaxis. Right sinus exhibits no maxillary sinus tenderness and no frontal sinus tenderness. Left sinus exhibits no maxillary sinus tenderness and no frontal sinus tenderness.   Mouth/Throat: Uvula is midline and mucous membranes are normal. No trismus in the jaw. Normal dentition. No uvula swelling. Posterior oropharyngeal erythema present. No oropharyngeal exudate or posterior oropharyngeal edema.   Eyes: Conjunctivae and lids are normal. Pupils are equal, round, and reactive to light. No scleral icterus. Extraocular movement intact   Neck: Trachea normal and phonation normal. Neck supple. No thyromegaly present. No edema present. No erythema present. No neck rigidity present.   Cardiovascular: Normal rate, regular rhythm, S1 normal, S2 normal, normal heart sounds and normal pulses.   Pulmonary/Chest: Effort normal and breath sounds normal. No respiratory distress. He has no decreased breath sounds. He has no wheezes. He has no rhonchi. He has no rales.   Abdominal: Normal appearance and bowel sounds are normal. Soft. flat abdomen   Musculoskeletal: Normal range of motion.         General: No deformity. Normal range of motion.      Right lower leg: No edema.      Left lower leg: No edema.   Lymphadenopathy:     He has no cervical adenopathy.   Neurological: no focal deficit. He is alert and oriented to person, place, and time. He exhibits normal muscle tone. Coordination normal.   Skin: Skin is warm, dry, intact, not diaphoretic and not pale. Capillary refill takes less than 2 seconds.   Psychiatric: His speech is normal and behavior is normal. Mood, judgment and thought content normal.   Nursing note and vitals reviewed.      Assessment:     1. Viral pharyngitis    2. Cough, unspecified type      Patient he was negative for COVID and negative for flu.  Discussed with patient management of viral pharyngitis at home  Plan:       Viral pharyngitis  -     benzonatate (TESSALON)  100 MG capsule; Take 2 capsules (200 mg total) by mouth 3 (three) times daily as needed for Cough.  Dispense: 60 capsule; Refill: 0  -     ipratropium (ATROVENT) 21 mcg (0.03 %) nasal spray; 2 sprays by Each Nostril route 2 (two) times daily. for 7 days  Dispense: 30 mL; Refill: 0  -     guaiFENesin (MUCINEX) 600 mg 12 hr tablet; Take 2 tablets (1,200 mg total) by mouth 2 (two) times daily. for 10 days  Dispense: 40 tablet; Refill: 0  -     cetirizine (ZYRTEC) 10 MG tablet; Take 1 tablet (10 mg total) by mouth once daily.  Dispense: 30 tablet; Refill: 0    Cough, unspecified type  -     SARS Coronavirus 2 Antigen, POCT Manual Read  -     POCT Influenza A/B MOLECULAR  -     benzonatate (TESSALON) 100 MG capsule; Take 2 capsules (200 mg total) by mouth 3 (three) times daily as needed for Cough.  Dispense: 60 capsule; Refill: 0      Patient Instructions   Discharge instructions for Viral Pharyngitis and Cough    - Rest.    - Drink plenty of fluids.  - Viral upper respiratory infections typically run their course in 10-14 days.     - Tylenol (acetaminophen) or Ibuprofen as directed as needed for fever/pain. Avoid tylenol if you have a history of liver disease. Do not take ibuprofen if you have a history of GI bleeding, kidney disease, gastric surgery, or if you take blood thinners.     - You can take over-the-counter claritin, zyrtec, allegra, or xyzal as directed. These are antihistamines that can help with runny nose, nasal congestion, sneezing, and helps to dry up post-nasal drip, which usually causes sore throat and cough.   - If you do NOT have high blood pressure, you may use a decongestant form (D)  of this medication (ie. Claritin- D, zyrtec-D, allegra-D) or if you do not take the D form, you can take sudafed (pseudoephedrine) over the counter, which is a decongestant. Do NOT take two decongestant (D) medications at the same time (such as mucinex-D and claritin-D or plain sudafed and claritin D)    - You can use  Flonase (fluticasone) nasal spray as directed for sinus congestion and postnasal drip. This is a steroid nasal spray that works locally over time to decrease the inflammation in your nose/sinuses and help with allergic symptoms. This is not an quick- relief spray like afrin, but it works well if used daily.  Discontinue if you develop nose bleed  - use nasal saline prior to Flonase.  - Use Ocean Spray Nasal Saline 1-3 puffs each nostril every 2-3 hours then blow out onto tissue. This is to irrigate the nasal passage way to clear the sinus openings. Use until sinus problem resolved.    - you can take plain Mucinex (guaifenesin) 1200 mg twice a day to help loosen mucous.     -warm salt water gargles can help with sore throat    - warm tea with honey can help with cough. Honey is a natural cough suppressant.    - Dextromethorphan (DM) is a cough suppressant over the counter (ie. mucinex DM, robitussin, delsym; dayquil/nyquil has DM as well.)    - Follow up with your PCP or specialty clinic as directed in the next 1-2 weeks if not improved or as needed.  You can call [phone] to schedule an appointment with the appropriate provider.      - Go to the ER if you develop new or worsening symptoms.     - You must understand that you have received an Urgent Care treatment only and that you may be released before all of your medical problems are known or treated.   - You, the patient, will arrange for follow up care as instructed.   - If your condition worsens or fails to improve we recommend that you receive another evaluation at the ER immediately or contact your PCP to discuss your concerns or return here.            Additional MDM:     Heart Failure Score:   COPD = No

## 2025-04-01 ENCOUNTER — TELEPHONE (OUTPATIENT)
Dept: OPTOMETRY | Facility: CLINIC | Age: 75
End: 2025-04-01
Payer: MEDICAID

## 2025-05-01 ENCOUNTER — CLINICAL SUPPORT (OUTPATIENT)
Dept: SMOKING CESSATION | Facility: CLINIC | Age: 75
End: 2025-05-01
Payer: COMMERCIAL

## 2025-05-01 DIAGNOSIS — F17.200 NICOTINE DEPENDENCE: Primary | ICD-10-CM

## 2025-05-01 PROCEDURE — 99404 PREV MED CNSL INDIV APPRX 60: CPT | Mod: S$GLB,,,

## 2025-05-01 PROCEDURE — 99999 PR PBB SHADOW E&M-EST. PATIENT-LVL I: CPT | Mod: PBBFAC,,,

## 2025-05-01 RX ORDER — VARENICLINE TARTRATE 0.5 (11)-1
KIT ORAL
Qty: 53 EACH | Refills: 0 | Status: SHIPPED | OUTPATIENT
Start: 2025-05-01

## 2025-05-01 RX ORDER — DM/P-EPHED/ACETAMINOPH/DOXYLAM 30-7.5/3
2 LIQUID (ML) ORAL
Qty: 144 LOZENGE | Refills: 0 | Status: SHIPPED | OUTPATIENT
Start: 2025-05-01 | End: 2025-05-25

## 2025-05-01 NOTE — PROGRESS NOTES
Patient presented to clinic for intake visit, name and date of birth verified as two patient identifiers. Patient's FTND score of 5 is indicative of a high level of nicotine dependence, and his CESD score of 27 is perceived as mental distress noted. Patient denies SI/HI.  Patient reported he currently smokes 1 pack of cpd. Counselor discussed nicotine replacement options and packet 1 with patient. Counselor outlined cues and triggers, differentiating between urge and habit, behavior modification, initiating a smoking journal, waiting 15 minutes prior to smoking, and engaging in positive outlets such as exercising in lieu of smoking. Patient will begin biweekly smoking cessation counseling sessions, and he will also begin 1 mg Varenicline in conjunction with 150 mg Wellbutrin SR.  Counselor allotted time for questions, ,discussed proper medication usage along with possible medication side effects, and she provided patient with her contact information. Follow-up visit scheduled in two weeks. Counselor will remain available should any further needs arise.

## 2025-05-15 ENCOUNTER — TELEPHONE (OUTPATIENT)
Dept: SMOKING CESSATION | Facility: CLINIC | Age: 75
End: 2025-05-15
Payer: MEDICAID

## 2025-05-15 NOTE — TELEPHONE ENCOUNTER
Counselor attempted to contact patient for scheduled telephonic follow-up visit.  Counselor was unable to leave a message, patient's voicemail was full.      Brittany Emmanuel RRT, MSW, LMSW, CPAHA  Certified Professional [employer]- Tobacco Treatment  [phone]

## 2025-05-20 ENCOUNTER — CLINICAL SUPPORT (OUTPATIENT)
Dept: SMOKING CESSATION | Facility: CLINIC | Age: 75
End: 2025-05-20
Payer: COMMERCIAL

## 2025-05-20 DIAGNOSIS — F17.200 NICOTINE DEPENDENCE: Primary | ICD-10-CM

## 2025-05-20 PROCEDURE — 99401 PREV MED CNSL INDIV APPRX 15: CPT | Mod: S$GLB,,,

## 2025-06-05 ENCOUNTER — TELEPHONE (OUTPATIENT)
Dept: SMOKING CESSATION | Facility: CLINIC | Age: 75
End: 2025-06-05
Payer: MEDICAID

## 2025-06-17 ENCOUNTER — TELEPHONE (OUTPATIENT)
Dept: PHARMACY | Facility: CLINIC | Age: 75
End: 2025-06-17
Payer: MEDICAID

## 2025-06-17 NOTE — TELEPHONE ENCOUNTER
[facility]/Population Health Chart Review & Patient Outreach Details For Medication Adherence Project    Reason for Outreach Encounter: 3rd Party payor non-compliance report ([employer], [employer], C, etc)  2.  Patient Outreach Method: Reviewed Patient Chart  3.   Medication in question: losartan   LAST FILLED: 4/16/25 for 90 day supply  Hypertension Medications              amLODIPine (NORVASC) 10 MG tablet Take 1 tablet (10 mg total) by mouth once daily.    atenoloL (TENORMIN) 25 MG tablet 1 tablet Orally Once a day    hydrALAZINE (APRESOLINE) 25 MG tablet Take 1 tablet (25 mg total) by mouth 3 (three) times daily.    losartan (COZAAR) 25 MG tablet Take 1 tablet (25 mg total) by mouth once daily.              4.  Reviewed and or Updates Made To: Patient Chart  5. Outreach Outcomes and/or actions taken: Patient filled medication and is on track to be adherent

## 2025-06-19 ENCOUNTER — TELEPHONE (OUTPATIENT)
Dept: SMOKING CESSATION | Facility: CLINIC | Age: 75
End: 2025-06-19
Payer: MEDICAID

## 2025-06-19 NOTE — TELEPHONE ENCOUNTER
Smoking Cessation counselor attempted to contact patient in attempt to reschedule no-show follow-up appointment. Counselor was unable to leave a message with rescheduling information due to an unestablished voicemail.      Brittany Emmanuel RRT,MSW,LMSW, CPAHA   Certified Professional [employer]- Tobacco Treatment  [phone]

## 2025-07-16 DIAGNOSIS — I10 HYPERTENSION, ESSENTIAL: ICD-10-CM

## 2025-08-07 ENCOUNTER — CLINICAL SUPPORT (OUTPATIENT)
Dept: SMOKING CESSATION | Facility: CLINIC | Age: 75
End: 2025-08-07
Payer: COMMERCIAL

## 2025-08-07 DIAGNOSIS — F17.200 NICOTINE DEPENDENCE: Primary | ICD-10-CM

## 2025-08-07 PROCEDURE — 99407 BEHAV CHNG SMOKING > 10 MIN: CPT | Mod: S$GLB,,,

## 2025-08-07 NOTE — PROGRESS NOTES
Spoke with patient today in regard to smoking cessation progress for 3 month telephone follow up, he states not tobacco free. Patient states not longer in the program and he has moved to [place]. Informed patient of benefit period, future follow ups, and contact information if any further help or support is needed. Will complete smart form for 3 month follow up on Quit attempt #4.

## (undated) DEVICE — UNDERGLOVES BIOGEL PI SZ 6 LF

## (undated) DEVICE — SOL NACL IRR 3000ML

## (undated) DEVICE — SUT 4-0 CHROMIC GUT / RB1

## (undated) DEVICE — UNDERGLOVE BIOGEL PI SZ 6.5 LF

## (undated) DEVICE — SHEET DRAPE MEDIUM

## (undated) DEVICE — SLEEVE SCD EXPRESS CALF MEDIUM

## (undated) DEVICE — SEE MEDLINE ITEM 152487

## (undated) DEVICE — SEE MEDLINE ITEM 152651

## (undated) DEVICE — APRON DISPOSP PLASTIC 24INX42

## (undated) DEVICE — JELLY LUBRICANT STERILE 4 OZ

## (undated) DEVICE — CONTAINER SPECIMEN STRL 4OZ

## (undated) DEVICE — SEE MEDLINE ITEM 146313

## (undated) DEVICE — EVACUATOR BLADDER ELLIK

## (undated) DEVICE — WATER STERILE INJ 500ML BAG

## (undated) DEVICE — LOOP CUTTING RESECT 12 D 24F

## (undated) DEVICE — SOLN BETADINE SWAB AIDS

## (undated) DEVICE — PLUG CATHETER STERILE FOLEY

## (undated) DEVICE — SCRUB HIBICLENS 4% CHG 4OZ

## (undated) DEVICE — SET CYSTO IRRIGATION UNIV SPIK

## (undated) DEVICE — GAUZE SPONGE BULKEE 6X6.75IN

## (undated) DEVICE — SEE L#95700

## (undated) DEVICE — SEE MEDLINE ITEM 157116

## (undated) DEVICE — SOL IRR NACL .9% 3000ML

## (undated) DEVICE — ELECTRODE RESECTION BUTTON LRG

## (undated) DEVICE — CATH URETH FOL 3W 22FR 30ML

## (undated) DEVICE — GLOVE SURGEONS ULTRA TOUCH 6.5

## (undated) DEVICE — SYR 50ML CATH TIP

## (undated) DEVICE — SOL WATER STRL IRR 1000ML

## (undated) DEVICE — SEE MEDLINE ITEM 157185

## (undated) DEVICE — DRAPE UNDER BUTTOCKS WITH POUCH